# Patient Record
Sex: MALE | Race: WHITE | NOT HISPANIC OR LATINO | Employment: OTHER | URBAN - METROPOLITAN AREA
[De-identification: names, ages, dates, MRNs, and addresses within clinical notes are randomized per-mention and may not be internally consistent; named-entity substitution may affect disease eponyms.]

---

## 2021-05-25 ENCOUNTER — RECORDS - HEALTHEAST (OUTPATIENT)
Dept: ADMINISTRATIVE | Facility: CLINIC | Age: 71
End: 2021-05-25

## 2021-05-27 ENCOUNTER — RECORDS - HEALTHEAST (OUTPATIENT)
Dept: ADMINISTRATIVE | Facility: CLINIC | Age: 71
End: 2021-05-27

## 2021-05-30 ENCOUNTER — RECORDS - HEALTHEAST (OUTPATIENT)
Dept: ADMINISTRATIVE | Facility: CLINIC | Age: 71
End: 2021-05-30

## 2021-06-01 ENCOUNTER — RECORDS - HEALTHEAST (OUTPATIENT)
Dept: ADMINISTRATIVE | Facility: CLINIC | Age: 71
End: 2021-06-01

## 2021-06-02 ENCOUNTER — RECORDS - HEALTHEAST (OUTPATIENT)
Dept: ADMINISTRATIVE | Facility: CLINIC | Age: 71
End: 2021-06-02

## 2021-06-16 PROBLEM — K40.90 LEFT INGUINAL HERNIA: Status: ACTIVE | Noted: 2019-02-04

## 2021-06-16 PROBLEM — G47.33 OSA (OBSTRUCTIVE SLEEP APNEA): Status: ACTIVE | Noted: 2019-03-20

## 2021-06-16 PROBLEM — H25.13 NUCLEAR SCLEROTIC CATARACT OF BOTH EYES: Status: ACTIVE | Noted: 2018-06-13

## 2022-07-16 ENCOUNTER — APPOINTMENT (OUTPATIENT)
Dept: CT IMAGING | Facility: HOSPITAL | Age: 72
End: 2022-07-16
Attending: STUDENT IN AN ORGANIZED HEALTH CARE EDUCATION/TRAINING PROGRAM
Payer: COMMERCIAL

## 2022-07-16 ENCOUNTER — HOSPITAL ENCOUNTER (EMERGENCY)
Facility: HOSPITAL | Age: 72
Discharge: HOME | End: 2022-07-16
Attending: EMERGENCY MEDICINE | Admitting: EMERGENCY MEDICINE
Payer: COMMERCIAL

## 2022-07-16 ENCOUNTER — APPOINTMENT (OUTPATIENT)
Dept: MRI IMAGING | Facility: HOSPITAL | Age: 72
End: 2022-07-16
Attending: EMERGENCY MEDICINE
Payer: COMMERCIAL

## 2022-07-16 VITALS
OXYGEN SATURATION: 96 % | RESPIRATION RATE: 17 BRPM | HEART RATE: 61 BPM | HEIGHT: 67 IN | TEMPERATURE: 98.5 F | DIASTOLIC BLOOD PRESSURE: 87 MMHG | WEIGHT: 200 LBS | SYSTOLIC BLOOD PRESSURE: 164 MMHG | BODY MASS INDEX: 31.39 KG/M2

## 2022-07-16 DIAGNOSIS — R42 DIZZINESS: ICD-10-CM

## 2022-07-16 DIAGNOSIS — R42 DISEQUILIBRIUM: ICD-10-CM

## 2022-07-16 PROBLEM — H35.3132 NONEXUDATIVE AGE-RELATED MACULAR DEGENERATION, BILATERAL, INTERMEDIATE DRY STAGE: Status: ACTIVE | Noted: 2022-07-16

## 2022-07-16 PROBLEM — G89.29 CHRONIC NECK PAIN: Status: ACTIVE | Noted: 2021-06-30

## 2022-07-16 PROBLEM — G47.30 SLEEP APNEA: Status: ACTIVE | Noted: 2022-07-16

## 2022-07-16 PROBLEM — M54.16 LUMBAR RADICULAR PAIN: Status: ACTIVE | Noted: 2021-06-30

## 2022-07-16 PROBLEM — I89.0 LYMPHEDEMA: Status: ACTIVE | Noted: 2021-03-30

## 2022-07-16 PROBLEM — R69 OTHER ILL-DEFINED AND UNKNOWN CAUSES OF MORBIDITY AND MORTALITY: Status: ACTIVE | Noted: 2022-07-16

## 2022-07-16 PROBLEM — Z87.438 HISTORY OF ERECTILE DYSFUNCTION: Status: ACTIVE | Noted: 2022-07-16

## 2022-07-16 PROBLEM — H35.3190 NONEXUDATIVE AGE-RELATED MACULAR DEGENERATION: Status: ACTIVE | Noted: 2022-07-16

## 2022-07-16 PROBLEM — I34.0 MILD MITRAL VALVE REGURGITATION: Status: ACTIVE | Noted: 2021-03-18

## 2022-07-16 PROBLEM — F41.1 ANXIETY STATE: Status: ACTIVE | Noted: 2022-07-16

## 2022-07-16 PROBLEM — I07.1 MILD TRICUSPID VALVE REGURGITATION: Status: ACTIVE | Noted: 2021-03-18

## 2022-07-16 PROBLEM — H35.3113 NONEXUDATIVE AGE-RELATED MACULAR DEGENERATION, RIGHT EYE, ADVANCED ATROPHIC WITHOUT SUBFOVEAL INVOLVEMENT: Status: ACTIVE | Noted: 2022-07-16

## 2022-07-16 PROBLEM — I10 ESSENTIAL (PRIMARY) HYPERTENSION: Status: ACTIVE | Noted: 2022-07-16

## 2022-07-16 PROBLEM — M54.2 CHRONIC NECK PAIN: Status: ACTIVE | Noted: 2021-06-30

## 2022-07-16 PROBLEM — R93.3 ABNORMAL COLONOSCOPY: Status: ACTIVE | Noted: 2022-07-16

## 2022-07-16 PROBLEM — I77.810 MILD ASCENDING AORTA DILATION (H): Status: ACTIVE | Noted: 2021-03-18

## 2022-07-16 PROBLEM — I35.1 MILD AORTIC VALVE REGURGITATION: Status: ACTIVE | Noted: 2021-03-18

## 2022-07-16 PROBLEM — G47.00 INSOMNIA: Status: ACTIVE | Noted: 2022-07-16

## 2022-07-16 PROBLEM — H35.30 MACULAR DEGENERATION: Status: ACTIVE | Noted: 2022-07-16

## 2022-07-16 PROBLEM — F32.9 CURRENT EPISODE OF MAJOR DEPRESSIVE DISORDER WITHOUT PRIOR EPISODE, UNSPECIFIED DEPRESSION EPISODE SEVERITY: Status: ACTIVE | Noted: 2022-07-16

## 2022-07-16 LAB
ANION GAP SERPL CALCULATED.3IONS-SCNC: 7 MMOL/L (ref 5–18)
APTT PPP: 28 SECONDS (ref 22–38)
ATRIAL RATE - MUSE: 59 BPM
BUN SERPL-MCNC: 19 MG/DL (ref 8–28)
CALCIUM SERPL-MCNC: 8.8 MG/DL (ref 8.5–10.5)
CHLORIDE BLD-SCNC: 105 MMOL/L (ref 98–107)
CO2 SERPL-SCNC: 25 MMOL/L (ref 22–31)
CREAT SERPL-MCNC: 0.88 MG/DL (ref 0.7–1.3)
DIASTOLIC BLOOD PRESSURE - MUSE: 77 MMHG
ERYTHROCYTE [DISTWIDTH] IN BLOOD BY AUTOMATED COUNT: 13.1 % (ref 10–15)
GFR SERPL CREATININE-BSD FRML MDRD: >90 ML/MIN/1.73M2
GLUCOSE BLD-MCNC: 104 MG/DL (ref 70–125)
GLUCOSE BLDC GLUCOMTR-MCNC: 106 MG/DL (ref 70–99)
HCT VFR BLD AUTO: 40.5 % (ref 40–53)
HGB BLD-MCNC: 14.2 G/DL (ref 13.3–17.7)
HOLD SPECIMEN: NORMAL
INR PPP: 1.14 (ref 0.85–1.15)
INTERPRETATION ECG - MUSE: NORMAL
MAGNESIUM SERPL-MCNC: 2.1 MG/DL (ref 1.8–2.6)
MCH RBC QN AUTO: 32.1 PG (ref 26.5–33)
MCHC RBC AUTO-ENTMCNC: 35.1 G/DL (ref 31.5–36.5)
MCV RBC AUTO: 92 FL (ref 78–100)
P AXIS - MUSE: 53 DEGREES
PLATELET # BLD AUTO: 173 10E3/UL (ref 150–450)
POTASSIUM BLD-SCNC: 3.7 MMOL/L (ref 3.5–5)
PR INTERVAL - MUSE: 238 MS
QRS DURATION - MUSE: 90 MS
QT - MUSE: 408 MS
QTC - MUSE: 403 MS
R AXIS - MUSE: 14 DEGREES
RBC # BLD AUTO: 4.42 10E6/UL (ref 4.4–5.9)
SODIUM SERPL-SCNC: 137 MMOL/L (ref 136–145)
SYSTOLIC BLOOD PRESSURE - MUSE: 158 MMHG
T AXIS - MUSE: 18 DEGREES
TROPONIN I SERPL-MCNC: <0.01 NG/ML (ref 0–0.29)
VENTRICULAR RATE- MUSE: 59 BPM
WBC # BLD AUTO: 6.7 10E3/UL (ref 4–11)

## 2022-07-16 PROCEDURE — 70496 CT ANGIOGRAPHY HEAD: CPT

## 2022-07-16 PROCEDURE — 99285 EMERGENCY DEPT VISIT HI MDM: CPT | Mod: 25

## 2022-07-16 PROCEDURE — 70553 MRI BRAIN STEM W/O & W/DYE: CPT | Mod: MG

## 2022-07-16 PROCEDURE — 85027 COMPLETE CBC AUTOMATED: CPT | Performed by: STUDENT IN AN ORGANIZED HEALTH CARE EDUCATION/TRAINING PROGRAM

## 2022-07-16 PROCEDURE — 85610 PROTHROMBIN TIME: CPT | Performed by: STUDENT IN AN ORGANIZED HEALTH CARE EDUCATION/TRAINING PROGRAM

## 2022-07-16 PROCEDURE — 255N000002 HC RX 255 OP 636: Performed by: EMERGENCY MEDICINE

## 2022-07-16 PROCEDURE — 85730 THROMBOPLASTIN TIME PARTIAL: CPT | Mod: GZ | Performed by: STUDENT IN AN ORGANIZED HEALTH CARE EDUCATION/TRAINING PROGRAM

## 2022-07-16 PROCEDURE — 83735 ASSAY OF MAGNESIUM: CPT | Performed by: EMERGENCY MEDICINE

## 2022-07-16 PROCEDURE — A9585 GADOBUTROL INJECTION: HCPCS | Performed by: EMERGENCY MEDICINE

## 2022-07-16 PROCEDURE — 36415 COLL VENOUS BLD VENIPUNCTURE: CPT | Performed by: STUDENT IN AN ORGANIZED HEALTH CARE EDUCATION/TRAINING PROGRAM

## 2022-07-16 PROCEDURE — 84484 ASSAY OF TROPONIN QUANT: CPT | Performed by: STUDENT IN AN ORGANIZED HEALTH CARE EDUCATION/TRAINING PROGRAM

## 2022-07-16 PROCEDURE — 93005 ELECTROCARDIOGRAM TRACING: CPT | Performed by: STUDENT IN AN ORGANIZED HEALTH CARE EDUCATION/TRAINING PROGRAM

## 2022-07-16 PROCEDURE — 250N000011 HC RX IP 250 OP 636: Performed by: EMERGENCY MEDICINE

## 2022-07-16 PROCEDURE — 82310 ASSAY OF CALCIUM: CPT | Performed by: STUDENT IN AN ORGANIZED HEALTH CARE EDUCATION/TRAINING PROGRAM

## 2022-07-16 RX ORDER — MECLIZINE HYDROCHLORIDE 25 MG/1
25 TABLET ORAL 3 TIMES DAILY PRN
Qty: 15 TABLET | Refills: 0 | Status: ON HOLD | OUTPATIENT
Start: 2022-07-16 | End: 2023-01-07

## 2022-07-16 RX ORDER — MECLIZINE HCL 12.5 MG 12.5 MG/1
50 TABLET ORAL 4 TIMES DAILY PRN
Qty: 20 TABLET | Refills: 0 | Status: SHIPPED | OUTPATIENT
Start: 2022-07-16 | End: 2022-07-16 | Stop reason: DRUGHIGH

## 2022-07-16 RX ORDER — IOPAMIDOL 755 MG/ML
75 INJECTION, SOLUTION INTRAVASCULAR ONCE
Status: COMPLETED | OUTPATIENT
Start: 2022-07-16 | End: 2022-07-16

## 2022-07-16 RX ORDER — GADOBUTROL 604.72 MG/ML
9 INJECTION INTRAVENOUS ONCE
Status: COMPLETED | OUTPATIENT
Start: 2022-07-16 | End: 2022-07-16

## 2022-07-16 RX ADMIN — GADOBUTROL 9 ML: 604.72 INJECTION INTRAVENOUS at 08:59

## 2022-07-16 RX ADMIN — IOPAMIDOL 75 ML: 755 INJECTION, SOLUTION INTRAVENOUS at 07:29

## 2022-07-16 ASSESSMENT — ENCOUNTER SYMPTOMS
RHINORRHEA: 1
LIGHT-HEADEDNESS: 0
SORE THROAT: 0
BLOOD IN STOOL: 0
DIZZINESS: 1
ABDOMINAL PAIN: 1
WEAKNESS: 0
DIARRHEA: 1
HEADACHES: 0
PALPITATIONS: 0
VOMITING: 0
EYES NEGATIVE: 1
SHORTNESS OF BREATH: 0

## 2022-07-16 NOTE — ED TRIAGE NOTES
Pt woke up this morning with persistent dizziness and gait problems at 0530. When walking, pt feels like he is going to fall to the left. Dizziness does not improve with sitting or laying down. Last known well was when he went to bed at 2200 last night. Provider neuro assessment was called to triage, and Dr. Champion called Tier 2 Stroke Code. Pt states he has been having this problem intermittently for the last few months.      Triage Assessment     Row Name 07/16/22 0705       Triage Assessment (Adult)    Airway WDL WDL       Respiratory WDL    Respiratory WDL WDL       Skin Circulation/Temperature WDL    Skin Circulation/Temperature WDL WDL       Cardiac WDL    Cardiac WDL WDL       Peripheral/Neurovascular WDL    Peripheral Neurovascular WDL WDL       Cognitive/Neuro/Behavioral WDL    Level of Consciousness alert    Arousal Level opens eyes spontaneously    Orientation oriented x 4    Speech logical       Simon Coma Scale    Best Eye Response 4-->(E4) spontaneous    Best Motor Response 6-->(M6) obeys commands    Best Verbal Response 5-->(V5) oriented    Simon Coma Scale Score 15

## 2022-07-16 NOTE — ED PROVIDER NOTES
EMERGENCY DEPARTMENT ENCOUNTER      NAME: Jordan Fisher  AGE: 71 year old male  YOB: 1950  MRN: [id number]  EVALUATION DATE & TIME: No admission date for patient encounter.    PCP: No primary care provider on file.    ED PROVIDER: Mary Champion M.D.      CHIEF COMPLAINT     Chief Complaint   Patient presents with     Tier 2 Stroke Code     Gait Disturbance         FINAL IMPRESSION:     1. Disequilibrium    2. Dizziness          MEDICAL DECISION MAKING:       Pertinent Labs & Imaging studies reviewed. (See chart for details)    71 year old male presents to the Emergency Department for evaluation of feels out of balance    ED Course as of 07/16/22 1031   Sat Jul 16, 2022   0842 Mr. Fisher is a 72 yo male who drove his self to the emergency department.   Patient went to sleep at 10 pm and woke up at 5:30 am feeling like he was going to fall.  It worse when he turns his head from side to side.  Similar symptoms intermittent for the last 2 months. Has not seen doctor for that.  He denies feeling syncope or near syncope.     0845 Patient denies headache no visual problems no chest pain or shortness of breath.  He has a history of hiatal hernia noticed about a few months he denies any more hiccups no vomiting did have 2 loose stools with no blood no dysuria no leg swelling no weakness.   0845 On examination he is well-appearing in no respiratory distress no focal deficits gait is steady.   0845 Differential diagnoses include but not limited to peripheral vertigo Ménière's disease TIA CVA electrolyte abnormalities arrhythmia among others.   0906 Normal renal function magnesium hemoglobin troponin   1029 Poke with neurology current recommendation is PT.  Discussed with patient online encouraged him to call his primary care doctor Monday to discuss his symptoms.  Discussed with him driving this is dangerous given the he has symptoms when he turns his head.  He feels comfortable with the above plan discharge  ambulatory in stable condition.   1029 Medical impression and decision making 71-year-old male history of hypertension presents here feeling like he is going to fall.  Usually happens when he turns his head.  Intermittently for the last 2 months but worse today.  No associated chest pain shortness of breath or feeling of syncope or near syncope.  Well-appearing on examination no focal deficits gait is steady.  Stroke tier 2 called.  CTA head and neck no acute.  MRI no evidence of CVA.  Discussed with neurology recommend PT evaluation encourage patient to follow-up primary care doctor and return for worsening symptoms discharged ambulatory in stable condition.       Vital Signs: Hypertension  EKG: Sinus rhythm with first-degree AV block  Imaging: CTA head and neck no acute MRI no acute chest x-ray negative  Home Meds: Reviewed  ED meds/abx:none  Fluids: tko    Labs  K 3.7   Cr 0.88  Wbc 6.7  Hgb 14.2  Platelets 173  Magnesium normal troponin normal      Review of Previous Records  1/6/2022 - Cardiology Visit, Dr. Powell  Summary  1. Echo 1/6/2022 2:06:00 PM.  2. Normal sinus rhythm during study.  3. The left ventricular chamber dimension is normal and systolic function is normal with an estimated ejection fraction of 60-65%.  4. Biplane Method of Discs derived LV EF 63%.  5. Left ventricular segmental wall motion is normal.  6. Borderline concentric LVH.  7. The right ventricular chamber dimension is normal and systolic function is normal.  8. Left atrial chamber dimension is mildly enlarged.  9. There is mild aortic valve regurgitation.  10. There is trace mitral valve regurgitation.  11. There is trace tricuspid valve regurgitation.  12. Trace to mild pulmonic regurgitation.  13. The aortic root at the sinus of Valsalva is normal measuring 3.69 cm with an index of 1.78 cm/m2.  14. The proximal ascending aorta is mildly dilated measuring 3.84 cm with an index of 1.85 cm/m2.  15. Transverse thoracic aorta is upper  normal range measuring 3.5 cm.  16. Compared to report of study dated 3/18/2021. no significant interval changes noted.    Consults  Stroke Neurology - Dr. Fiore  Neuro Radiology - Dr. Knapp  Neurology - Dr. Harmon      ED COURSE   7:03 AM I met with the patient, obtained history, performed an initial exam, and discussed options and plan for diagnostic and treatment here in the ED.    7:14 AM I spoke with Dr. Fiore, stroke neurologist.    7:29 AM I spoke with neuro radiology.    7:38 AM I spoke with Dr. Fiore, stroke neurology. Deescalated stroke code.    7:52 AM I spoke with Dr. Knapp, neuro radiology.    7:54 AM Checked in on and updated patient.     10:03 AM I spoke to radiology. They don't have the MRI images for patient.    10:04 AM I updated the patient.     10:26 AM I spoke with Dr. Harmon, neurology.    10:27 AM We discussed the plan for discharge and the patient is agreeable. Reviewed supportive cares, symptomatic treatment, outpatient follow up, and reasons to return to the Emergency Department. All questions and concerns were addressed. Patient to be discharged by ED RN.     At the conclusion of the encounter I discussed the results of all of the tests and the disposition. The questions were answered. The patient acknowledged understanding and was agreeable with the care plan.     MEDICATIONS GIVEN IN THE EMERGENCY:     Medications   iopamidol (ISOVUE-370) solution 75 mL (75 mLs Intravenous Given 7/16/22 0729)   gadobutrol (GADAVIST) injection 9 mL (9 mLs Intravenous Given 7/16/22 0859)       NEW PRESCRIPTIONS STARTED AT TODAY'S ER VISIT     New Prescriptions    MECLIZINE (ANTIVERT) 25 MG TABLET    Take 1 tablet (25 mg) by mouth 3 times daily as needed for dizziness          =================================================================    HPI     Patient information was obtained from: patient     Use of : N/A       Jordan Fisher is a 71 year old male who presented to the ED at  "7:00 AM by private care for evaluation of dizziness and gait disturbance.    The patient has a pertinent history of depression, anxiety, HTN, hyperlipidemia, GERD, hiatal hernia, mild aortic valve regurgitation, mild ascending aorta dilation, mild mitral valve regurgitation, and mild tricuspid valve regurgitation.    Patient woke up at 5:30 AM today with dizziness, feeling like he is losing his balance or \"about to fall over\". Last known well was when he went to bed last night at 10:00 PM. He experiences this even when seated if he turns his head to either side. Movement, particularly turning around, provokes his sensation of imbalance. There are no palliating factors. The patient reports chronic rhinorrhea and 2 episodes of diarrhea since yesterday. The patient says that he has experienced similar episodes of dizziness and imbalance daily over the last 2 months, but never as severe or persistent as it is today. These only last a few minutes. Yesterday, patient had two of these episodes. Has not seen a primary care provider for these episodes.     He has had chronic abdominal pain for ~10 years which he attributes to his hernia and has had hiccups for the last ~1 month. Of note, patient has not taken his daily medications yet today. The patient denies any headache, sore throat, visual problems, rash, vomiting, bloody stools, chest pain, chest pressure, shortness of breath, palpitations, weakness in his extremities, lightheadedness, or syncope. Patient denies additional medical concerns or complaints at this time.      REVIEW OF SYSTEMS   Review of Systems   HENT: Positive for rhinorrhea (chronic). Negative for sore throat.    Eyes: Negative.  Negative for visual disturbance.   Respiratory: Negative for shortness of breath.    Cardiovascular: Negative for chest pain and palpitations.        Negative for chest pressure.    Gastrointestinal: Positive for abdominal pain (due to hernia) and diarrhea (2 episodes since " "yesterday). Negative for blood in stool and vomiting.   Skin: Negative for rash.   Neurological: Positive for dizziness. Negative for syncope, weakness, light-headedness and headaches.        Positive for impaired balance.   All other systems reviewed and are negative.    PAST MEDICAL HISTORY:   History reviewed. No pertinent past medical history.    PAST SURGICAL HISTORY:     Past Surgical History:   Procedure Laterality Date     CHOLECYSTECTOMY           CURRENT MEDICATIONS:   meclizine (ANTIVERT) 25 MG tablet  amLODIPine (NORVASC) 5 MG tablet  aspirin 81 mg chewable tablet  hydroCHLOROthiazide (HYDRODIURIL) 12.5 MG tablet  multivitamin with minerals tablet  sildenafil (VIAGRA) 50 MG tablet         ALLERGIES:     Allergies   Allergen Reactions     Amoxicillin Unknown     Other reaction(s): Other (see comments)  Unsure, long time ago       Niacin Unknown     Shellfish Containing Products [Shellfish-Derived Products] Unknown     Lisinopril Cough       FAMILY HISTORY:   History reviewed. No pertinent family history.    SOCIAL HISTORY:     Social History     Socioeconomic History     Marital status:    Tobacco Use     Smoking status: Never Smoker     Smokeless tobacco: Never Used   Substance and Sexual Activity     Alcohol use: Yes     Drug use: No       VITALS:   BP (!) 155/87   Pulse 59   Temp 98.5  F (36.9  C) (Temporal)   Resp 18   Ht 1.702 m (5' 7\")   Wt 90.7 kg (200 lb)   SpO2 96%   BMI 31.32 kg/m      PHYSICAL EXAM     Physical Exam  Vitals and nursing note reviewed. Exam conducted with a chaperone present.   Constitutional:       Appearance: Normal appearance.   Musculoskeletal:      Cervical back: Normal range of motion and neck supple.   Neurological:      Mental Status: He is alert.         Physical Exam   Constitutional: Cooperative and pleasant on exam.     Head: Atraumatic.     Nose: Nose normal.     Mouth/Throat: Oropharynx is clear and moist.     Eyes: EOM are normal. Pupils are equal, " round, and reactive to light.     Ears: Bilateral pearly white TMs.     Neck: Normal range of motion. Neck supple.     Cardiovascular: Normal rate, regular rhythm and normal heart sounds.      Pulmonary/Chest: Normal effort  and breath sounds normal.     Abdominal: Soft, nontender    Musculoskeletal: Normal range of motion.     Neurological: No focal deficits.     Lymphatics: No edema.     : N/A    Skin: Skin is warm and dry.     Psychiatric: Normal mood and affect. Behavior is normal.     National Institutes of Health Stroke Scale  Exam Interval: Baseline   Score    Level of consciousness: (0)   Alert, keenly responsive    LOC questions: (0)   Answers both questions correctly    LOC commands: (0)   Performs both tasks correctly    Best gaze: (0)   Normal    Visual: (0)   No visual loss    Facial palsy: (0)   Normal symmetrical movements    Motor arm (left): (0)   No drift    Motor arm (right): (0)   No drift    Motor leg (left): (0)   No drift    Motor leg (right): (0)   No drift    Limb ataxia: (0)   Absent    Sensory: (0)   Normal- no sensory loss    Best language: (0)   Normal- no aphasia    Dysarthria: (0)   Normal    Extinction and inattention: (0)   No abnormality        Total Score:  0       LAB:     All pertinent labs reviewed and interpreted.  Labs Ordered and Resulted from Time of ED Arrival to Time of ED Departure   GLUCOSE BY METER - Abnormal       Result Value    GLUCOSE BY METER POCT 106 (*)    CBC WITH PLATELETS - Normal    WBC Count 6.7      RBC Count 4.42      Hemoglobin 14.2      Hematocrit 40.5      MCV 92      MCH 32.1      MCHC 35.1      RDW 13.1      Platelet Count 173     PARTIAL THROMBOPLASTIN TIME - Normal    aPTT 28     INR - Normal    INR 1.14     BASIC METABOLIC PANEL - Normal    Sodium 137      Potassium 3.7      Chloride 105      Carbon Dioxide (CO2) 25      Anion Gap 7      Urea Nitrogen 19      Creatinine 0.88      Calcium 8.8      Glucose 104      GFR Estimate >90     TROPONIN I -  Normal    Troponin I <0.01     MAGNESIUM - Normal    Magnesium 2.1     GLUCOSE MONITOR NURSING POCT        RADIOLOGY:     Reviewed all pertinent imaging. Please see official radiology report.  MR Brain w/o & w Contrast   Final Result   IMPRESSION:   1.  No evidence of acute infarction or other acute intracranial abnormality.   2.  Mild-to-moderate presumed chronic small vessel ischemic change.      CTA Head Neck with Contrast   Final Result   IMPRESSION:    HEAD CT:   1.  No acute intracranial hemorrhage, mass effect, or CT evidence for acute infarction.   2.  Presumed sequelae of mild chronic small vessel ischemic disease.      HEAD CTA:    1.  No high-grade stenosis, vascular occlusion, aneurysm, or high flow vascular malformation identified.   2.  Mild to moderate focal stenosis of the mid V4 segment of the right vertebral artery, though with reconstitution of normal distal caliber to the vertebrobasilar junction.   3.  Variant [place] anatomy as above.      NECK CTA:   1.  Fusiform dilatation of the distal right cervical ICA with a broad-based ventrolaterally projecting aneurysm measuring 4 x 11 mm.   2.  Approximately 50% atherosclerotic stenosis in the proximal right ICA.   3.  Less than 50% atherosclerotic stenosis in the proximal left ICA.         Noncontrast head CT discussed with Mary Champion on 7/16/2022 7:28 AM.   CTA results discussed with Mary Champion on 7/16/2022 7:50 AM.           EKG:     EKG #1  Sinus bradycardia first-degree AV block normal anterior progression normal axis inverted T wave in lead III    Time:[id number]    Ventricular rate 59 bmp  Axis normal  KY interval 238 ms  QRS duration 90 ms  QT//403 ms    Compared to previous EKG on June 2019 no significant changes.  I have independently reviewed and interpreted the EKG(s) documented above.      PROCEDURES:     Procedures      I, Deidre Smith am serving as a scribe to document services personally performed by Dr. Champion  based on my observation and the provider's statements to me. I, Mary Champion MD attest that Deidre Sarah is acting in a scribe capacity, has observed my performance of the services and has documented them in accordance with my direction.    Mary Champion M.D.  Emergency Medicine  [facility] EMERGENCY DEPARTMENT  [address] [place] [id number]  [phone]  Dept: [phone]      Mary Champion MD  07/16/22 7523

## 2022-07-16 NOTE — DISCHARGE INSTRUCTIONS
Read and follow the discharge instructions.    May try meclizine as medication for seasickness type of dizziness.    Continue your current medications.    Call your primary care doctor on Monday to let them know the new symptoms are you having.  Times that we will refer you to physical therapy and this can be helpful.    Will also benefit from seeing a neurologist but these are harder to get into so please call your primary care doctor first    Need to be careful while driving given that you have these episodes of feeling out of balance when you turn your head.    Return immediately or call 911 if you having chest pain shortness of breath feeling fainting or any other concerns.

## 2022-07-16 NOTE — CONSULTS
"    [facility]    Stroke Telephone Note    I was called by Mary Champion on 07/16/22 regarding patient Jordan Fisher. The patient is a 71 year old male who went to bed feeling fine 10:30 pm last night and woke up this morning with dizziness that gets worse when he moves his head. He had similar episodes before in his life but this is more severe. Neurological examination is negative.    BP (!) 200/94   Pulse 64   Temp 98.5  F (36.9  C) (Temporal)   Resp 18   Ht 1.702 m (5' 7\")   Wt 90.7 kg (200 lb)   SpO2 97%   BMI 31.32 kg/m         Stroke Code Data (for stroke code without tele)  Stroke code activated 07/16/22   0707   Stroke provider first response  07/16/22   0711            Last known normal 07/15/22   2230        Time of discovery   (or onset of symptoms) 07/16/22       Head CT read by Stroke Neuro Dr/Provider 07/16/22   0720   Was stroke code de-escalated? Yes 07/16/22 0739          Imaging Findings   CT: no acute pathology   CTA: no LVO    Intravenous Thrombolysis  Not given due to:   - unclear or unfavorable risk-benefit profile for extended window thrombolysis beyond the conventional 4.5 hour time window    Endovascular Treatment  Not initiated due to absence of proximal vessel occlusion    Impression  Vertigo. Likely peripheral becaus of lack of any other symptom or sign of brainstem dysfunction and because of similar episodes in the past.       Recommendations   MRI brain with and without contrast    My recommendations are based on the information provided over the phone by Jordan Fisher's in-person providers. They are not intended to replace the clinical judgment of his in-person providers. I was not requested to personally see or examine the patient at this time.        Katiuska Fiore MD, Msc, FAKEY, FAAN   of Neurology  [facility]     07/16/2022 7:39 AM  To page me or covering stroke neurology team member, click here: " "[facility]  Choose \"On Call\" tab at top, then search dropdown box for \"Neurology Adult\" & press Enter, look for Neuro ICU/Stroke      "

## 2023-01-06 ENCOUNTER — HOSPITAL ENCOUNTER (EMERGENCY)
Facility: CLINIC | Age: 73
Discharge: ANOTHER HOSPITAL | End: 2023-01-06
Attending: EMERGENCY MEDICINE | Admitting: EMERGENCY MEDICINE
Payer: COMMERCIAL

## 2023-01-06 ENCOUNTER — HOSPITAL ENCOUNTER (INPATIENT)
Facility: CLINIC | Age: 73
LOS: 6 days | Discharge: HOME | DRG: 246 | End: 2023-01-12
Admitting: INTERNAL MEDICINE
Payer: COMMERCIAL

## 2023-01-06 VITALS
TEMPERATURE: 97.8 F | HEART RATE: 56 BPM | WEIGHT: 192 LBS | OXYGEN SATURATION: 93 % | DIASTOLIC BLOOD PRESSURE: 82 MMHG | SYSTOLIC BLOOD PRESSURE: 132 MMHG | RESPIRATION RATE: 20 BRPM | BODY MASS INDEX: 30.13 KG/M2 | HEIGHT: 67 IN

## 2023-01-06 DIAGNOSIS — I21.9 ACUTE MYOCARDIAL INFARCTION, INITIAL EPISODE OF CARE (H): ICD-10-CM

## 2023-01-06 DIAGNOSIS — I21.3 ST ELEVATION MYOCARDIAL INFARCTION (STEMI), UNSPECIFIED ARTERY (H): ICD-10-CM

## 2023-01-06 DIAGNOSIS — G47.30 SLEEP APNEA, UNSPECIFIED TYPE: ICD-10-CM

## 2023-01-06 DIAGNOSIS — Z98.890 S/P CORONARY ANGIOGRAM: ICD-10-CM

## 2023-01-06 DIAGNOSIS — G47.33 OSA (OBSTRUCTIVE SLEEP APNEA): ICD-10-CM

## 2023-01-06 DIAGNOSIS — I21.3 ST ELEVATION MI (STEMI) (H): ICD-10-CM

## 2023-01-06 DIAGNOSIS — I21.02 ST ELEVATION MYOCARDIAL INFARCTION INVOLVING LEFT ANTERIOR DESCENDING (LAD) CORONARY ARTERY (H): Primary | ICD-10-CM

## 2023-01-06 DIAGNOSIS — I21.02 ACUTE ST ELEVATION MYOCARDIAL INFARCTION (STEMI) INVOLVING LEFT ANTERIOR DESCENDING (LAD) CORONARY ARTERY (H): ICD-10-CM

## 2023-01-06 LAB
ACT BLD: 292 SECONDS (ref 74–150)
ACT BLD: 314 SECONDS (ref 74–150)
ALBUMIN SERPL BCG-MCNC: 4 G/DL (ref 3.5–5.2)
ALP SERPL-CCNC: 89 U/L (ref 40–129)
ALT SERPL W P-5'-P-CCNC: 19 U/L (ref 10–50)
ANION GAP SERPL CALCULATED.3IONS-SCNC: 12 MMOL/L (ref 7–15)
APTT PPP: 28 SECONDS (ref 22–38)
AST SERPL W P-5'-P-CCNC: 20 U/L (ref 10–50)
BASOPHILS # BLD AUTO: 0 10E3/UL (ref 0–0.2)
BASOPHILS NFR BLD AUTO: 0 %
BILIRUB SERPL-MCNC: 0.9 MG/DL
BUN SERPL-MCNC: 18.8 MG/DL (ref 8–23)
CALCIUM SERPL-MCNC: 8.8 MG/DL (ref 8.8–10.2)
CHLORIDE SERPL-SCNC: 103 MMOL/L (ref 98–107)
CREAT SERPL-MCNC: 0.86 MG/DL (ref 0.67–1.17)
DEPRECATED HCO3 PLAS-SCNC: 22 MMOL/L (ref 22–29)
EOSINOPHIL # BLD AUTO: 0.2 10E3/UL (ref 0–0.7)
EOSINOPHIL NFR BLD AUTO: 3 %
ERYTHROCYTE [DISTWIDTH] IN BLOOD BY AUTOMATED COUNT: 12.6 % (ref 10–15)
GFR SERPL CREATININE-BSD FRML MDRD: >90 ML/MIN/1.73M2
GLUCOSE BLDC GLUCOMTR-MCNC: 190 MG/DL (ref 70–99)
GLUCOSE SERPL-MCNC: 125 MG/DL (ref 70–99)
HCT VFR BLD AUTO: 39.7 % (ref 40–53)
HGB BLD-MCNC: 13.4 G/DL (ref 13.3–17.7)
HOLD SPECIMEN: NORMAL
IMM GRANULOCYTES # BLD: 0 10E3/UL
IMM GRANULOCYTES NFR BLD: 0 %
INR PPP: 1.15 (ref 0.85–1.15)
LYMPHOCYTES # BLD AUTO: 3.3 10E3/UL (ref 0.8–5.3)
LYMPHOCYTES NFR BLD AUTO: 39 %
MCH RBC QN AUTO: 30.5 PG (ref 26.5–33)
MCHC RBC AUTO-ENTMCNC: 33.8 G/DL (ref 31.5–36.5)
MCV RBC AUTO: 90 FL (ref 78–100)
MONOCYTES # BLD AUTO: 0.8 10E3/UL (ref 0–1.3)
MONOCYTES NFR BLD AUTO: 9 %
NEUTROPHILS # BLD AUTO: 4.2 10E3/UL (ref 1.6–8.3)
NEUTROPHILS NFR BLD AUTO: 49 %
NRBC # BLD AUTO: 0 10E3/UL
NRBC BLD AUTO-RTO: 0 /100
PLATELET # BLD AUTO: 155 10E3/UL (ref 150–450)
POTASSIUM SERPL-SCNC: 3.5 MMOL/L (ref 3.4–5.3)
PROT SERPL-MCNC: 7.5 G/DL (ref 6.4–8.3)
RBC # BLD AUTO: 4.39 10E6/UL (ref 4.4–5.9)
SODIUM SERPL-SCNC: 137 MMOL/L (ref 136–145)
TROPONIN T SERPL HS-MCNC: 13 NG/L
WBC # BLD AUTO: 8.6 10E3/UL (ref 4–11)

## 2023-01-06 PROCEDURE — 99291 CRITICAL CARE FIRST HOUR: CPT | Mod: 25

## 2023-01-06 PROCEDURE — 80053 COMPREHEN METABOLIC PANEL: CPT | Performed by: EMERGENCY MEDICINE

## 2023-01-06 PROCEDURE — C1874 STENT, COATED/COV W/DEL SYS: HCPCS | Performed by: INTERNAL MEDICINE

## 2023-01-06 PROCEDURE — 93454 CORONARY ARTERY ANGIO S&I: CPT | Performed by: INTERNAL MEDICINE

## 2023-01-06 PROCEDURE — 027034Z DILATION OF CORONARY ARTERY, ONE ARTERY WITH DRUG-ELUTING INTRALUMINAL DEVICE, PERCUTANEOUS APPROACH: ICD-10-PCS | Performed by: INTERNAL MEDICINE

## 2023-01-06 PROCEDURE — 200N000002 HC R&B ICU UMMC

## 2023-01-06 PROCEDURE — 96374 THER/PROPH/DIAG INJ IV PUSH: CPT

## 2023-01-06 PROCEDURE — 85730 THROMBOPLASTIN TIME PARTIAL: CPT | Performed by: EMERGENCY MEDICINE

## 2023-01-06 PROCEDURE — C1753 CATH, INTRAVAS ULTRASOUND: HCPCS | Performed by: INTERNAL MEDICINE

## 2023-01-06 PROCEDURE — 250N000011 HC RX IP 250 OP 636: Performed by: STUDENT IN AN ORGANIZED HEALTH CARE EDUCATION/TRAINING PROGRAM

## 2023-01-06 PROCEDURE — 272N000001 HC OR GENERAL SUPPLY STERILE: Performed by: INTERNAL MEDICINE

## 2023-01-06 PROCEDURE — 250N000009 HC RX 250: Performed by: INTERNAL MEDICINE

## 2023-01-06 PROCEDURE — 85610 PROTHROMBIN TIME: CPT | Performed by: EMERGENCY MEDICINE

## 2023-01-06 PROCEDURE — 84484 ASSAY OF TROPONIN QUANT: CPT | Performed by: EMERGENCY MEDICINE

## 2023-01-06 PROCEDURE — 250N000011 HC RX IP 250 OP 636: Performed by: INTERNAL MEDICINE

## 2023-01-06 PROCEDURE — 85347 COAGULATION TIME ACTIVATED: CPT

## 2023-01-06 PROCEDURE — C1887 CATHETER, GUIDING: HCPCS | Performed by: INTERNAL MEDICINE

## 2023-01-06 PROCEDURE — 250N000011 HC RX IP 250 OP 636: Performed by: EMERGENCY MEDICINE

## 2023-01-06 PROCEDURE — C1769 GUIDE WIRE: HCPCS | Performed by: INTERNAL MEDICINE

## 2023-01-06 PROCEDURE — 92978 ENDOLUMINL IVUS OCT C 1ST: CPT | Mod: 26 | Performed by: INTERNAL MEDICINE

## 2023-01-06 PROCEDURE — 92978 ENDOLUMINL IVUS OCT C 1ST: CPT | Performed by: INTERNAL MEDICINE

## 2023-01-06 PROCEDURE — 96375 TX/PRO/DX INJ NEW DRUG ADDON: CPT

## 2023-01-06 PROCEDURE — C1760 CLOSURE DEV, VASC: HCPCS | Performed by: INTERNAL MEDICINE

## 2023-01-06 PROCEDURE — C9606 PERC D-E COR REVASC W AMI S: HCPCS | Performed by: INTERNAL MEDICINE

## 2023-01-06 PROCEDURE — 93454 CORONARY ARTERY ANGIO S&I: CPT | Mod: 26 | Performed by: INTERNAL MEDICINE

## 2023-01-06 PROCEDURE — 999N000104 HC STATISTIC NO CHARGE

## 2023-01-06 PROCEDURE — 99153 MOD SED SAME PHYS/QHP EA: CPT | Performed by: INTERNAL MEDICINE

## 2023-01-06 PROCEDURE — 85025 COMPLETE CBC W/AUTO DIFF WBC: CPT | Performed by: EMERGENCY MEDICINE

## 2023-01-06 PROCEDURE — 99152 MOD SED SAME PHYS/QHP 5/>YRS: CPT | Performed by: INTERNAL MEDICINE

## 2023-01-06 PROCEDURE — 36415 COLL VENOUS BLD VENIPUNCTURE: CPT | Performed by: EMERGENCY MEDICINE

## 2023-01-06 PROCEDURE — 250N000013 HC RX MED GY IP 250 OP 250 PS 637: Performed by: EMERGENCY MEDICINE

## 2023-01-06 PROCEDURE — B2111ZZ FLUOROSCOPY OF MULTIPLE CORONARY ARTERIES USING LOW OSMOLAR CONTRAST: ICD-10-PCS | Performed by: INTERNAL MEDICINE

## 2023-01-06 PROCEDURE — 99152 MOD SED SAME PHYS/QHP 5/>YRS: CPT | Mod: GC | Performed by: INTERNAL MEDICINE

## 2023-01-06 PROCEDURE — 92941 PRQ TRLML REVSC TOT OCCL AMI: CPT | Mod: LD | Performed by: INTERNAL MEDICINE

## 2023-01-06 PROCEDURE — 99291 CRITICAL CARE FIRST HOUR: CPT | Performed by: EMERGENCY MEDICINE

## 2023-01-06 PROCEDURE — C1894 INTRO/SHEATH, NON-LASER: HCPCS | Performed by: INTERNAL MEDICINE

## 2023-01-06 PROCEDURE — C1725 CATH, TRANSLUMIN NON-LASER: HCPCS | Performed by: INTERNAL MEDICINE

## 2023-01-06 DEVICE — CLOSURE ANGIOSEAL 6FR 610130: Type: IMPLANTABLE DEVICE | Status: FUNCTIONAL

## 2023-01-06 DEVICE — STENT CORONARY DES SYNERGY XD MR US 3.00X38MM H7493941838300: Type: IMPLANTABLE DEVICE | Status: FUNCTIONAL

## 2023-01-06 RX ORDER — PROCHLORPERAZINE 25 MG
12.5 SUPPOSITORY, RECTAL RECTAL EVERY 12 HOURS PRN
Status: DISCONTINUED | OUTPATIENT
Start: 2023-01-06 | End: 2023-01-12 | Stop reason: HOSPADM

## 2023-01-06 RX ORDER — ASPIRIN 81 MG/1
324 TABLET, CHEWABLE ORAL ONCE
Status: COMPLETED | OUTPATIENT
Start: 2023-01-06 | End: 2023-01-06

## 2023-01-06 RX ORDER — ENOXAPARIN SODIUM 100 MG/ML
40 INJECTION SUBCUTANEOUS EVERY 24 HOURS
Status: DISCONTINUED | OUTPATIENT
Start: 2023-01-07 | End: 2023-01-12 | Stop reason: HOSPADM

## 2023-01-06 RX ORDER — POLYETHYLENE GLYCOL 3350 17 G/17G
17 POWDER, FOR SOLUTION ORAL DAILY PRN
Status: DISCONTINUED | OUTPATIENT
Start: 2023-01-06 | End: 2023-01-12 | Stop reason: HOSPADM

## 2023-01-06 RX ORDER — ADENOSINE 3 MG/ML
INJECTION, SOLUTION INTRAVENOUS
Status: DISCONTINUED | OUTPATIENT
Start: 2023-01-06 | End: 2023-01-10 | Stop reason: HOSPADM

## 2023-01-06 RX ORDER — HEPARIN SODIUM 1000 [USP'U]/ML
INJECTION, SOLUTION INTRAVENOUS; SUBCUTANEOUS
Status: DISCONTINUED | OUTPATIENT
Start: 2023-01-06 | End: 2023-01-08 | Stop reason: CLARIF

## 2023-01-06 RX ORDER — PROCHLORPERAZINE MALEATE 5 MG
5 TABLET ORAL EVERY 6 HOURS PRN
Status: DISCONTINUED | OUTPATIENT
Start: 2023-01-06 | End: 2023-01-12 | Stop reason: HOSPADM

## 2023-01-06 RX ORDER — NITROGLYCERIN 20 MG/100ML
10-200 INJECTION INTRAVENOUS CONTINUOUS
Status: DISCONTINUED | OUTPATIENT
Start: 2023-01-06 | End: 2023-01-06 | Stop reason: HOSPADM

## 2023-01-06 RX ORDER — NICOTINE POLACRILEX 4 MG
15-30 LOZENGE BUCCAL
Status: DISCONTINUED | OUTPATIENT
Start: 2023-01-06 | End: 2023-01-12 | Stop reason: HOSPADM

## 2023-01-06 RX ORDER — LIDOCAINE 40 MG/G
CREAM TOPICAL
Status: DISCONTINUED | OUTPATIENT
Start: 2023-01-06 | End: 2023-01-12 | Stop reason: HOSPADM

## 2023-01-06 RX ORDER — MORPHINE SULFATE 4 MG/ML
4 INJECTION, SOLUTION INTRAMUSCULAR; INTRAVENOUS
Status: COMPLETED | OUTPATIENT
Start: 2023-01-06 | End: 2023-01-06

## 2023-01-06 RX ORDER — ONDANSETRON 2 MG/ML
4 INJECTION INTRAMUSCULAR; INTRAVENOUS ONCE
Status: COMPLETED | OUTPATIENT
Start: 2023-01-06 | End: 2023-01-06

## 2023-01-06 RX ORDER — AMOXICILLIN 250 MG
2 CAPSULE ORAL 2 TIMES DAILY PRN
Status: DISCONTINUED | OUTPATIENT
Start: 2023-01-06 | End: 2023-01-12 | Stop reason: HOSPADM

## 2023-01-06 RX ORDER — DEXTROSE MONOHYDRATE 25 G/50ML
25-50 INJECTION, SOLUTION INTRAVENOUS
Status: DISCONTINUED | OUTPATIENT
Start: 2023-01-06 | End: 2023-01-12 | Stop reason: HOSPADM

## 2023-01-06 RX ORDER — ACETAMINOPHEN 650 MG/1
650 SUPPOSITORY RECTAL EVERY 4 HOURS PRN
Status: DISCONTINUED | OUTPATIENT
Start: 2023-01-06 | End: 2023-01-08

## 2023-01-06 RX ORDER — FENTANYL CITRATE 50 UG/ML
INJECTION, SOLUTION INTRAMUSCULAR; INTRAVENOUS
Status: DISCONTINUED | OUTPATIENT
Start: 2023-01-06 | End: 2023-01-08 | Stop reason: CLARIF

## 2023-01-06 RX ORDER — NOREPINEPHRINE BITARTRATE 0.06 MG/ML
INJECTION, SOLUTION INTRAVENOUS CONTINUOUS PRN
Status: DISCONTINUED | OUTPATIENT
Start: 2023-01-06 | End: 2023-01-08 | Stop reason: CLARIF

## 2023-01-06 RX ORDER — AMOXICILLIN 250 MG
1 CAPSULE ORAL 2 TIMES DAILY PRN
Status: DISCONTINUED | OUTPATIENT
Start: 2023-01-06 | End: 2023-01-12 | Stop reason: HOSPADM

## 2023-01-06 RX ORDER — POLYETHYLENE GLYCOL 3350 17 G/17G
17 POWDER, FOR SOLUTION ORAL DAILY
Status: DISCONTINUED | OUTPATIENT
Start: 2023-01-07 | End: 2023-01-12 | Stop reason: HOSPADM

## 2023-01-06 RX ORDER — ONDANSETRON 2 MG/ML
INJECTION INTRAMUSCULAR; INTRAVENOUS
Status: DISCONTINUED | OUTPATIENT
Start: 2023-01-06 | End: 2023-01-08 | Stop reason: CLARIF

## 2023-01-06 RX ORDER — MAGNESIUM HYDROXIDE/ALUMINUM HYDROXICE/SIMETHICONE 120; 1200; 1200 MG/30ML; MG/30ML; MG/30ML
30 SUSPENSION ORAL EVERY 4 HOURS PRN
Status: DISCONTINUED | OUTPATIENT
Start: 2023-01-06 | End: 2023-01-08

## 2023-01-06 RX ORDER — NITROGLYCERIN 5 MG/ML
VIAL (ML) INTRAVENOUS
Status: DISCONTINUED | OUTPATIENT
Start: 2023-01-06 | End: 2023-01-08 | Stop reason: CLARIF

## 2023-01-06 RX ORDER — NITROGLYCERIN 0.4 MG/1
0.4 TABLET SUBLINGUAL EVERY 5 MIN PRN
Status: DISCONTINUED | OUTPATIENT
Start: 2023-01-06 | End: 2023-01-06 | Stop reason: HOSPADM

## 2023-01-06 RX ORDER — ACETAMINOPHEN 325 MG/1
650 TABLET ORAL EVERY 4 HOURS PRN
Status: DISCONTINUED | OUTPATIENT
Start: 2023-01-06 | End: 2023-01-08

## 2023-01-06 RX ORDER — NITROGLYCERIN 0.4 MG/1
0.4 TABLET SUBLINGUAL EVERY 5 MIN PRN
Status: DISCONTINUED | OUTPATIENT
Start: 2023-01-06 | End: 2023-01-07

## 2023-01-06 RX ADMIN — MORPHINE SULFATE 4 MG: 4 INJECTION, SOLUTION INTRAMUSCULAR; INTRAVENOUS at 20:02

## 2023-01-06 RX ADMIN — TICAGRELOR 180 MG: 90 TABLET ORAL at 19:51

## 2023-01-06 RX ADMIN — NITROGLYCERIN 0.4 MG: 0.4 TABLET SUBLINGUAL at 19:53

## 2023-01-06 RX ADMIN — ONDANSETRON 4 MG: 2 INJECTION INTRAMUSCULAR; INTRAVENOUS at 19:51

## 2023-01-06 RX ADMIN — HEPARIN SODIUM 6000 UNITS: 1000 INJECTION INTRAVENOUS; SUBCUTANEOUS at 19:58

## 2023-01-06 RX ADMIN — ASPIRIN 81 MG 324 MG: 81 TABLET ORAL at 19:51

## 2023-01-06 RX ADMIN — NITROGLYCERIN 10 MCG/MIN: 20 INJECTION INTRAVENOUS at 19:55

## 2023-01-06 RX ADMIN — PROCHLORPERAZINE EDISYLATE 5 MG: 5 INJECTION INTRAMUSCULAR; INTRAVENOUS at 23:29

## 2023-01-06 ASSESSMENT — ACTIVITIES OF DAILY LIVING (ADL)
VISION_MANAGEMENT: GLASSES
DOING_ERRANDS_INDEPENDENTLY_DIFFICULTY: NO
WALKING_OR_CLIMBING_STAIRS_DIFFICULTY: NO
DRESSING/BATHING_DIFFICULTY: NO
WEAR_GLASSES_OR_BLIND: YES
DIFFICULTY_EATING/SWALLOWING: NO
CHANGE_IN_FUNCTIONAL_STATUS_SINCE_ONSET_OF_CURRENT_ILLNESS/INJURY: NO
FALL_HISTORY_WITHIN_LAST_SIX_MONTHS: NO
CONCENTRATING,_REMEMBERING_OR_MAKING_DECISIONS_DIFFICULTY: NO
TOILETING_ISSUES: NO
ADLS_ACUITY_SCORE: 35

## 2023-01-06 NOTE — Clinical Note
The first balloon was inserted into the left anterior descending and proximal left anterior descending.Max pressure = 12 margoth. Total duration = 10 seconds.     Max pressure = 12 margoth. Total duration = 10 seconds.  Balloon reinflated a fourth time: Max pressure = 12 margoth. Total duration = 10 seconds.

## 2023-01-06 NOTE — Clinical Note
The first balloon was inserted into the left anterior descending and proximal left anterior descending.Max pressure = 12 margoth. Total duration = 10 seconds.     Max pressure = 12 margoth. Total duration = 10 seconds.

## 2023-01-06 NOTE — Clinical Note
dry, intact, no bleeding and no hematoma. 6Fr arterial sheath removed from RFA. 6Fr angioseal in place. Hemostasis obtained.   6Fr venous sheath remains in RFV. Capped and to be removed when ACT <180. Covered.

## 2023-01-06 NOTE — Clinical Note
Potential access sites were evaluated for patency using ultrasound.   The right radial artery was selected. Access was obtained under with Fluoroscopic guidance using a micropuncture 21 gauge needle with direct visualization of needle entry.

## 2023-01-06 NOTE — Clinical Note
Potential access sites were evaluated for patency using ultrasound.   The right femoral vein was selected. Access was obtained under with Sonosite and Fluoroscopic guidance using a micropuncture 21 guage needle with direct visualization of needle entry.

## 2023-01-06 NOTE — Clinical Note
dry, intact, no bleeding and no hematoma. 6Fr sheath removed from RRA. TR band on with 15cc in balloon. Hemostasis obtained.

## 2023-01-06 NOTE — Clinical Note
Stent deployed in the proximal left anterior descending. Max pressure = 6 margoth. Total duration = 8 seconds.

## 2023-01-07 ENCOUNTER — APPOINTMENT (OUTPATIENT)
Dept: OCCUPATIONAL THERAPY | Facility: CLINIC | Age: 73
DRG: 246 | End: 2023-01-07
Attending: INTERNAL MEDICINE
Payer: COMMERCIAL

## 2023-01-07 ENCOUNTER — APPOINTMENT (OUTPATIENT)
Dept: CARDIOLOGY | Facility: CLINIC | Age: 73
DRG: 246 | End: 2023-01-07
Attending: STUDENT IN AN ORGANIZED HEALTH CARE EDUCATION/TRAINING PROGRAM
Payer: COMMERCIAL

## 2023-01-07 ENCOUNTER — APPOINTMENT (OUTPATIENT)
Dept: GENERAL RADIOLOGY | Facility: CLINIC | Age: 73
DRG: 246 | End: 2023-01-07
Attending: STUDENT IN AN ORGANIZED HEALTH CARE EDUCATION/TRAINING PROGRAM
Payer: COMMERCIAL

## 2023-01-07 LAB
ACT BLD: 182 SECONDS (ref 74–150)
ANION GAP SERPL CALCULATED.3IONS-SCNC: 11 MMOL/L (ref 7–15)
BUN SERPL-MCNC: 16.7 MG/DL (ref 8–23)
CALCIUM SERPL-MCNC: 7.9 MG/DL (ref 8.8–10.2)
CHLORIDE SERPL-SCNC: 104 MMOL/L (ref 98–107)
CHOLEST SERPL-MCNC: 100 MG/DL
CHOLEST SERPL-MCNC: 100 MG/DL
CREAT SERPL-MCNC: 0.9 MG/DL (ref 0.67–1.17)
DEPRECATED HCO3 PLAS-SCNC: 19 MMOL/L (ref 22–29)
GFR SERPL CREATININE-BSD FRML MDRD: 90 ML/MIN/1.73M2
GLUCOSE BLDC GLUCOMTR-MCNC: 106 MG/DL (ref 70–99)
GLUCOSE SERPL-MCNC: 162 MG/DL (ref 70–99)
HBA1C MFR BLD: 5.7 %
HDLC SERPL-MCNC: 44 MG/DL
HDLC SERPL-MCNC: 44 MG/DL
LDLC SERPL CALC-MCNC: 47 MG/DL
LDLC SERPL CALC-MCNC: 47 MG/DL
LVEF ECHO: NORMAL
NONHDLC SERPL-MCNC: 56 MG/DL
NONHDLC SERPL-MCNC: 56 MG/DL
NT-PROBNP SERPL-MCNC: 512 PG/ML (ref 0–900)
POTASSIUM SERPL-SCNC: 3.6 MMOL/L (ref 3.4–5.3)
SARS-COV-2 RNA RESP QL NAA+PROBE: NEGATIVE
SODIUM SERPL-SCNC: 134 MMOL/L (ref 136–145)
TRIGL SERPL-MCNC: 43 MG/DL
TRIGL SERPL-MCNC: 43 MG/DL
TROPONIN T SERPL HS-MCNC: 1635 NG/L
TSH SERPL DL<=0.005 MIU/L-ACNC: 3.23 UIU/ML (ref 0.3–4.2)

## 2023-01-07 PROCEDURE — 80048 BASIC METABOLIC PNL TOTAL CA: CPT | Performed by: STUDENT IN AN ORGANIZED HEALTH CARE EDUCATION/TRAINING PROGRAM

## 2023-01-07 PROCEDURE — 97535 SELF CARE MNGMENT TRAINING: CPT | Mod: GO | Performed by: OCCUPATIONAL THERAPIST

## 2023-01-07 PROCEDURE — 999N000208 ECHOCARDIOGRAM COMPLETE

## 2023-01-07 PROCEDURE — 200N000002 HC R&B ICU UMMC

## 2023-01-07 PROCEDURE — 255N000002 HC RX 255 OP 636: Performed by: STUDENT IN AN ORGANIZED HEALTH CARE EDUCATION/TRAINING PROGRAM

## 2023-01-07 PROCEDURE — 80061 LIPID PANEL: CPT | Performed by: STUDENT IN AN ORGANIZED HEALTH CARE EDUCATION/TRAINING PROGRAM

## 2023-01-07 PROCEDURE — U0003 INFECTIOUS AGENT DETECTION BY NUCLEIC ACID (DNA OR RNA); SEVERE ACUTE RESPIRATORY SYNDROME CORONAVIRUS 2 (SARS-COV-2) (CORONAVIRUS DISEASE [COVID-19]), AMPLIFIED PROBE TECHNIQUE, MAKING USE OF HIGH THROUGHPUT TECHNOLOGIES AS DESCRIBED BY CMS-2020-01-R: HCPCS | Performed by: STUDENT IN AN ORGANIZED HEALTH CARE EDUCATION/TRAINING PROGRAM

## 2023-01-07 PROCEDURE — 85347 COAGULATION TIME ACTIVATED: CPT

## 2023-01-07 PROCEDURE — 93005 ELECTROCARDIOGRAM TRACING: CPT

## 2023-01-07 PROCEDURE — 71045 X-RAY EXAM CHEST 1 VIEW: CPT | Mod: 26 | Performed by: RADIOLOGY

## 2023-01-07 PROCEDURE — 97165 OT EVAL LOW COMPLEX 30 MIN: CPT | Mod: GO | Performed by: OCCUPATIONAL THERAPIST

## 2023-01-07 PROCEDURE — 250N000011 HC RX IP 250 OP 636: Performed by: STUDENT IN AN ORGANIZED HEALTH CARE EDUCATION/TRAINING PROGRAM

## 2023-01-07 PROCEDURE — 97110 THERAPEUTIC EXERCISES: CPT | Mod: GO | Performed by: OCCUPATIONAL THERAPIST

## 2023-01-07 PROCEDURE — 93010 ELECTROCARDIOGRAM REPORT: CPT | Performed by: INTERNAL MEDICINE

## 2023-01-07 PROCEDURE — 71045 X-RAY EXAM CHEST 1 VIEW: CPT

## 2023-01-07 PROCEDURE — 99233 SBSQ HOSP IP/OBS HIGH 50: CPT | Mod: 25 | Performed by: INTERNAL MEDICINE

## 2023-01-07 PROCEDURE — 93306 TTE W/DOPPLER COMPLETE: CPT | Mod: 26 | Performed by: STUDENT IN AN ORGANIZED HEALTH CARE EDUCATION/TRAINING PROGRAM

## 2023-01-07 PROCEDURE — 84484 ASSAY OF TROPONIN QUANT: CPT | Performed by: STUDENT IN AN ORGANIZED HEALTH CARE EDUCATION/TRAINING PROGRAM

## 2023-01-07 PROCEDURE — 250N000013 HC RX MED GY IP 250 OP 250 PS 637: Performed by: STUDENT IN AN ORGANIZED HEALTH CARE EDUCATION/TRAINING PROGRAM

## 2023-01-07 PROCEDURE — 83036 HEMOGLOBIN GLYCOSYLATED A1C: CPT | Performed by: STUDENT IN AN ORGANIZED HEALTH CARE EDUCATION/TRAINING PROGRAM

## 2023-01-07 PROCEDURE — 250N000013 HC RX MED GY IP 250 OP 250 PS 637: Performed by: INTERNAL MEDICINE

## 2023-01-07 PROCEDURE — 84443 ASSAY THYROID STIM HORMONE: CPT | Performed by: STUDENT IN AN ORGANIZED HEALTH CARE EDUCATION/TRAINING PROGRAM

## 2023-01-07 PROCEDURE — 83880 ASSAY OF NATRIURETIC PEPTIDE: CPT | Performed by: STUDENT IN AN ORGANIZED HEALTH CARE EDUCATION/TRAINING PROGRAM

## 2023-01-07 RX ORDER — METOPROLOL TARTRATE 1 MG/ML
5 INJECTION, SOLUTION INTRAVENOUS
Status: DISCONTINUED | OUTPATIENT
Start: 2023-01-07 | End: 2023-01-12 | Stop reason: HOSPADM

## 2023-01-07 RX ORDER — ATORVASTATIN CALCIUM 80 MG/1
80 TABLET, FILM COATED ORAL DAILY
Status: DISCONTINUED | OUTPATIENT
Start: 2023-01-07 | End: 2023-01-12 | Stop reason: HOSPADM

## 2023-01-07 RX ORDER — LIDOCAINE 4 G/G
1 PATCH TOPICAL
Status: DISCONTINUED | OUTPATIENT
Start: 2023-01-07 | End: 2023-01-12 | Stop reason: HOSPADM

## 2023-01-07 RX ORDER — SUCRALFATE 1 G/1
1 TABLET ORAL 4 TIMES DAILY
COMMUNITY
End: 2023-05-01

## 2023-01-07 RX ORDER — ASPIRIN 81 MG/1
81 TABLET ORAL DAILY
Status: DISCONTINUED | OUTPATIENT
Start: 2023-01-07 | End: 2023-01-07

## 2023-01-07 RX ORDER — HYDRALAZINE HYDROCHLORIDE 20 MG/ML
10 INJECTION INTRAMUSCULAR; INTRAVENOUS EVERY 4 HOURS PRN
Status: DISCONTINUED | OUTPATIENT
Start: 2023-01-07 | End: 2023-01-12 | Stop reason: HOSPADM

## 2023-01-07 RX ORDER — FAMOTIDINE 20 MG/1
20 TABLET, FILM COATED ORAL 2 TIMES DAILY
COMMUNITY

## 2023-01-07 RX ORDER — TAMSULOSIN HYDROCHLORIDE 0.4 MG/1
0.4 CAPSULE ORAL DAILY
COMMUNITY

## 2023-01-07 RX ORDER — LOSARTAN POTASSIUM 25 MG/1
25 TABLET ORAL DAILY
Status: DISCONTINUED | OUTPATIENT
Start: 2023-01-07 | End: 2023-01-09

## 2023-01-07 RX ORDER — LOSARTAN POTASSIUM 50 MG/1
100 TABLET ORAL DAILY
COMMUNITY
End: 2023-05-01

## 2023-01-07 RX ORDER — ASPIRIN 81 MG/1
81 TABLET, CHEWABLE ORAL ONCE
Status: DISCONTINUED | OUTPATIENT
Start: 2023-01-07 | End: 2023-01-07 | Stop reason: CLARIF

## 2023-01-07 RX ORDER — ROSUVASTATIN CALCIUM 20 MG/1
40 TABLET, COATED ORAL DAILY
Status: DISCONTINUED | OUTPATIENT
Start: 2023-01-07 | End: 2023-01-07

## 2023-01-07 RX ORDER — NITROGLYCERIN 0.4 MG/1
0.4 TABLET SUBLINGUAL EVERY 5 MIN PRN
Status: DISCONTINUED | OUTPATIENT
Start: 2023-01-07 | End: 2023-01-12 | Stop reason: HOSPADM

## 2023-01-07 RX ORDER — ATORVASTATIN CALCIUM 40 MG/1
40 TABLET, FILM COATED ORAL DAILY
Status: ON HOLD | COMMUNITY
End: 2023-01-11

## 2023-01-07 RX ORDER — ASPIRIN 81 MG/1
81 TABLET, CHEWABLE ORAL DAILY
Status: DISCONTINUED | OUTPATIENT
Start: 2023-01-07 | End: 2023-01-12 | Stop reason: HOSPADM

## 2023-01-07 RX ORDER — KETOROLAC TROMETHAMINE 15 MG/ML
15 INJECTION, SOLUTION INTRAMUSCULAR; INTRAVENOUS
Status: COMPLETED | OUTPATIENT
Start: 2023-01-07 | End: 2023-01-07

## 2023-01-07 RX ADMIN — TICAGRELOR 90 MG: 90 TABLET ORAL at 19:50

## 2023-01-07 RX ADMIN — ENOXAPARIN SODIUM 40 MG: 40 INJECTION SUBCUTANEOUS at 08:24

## 2023-01-07 RX ADMIN — ACETAMINOPHEN 650 MG: 325 TABLET, FILM COATED ORAL at 00:30

## 2023-01-07 RX ADMIN — LOSARTAN POTASSIUM 25 MG: 25 TABLET, FILM COATED ORAL at 08:24

## 2023-01-07 RX ADMIN — HUMAN ALBUMIN MICROSPHERES AND PERFLUTREN 6 ML: 10; .22 INJECTION, SOLUTION INTRAVENOUS at 10:10

## 2023-01-07 RX ADMIN — ATORVASTATIN CALCIUM 80 MG: 80 TABLET, FILM COATED ORAL at 08:19

## 2023-01-07 RX ADMIN — LIDOCAINE PATCH 4% 1 PATCH: 40 PATCH TOPICAL at 13:32

## 2023-01-07 RX ADMIN — KETOROLAC TROMETHAMINE 15 MG: 15 INJECTION, SOLUTION INTRAMUSCULAR; INTRAVENOUS at 17:16

## 2023-01-07 RX ADMIN — ACETAMINOPHEN 650 MG: 325 TABLET, FILM COATED ORAL at 08:24

## 2023-01-07 RX ADMIN — ASPIRIN 81 MG: 81 TABLET ORAL at 08:24

## 2023-01-07 RX ADMIN — MELATONIN TAB 5 MG 5 MG: 5 TAB at 22:26

## 2023-01-07 RX ADMIN — ACETAMINOPHEN 650 MG: 325 TABLET, FILM COATED ORAL at 19:50

## 2023-01-07 RX ADMIN — Medication 12.5 MG: at 16:22

## 2023-01-07 RX ADMIN — SENNOSIDES AND DOCUSATE SODIUM 1 TABLET: 50; 8.6 TABLET ORAL at 21:07

## 2023-01-07 RX ADMIN — ASPIRIN 81 MG: 81 TABLET, CHEWABLE ORAL at 08:24

## 2023-01-07 RX ADMIN — ACETAMINOPHEN 650 MG: 325 TABLET, FILM COATED ORAL at 13:00

## 2023-01-07 RX ADMIN — TICAGRELOR 90 MG: 90 TABLET ORAL at 08:24

## 2023-01-07 ASSESSMENT — ACTIVITIES OF DAILY LIVING (ADL)
ADLS_ACUITY_SCORE: 28
ADLS_ACUITY_SCORE: 43
ADLS_ACUITY_SCORE: 28
PREVIOUS_RESPONSIBILITIES: MEAL PREP;HOUSEKEEPING;LAUNDRY;SHOPPING;YARDWORK;MEDICATION MANAGEMENT;FINANCES;DRIVING
ADLS_ACUITY_SCORE: 28

## 2023-01-07 NOTE — PLAN OF CARE
ICU End of Shift Summary. See flowsheets for vital signs and detailed assessment.    Changes this shift: Afebrile. A&Ox4, Follows commands, MARQUEZ. SB-SR 50s-70s. BP stable. On RA, occasionally needs 2L when sleeping. Up to bathroom, voiding appropriately. Ambulating in room w/ standby assist, in hallway w/ OT. C/O sternal pain, PRN tylenol x2, Lidocaine patch ordered, PRN toradol.     Plan: Transfer to floor when bed available    Goal Outcome Evaluation:      Plan of Care Reviewed With: patient    Overall Patient Progress: improvingOverall Patient Progress: improving    Outcome Evaluation: VSS. Ambulated unit w/ OT. C/O sternal pain from CPR.

## 2023-01-07 NOTE — ED PROVIDER NOTES
History     Chief Complaint   Patient presents with     Chest Pain     Chest pain, dizziness, bilateral upper extremity tingling, sweats     HPI  Jordan Fisher is a 72 year old male who presents with right parasternal chest pain described as sharp nonradiating associated nausea and shortness of air began an hour and a half ago while sedentary.  Denies similar symptoms in the past.  Treated hypertension hyperlipidemia.  Non-smoker no alcohol no illicit substance use.    Allergies:  Allergies   Allergen Reactions     Amoxicillin Unknown     Other reaction(s): Other (see comments)  Unsure, long time ago       Niacin Unknown     Shellfish Containing Products [Shellfish-Derived Products] Unknown     Lisinopril Cough       Problem List:    Patient Active Problem List    Diagnosis Date Noted     Abnormal colonoscopy 07/16/2022     Priority: Medium     Feb 19, 2015 Entered By: CHAY RODRIGUEZ Comment: 1 polyp -[place] - colonscopy       Anxiety state 07/16/2022     Priority: Medium     History of erectile dysfunction 07/16/2022     Priority: Medium     Hyperlipidemia 07/16/2022     Priority: Medium     Insomnia 07/16/2022     Priority: Medium     Other ill-defined and unknown causes of morbidity and mortality 07/16/2022     Priority: Medium     Feb 19, 2015 Entered By: CHAY RODRIGUEZ Comment: [facility] - 2014       Current episode of major depressive disorder without prior episode, unspecified depression episode severity 07/16/2022     Priority: Medium     Macular degeneration 07/16/2022     Priority: Medium     Nonexudative age-related macular degeneration, right eye, advanced atrophic without subfoveal involvement 07/16/2022     Priority: Medium     Nonexudative age-related macular degeneration, bilateral, intermediate dry stage 07/16/2022     Priority: Medium     Nonexudative age-related macular degeneration 07/16/2022     Priority: Medium     Essential (primary) hypertension 07/16/2022     Priority: Medium      Sleep apnea 07/16/2022     Priority: Medium     Chronic neck pain 06/30/2021     Priority: Medium     Formatting of this note might be different from the original.  Careplan:  Background:  6/30/2021: left lateral neck pain at times, is positional. No weakness/numbness/tingling. No known injuries. normal sensation, normal strength, negative spurling.     Goals/Recommendations:  6/30/2021:   Chronic neck pain  Please make an appointment with:-     [facility]        Acetaminophen 1000 mg by mouth 3 times daily       Lumbar radicular pain 06/30/2021     Priority: Medium     Formatting of this note might be different from the original.  Careplan:  Background:  6/30/2021:   Location: right lateral thigh pain radiates to right buttock and lumbar region  Palliation: rest  Quality: tight, crampy  Radiation: as above  Severity: 5-9/10  Timing:  Off and on for years, worst in the last 1 month    Goals/Recommendations:  6/30/2021:   Lumbar radicular pain  Please make an appointment with: -     [facility]  Please schedule:-     MR Lumbar Spine WO IV Cont; Future  Please start with food-     methylPREDNISolone (MEDROL 21 TABLET DOSEPACK) 4 MG tablet; Follow package directions       Acetaminophen 1000 mg by mouth 3 times daily       Lymphedema 03/30/2021     Priority: Medium     Formatting of this note might be different from the original.  Careplan:  Background:    11/12/2021:  He is on of edema treatment.  He is currently using compression stockings in doing massage.  He states that his symptoms are stable.  4/28/2021: L>R LE edema. Jordan is taking his medications. He recently decreased from 10 mg of amlodipine to 5 mg of amlodipine.  He has not noted a change in his leg swelling.  Blood pressure remains normal.  Discussed trial of stopping amlodipine and starting metoprolol instead for blood pressure control and cardiovascular protection.  He is in agreement with this plan.  Side effects of  medications discussed.  He has had some improvement with compression stockings and lymphedema treatment.  He has had negative arterial and venous scans.  See echocardiogram results for details as well as above.  BNP is normal.    Goals/Recommendations:    11/12/2021: continue current   4/28/2021:  Essential hypertension (HRC)  Secondary lymphedema  Stop amlodipine  Start: -     metoprolol succinate (TOPROL XL) 50 MG 24 hour release tablet; Take 1 Tablet by mouth daily.  Continue compression stockings, lymphedema massage, elevate legs when sitting and low salt diet.       Mild aortic valve regurgitation 03/18/2021     Priority: Medium     Mild ascending aorta dilation (H) 03/18/2021     Priority: Medium     Mild mitral valve regurgitation 03/18/2021     Priority: Medium     Mild tricuspid valve regurgitation 03/18/2021     Priority: Medium     CHUCK (obstructive sleep apnea) 03/20/2019     Priority: Medium     Left inguinal hernia 02/04/2019     Priority: Medium     Added automatically from request for surgery [id number]      Formatting of this note might be different from the original.  Added automatically from request for surgery [id number]       Nuclear sclerotic cataract of both eyes 06/13/2018     Priority: Medium     Diverticulosis of small intestine without hemorrhage 02/08/2016     Priority: Medium     Ambrose's esophagus without dysplasia 02/08/2016     Priority: Medium     Formatting of this note might be different from the original.  Careplan:  Background:    11/12/2021:  Has had recent visit at the [facility].  EGD done and biopsy negative for dysplasia.  They had recommended increasing PPI.  He feels that omeprazole 40 mg daily works well for him.  4/28/2021: occasional epigastric pain.  He has not been taking a proton pump inhibitor.  No reflux symptoms.  Dx EGD 2/4/2016, completed 2nd EGD 9/2016    Goals/Recommendations:    11/12/2021:     Gastroesophageal reflux disease, unspecified whether esophagitis  present  Ambrose's esophagus without dysplasia  Continue current -     omeprazole (PRILOSEC) 20 MG capsule; Take 2 Capsules by mouth daily. Take 1 hour before a meal.  4/28/2021:   Gastroesophageal reflux disease, unspecified whether esophagitis present  Ambrose's esophagus without dysplasia  Continue: -     omeprazole (PRILOSEC) 20 MG capsule; Take 1 Capsule by mouth daily. Take 1 hour before a meal.  Please stop by lab today for: -     Magnesium; Future        Please schedule: -     Egd (Endoscopy)  2/8/16:   To for 1. Protonix 40 mg  2. Zantac 150 bid as needed  2. Repeat EGD with MAC 9/2019       Hiatal hernia 02/05/2016     Priority: Medium     Ambrose's esophagus with esophagitis 01/01/2016     Priority: Medium     Aug 01, 2016 Entered By: JULIO RODRIGUEZ Comment: egd done at [facility] in addition haital hernia was noted.Sep 07, 2017 Entered By: JULIO RODRIGUEZ Comment: 2016.       Essential hypertension 10/26/2015     Priority: Medium     AMD (age related macular degeneration) 11/12/2014     Priority: Medium     Epiretinal membrane (ERM) of right eye 11/12/2014     Priority: Medium     Presbyopia 11/12/2014     Priority: Medium     Esophageal Reflux      Priority: Medium     Created by Conversion         Depression With Anxiety      Priority: Medium     Created by Conversion      Formatting of this note might be different from the original.  Created by Conversion       TMJ (dislocation of temporomandibular joint) 06/19/2013     Priority: Medium     Overweight 09/11/2012     Priority: Medium     Gastroesophageal reflux disease 09/11/2012     Priority: Medium     Formatting of this note might be different from the original.  Created by Conversion  Formatting of this note might be different from the original.  Careplan:  Background:    4/28/2021: occasional epigastric pain.  He has not been taking a proton pump inhibitor.  No reflux symptoms.  Dx EGD 2/4/2016, completed 2nd EGD  9/2016    Goals/Recommendations:    4/28/2021:   Gastroesophageal reflux disease, unspecified whether esophagitis present  Ambrose's esophagus without dysplasia  Continue: -     omeprazole (PRILOSEC) 20 MG capsule; Take 1 Capsule by mouth daily. Take 1 hour before a meal.  Please stop by lab today for: -     Magnesium; Future        Please schedule: -     Egd (Endoscopy)  2/8/16:   To for 1. Protonix 40 mg  2. Zantac 150 bid as needed  2. Repeat EGD with MAC 9/2019  Sep 10, 2018 Entered By: JULIO RODRIGUEZ Comment: S/P NISSEN FUNDOPLICATION--2017       Benign prostatic hyperplasia 01/01/2011     Priority: Medium     Formatting of this note is different from the original.  Careplan:  Background:  11/12/2021: Jordan denies complaints. Jordan is taking his medications as directed and denies side effects. He states that symptoms are stable.   Prostatic Specific Antigen (ng/mL)   Date Value   02/25/2021 2.1     Goals/Recommendations:  11/12/2021:   Benign prostatic hyperplasia with lower urinary tract symptoms, symptom details unspecified  Continue: -     tamsulosin (FLOMAX) 0.4 MG CAPS capsule; Take 1 Capsule by mouth daily.          Past Medical History:    No past medical history on file.    Past Surgical History:    Past Surgical History:   Procedure Laterality Date     CHOLECYSTECTOMY         Family History:    No family history on file.    Social History:  Marital Status:   [2]  Social History     Tobacco Use     Smoking status: Never     Smokeless tobacco: Never   Substance Use Topics     Alcohol use: Yes     Drug use: No        Medications:    amLODIPine (NORVASC) 5 MG tablet  aspirin 81 mg chewable tablet  hydroCHLOROthiazide (HYDRODIURIL) 12.5 MG tablet  meclizine (ANTIVERT) 25 MG tablet  multivitamin with minerals tablet  sildenafil (VIAGRA) 50 MG tablet          Review of Systems  All other systems reviewed and are negative.    Physical Exam   BP: (!) 158/93  Pulse: 60  Temp: 97.8  F (36.6  C)  Resp:  "18  Height: 170.2 cm (5' 7\")  Weight: 87.1 kg (192 lb)  SpO2: 98 %      Physical Exam  Nontoxic appearing no respiratory distress alert and oriented ×3 skin pale warm and dry appears uncomfortable  Head atraumatic normocephalic   Neck supple full active painless range of motion  Lungs clear to auscultation  Heart regular no murmur  Abdomen soft nontender bowel sounds positive   Strength and sensation grossly intact throughout the extremities  Speech is fluent, good eye contact, thought processes are rational  Lower extremities without swelling, redness or tenderness  Pedal pulses symmetrical and strong    ED Course          EKG time 1935, symptoms chest pain, heart rate 60 normal sinus rhythm first-degree AV block , inferior ST elevation with T wave inversion in 3, ST elevation in lead V3-V6.  New compared to previous, read by Dr. Abisai Winter       Procedures              Critical Care time:  was 30 minutes for this patient excluding procedures.     The patient has a STEMI        ASA given in the ER                       Results for orders placed or performed during the hospital encounter of 01/06/23 (from the past 24 hour(s))   CBC with platelets differential    Narrative    The following orders were created for panel order CBC with platelets differential.  Procedure                               Abnormality         Status                     ---------                               -----------         ------                     CBC with platelets and d...[[id number]]                                                   Please view results for these tests on the individual orders.   [place] Draw    Narrative    The following orders were created for panel order [place] Draw.  Procedure                               Abnormality         Status                     ---------                               -----------         ------                     Extra Blue Top Tube[[id number]]                              In " process                 Extra Red Top Tube[[id number]]                                                          Extra Green Top (Lithium...[[id number]]                                                 Extra Purple Top Tube[[id number]]                                                         Please view results for these tests on the individual orders.   CBC with platelets, differential    Narrative    The following orders were created for panel order CBC with platelets, differential.  Procedure                               Abnormality         Status                     ---------                               -----------         ------                     CBC with platelets and d...[[id number]]  Abnormal            Final result                 Please view results for these tests on the individual orders.   Comprehensive metabolic panel   Result Value Ref Range    Sodium 137 136 - 145 mmol/L    Potassium 3.5 3.4 - 5.3 mmol/L    Chloride 103 98 - 107 mmol/L    Carbon Dioxide (CO2) 22 22 - 29 mmol/L    Anion Gap 12 7 - 15 mmol/L    Urea Nitrogen 18.8 8.0 - 23.0 mg/dL    Creatinine 0.86 0.67 - 1.17 mg/dL    Calcium 8.8 8.8 - 10.2 mg/dL    Glucose 125 (H) 70 - 99 mg/dL    Alkaline Phosphatase 89 40 - 129 U/L    AST 20 10 - 50 U/L    ALT 19 10 - 50 U/L    Protein Total 7.5 6.4 - 8.3 g/dL    Albumin 4.0 3.5 - 5.2 g/dL    Bilirubin Total 0.9 <=1.2 mg/dL    GFR Estimate >90 >60 mL/min/1.73m2   Troponin T, High Sensitivity   Result Value Ref Range    Troponin T, High Sensitivity 13 <=22 ng/L   CBC with platelets and differential   Result Value Ref Range    WBC Count 8.6 4.0 - 11.0 10e3/uL    RBC Count 4.39 (L) 4.40 - 5.90 10e6/uL    Hemoglobin 13.4 13.3 - 17.7 g/dL    Hematocrit 39.7 (L) 40.0 - 53.0 %    MCV 90 78 - 100 fL    MCH 30.5 26.5 - 33.0 pg    MCHC 33.8 31.5 - 36.5 g/dL    RDW 12.6 10.0 - 15.0 %    Platelet Count 155 150 - 450 10e3/uL    % Neutrophils 49 %    % Lymphocytes 39 %    % Monocytes 9 %    % Eosinophils 3 %    %  Basophils 0 %    % Immature Granulocytes 0 %    NRBCs per 100 WBC 0 <1 /100    Absolute Neutrophils 4.2 1.6 - 8.3 10e3/uL    Absolute Lymphocytes 3.3 0.8 - 5.3 10e3/uL    Absolute Monocytes 0.8 0.0 - 1.3 10e3/uL    Absolute Eosinophils 0.2 0.0 - 0.7 10e3/uL    Absolute Basophils 0.0 0.0 - 0.2 10e3/uL    Absolute Immature Granulocytes 0.0 <=0.4 10e3/uL    Absolute NRBCs 0.0 10e3/uL   INR   Result Value Ref Range    INR 1.15 0.85 - 1.15   Partial thromboplastin time   Result Value Ref Range    aPTT 28 22 - 38 Seconds       Medications   ondansetron (ZOFRAN) injection 4 mg (4 mg Intravenous Given 1/6/23 1951)   aspirin (ASA) chewable tablet 324 mg (324 mg Oral Given 1/6/23 1951)   ticagrelor (BRILINTA) tablet 180 mg (180 mg Oral Given 1/6/23 1951)   heparin (porcine) inj 1000 units/mL SYR (rounds in 500 unit increments) (6,000 Units Intravenous Given 1/6/23 1958)   morphine (PF) injection 4 mg (4 mg Intravenous Given 1/6/23 2002)       Assessments & Plan (with Medical Decision Making)  Right precordial chest pain an hour and a half, ST elevation inferior and lateral, treated as above, remained stable chest discomfort waning upon discharge, reviewed with cardiology and ED staff at [facility] who accept patient in transfer to Cath Lab.     I have reviewed the nursing notes.    I have reviewed the findings, diagnosis, plan and need for follow up with the patient.     Critical Care Addendum    My initial assessment, based on my review of nursing observations, review of vital signs, focused history, physical exam, review of cardiac rhythm monitor and 12 lead ECG analysis, established that Jordan Fisher has and STEMI, which requires immediate intervention, and therefore he is critically ill.     After the initial assessment, the care team initiated multiple lab tests and initiated medication therapy with Nitroglycerin, ticagrelor, aspirin, heparin, morphine to provide stabilization care. Due to the critical nature of this  patient, I reassessed nursing observations, vital signs, physical exam, review of cardiac rhythm monitor and 12 lead ECG analysis multiple times prior to his disposition.     Time also spent performing reviewing case with family, discussion with cardiology as well as ED staff at [facility] who accepts patient in transfer.     Critical care time (excluding teaching time and procedures): 30 minutes.             Discharge Medication List as of 1/6/2023  8:15 PM          Final diagnoses:   ST elevation myocardial infarction (STEMI), unspecified artery (H)       1/6/2023   [facility] EMERGENCY DEPT     Abisai Winter MD  01/06/23 2047

## 2023-01-07 NOTE — PROGRESS NOTES
01/07/23 1709   Appointment Info   Signing Clinician's Name / Credentials (OT) Joanna Berger OTR/L   Rehab Comments (OT) new MI   Living Environment   People in Home spouse   Current Living Arrangements house   Living Environment Comments Pt lives in a single family house with his wife.  He drives.   Self-Care   Usual Activity Tolerance excellent   Current Activity Tolerance moderate   Regular Exercise Yes   Activity/Exercise Type walking   Exercise Amount/Frequency daily   Equipment Currently Used at Home none   Fall history within last six months no   Activity/Exercise/Self-Care Comment Pt is very active at baseline, he frequently walks his dogs.   Instrumental Activities of Daily Living (IADL)   Previous Responsibilities meal prep;housekeeping;laundry;shopping;yardwork;medication management;finances;driving  (shares with his wife)   General Information   Onset of Illness/Injury or Date of Surgery 01/06/23   Referring Physician Cuco   Patient/Family Therapy Goal Statement (OT) dc home   Additional Occupational Profile Info/Pertinent History of Current Problem 72 year old male who has a history of hypertension, hyperlipidemia who presents as an outside transfer for chest pain found to have anterior ST elevation.  His course has been complicated by ventricular fibrillation and cardiac arrest status post defibrillation with ROSC.  Coronary angiogram revealed obstructive coronary artery disease in the proximal to mid LAD now status post successful revascularization who was admitted to the ICU for post coronary angiogram and intervention cares after his NSTEMI.   Performance Patterns (Routines, Roles, Habits) retired [employer]    Existing Precautions/Restrictions cardiac   General Observations and Info activity up w A, ambulate TID   Cognitive Status Examination   Orientation Status orientation to person, place and time   Affect/Mental Status (Cognitive) WFL   Visual Perception   Visual  Impairment/Limitations WFL   Pain Assessment   Patient Currently in Pain Yes, see Vital Sign flowsheet   Posture   Posture Comments intact   Range of Motion Comprehensive   Comment, General Range of Motion WFL limited by chest pain   Strength Comprehensive (MMT)   Comment, General Manual Muscle Testing (MMT) Assessment NT   Coordination   Upper Extremity Coordination No deficits were identified   Bed Mobility   Comment (Bed Mobility) IND   Transfers   Transfer Comments IND   Balance   Balance Comments intact   Activities of Daily Living   BADL Assessment/Intervention other (see comments)  (IND ADLs although moving slowly)   Clinical Impression   Criteria for Skilled Therapeutic Interventions Met (OT) Yes, treatment indicated   OT Diagnosis MI   Influenced by the following impairments post procedure fatigue   OT Problem List-Impairments impacting ADL activity tolerance impaired;problems related to;mobility   Assessment of Occupational Performance 1-3 Performance Deficits   Identified Performance Deficits home management, dog walking   Planned Therapy Interventions (OT) ADL retraining;IADL retraining   Clinical Decision Making Complexity (OT) low complexity   Risk & Benefits of therapy have been explained patient;participants included;participants voiced agreement with care plan;current/potential barriers reviewed;risks/benefits reviewed;care plan/treatment goals reviewed;evaluation/treatment results reviewed   OT Total Evaluation Time   OT Eval, Low Complexity Minutes (17397) 10   OT Discharge Planning   OT Plan ambulate (>3 laps around 4C) no AE, OMNI 4   OT Discharge Recommendation (DC Rec) home;home with outpatient cardiac rehab   OT Rationale for DC Rec Pt doing well, IND w ADLs although limited IADLs due to fatigue.  Able to ambulate functional distances although c/o pain at chest from CPR and shortness of breath with activity.   OT Brief overview of current status OMNI 4 ambualtion

## 2023-01-07 NOTE — ED TRIAGE NOTES
Chest pain 8/10- sharp. Has tingling in bilateral upper extremities from elbow up. Dizzy feeling.      Triage Assessment     Row Name 01/06/23 1936       Triage Assessment (Adult)    Airway WDL WDL       Cardiac WDL    Cardiac WDL chest pain       Chest Pain Assessment    Chest Pain Intervention 12-lead ECG obtained;cardiac biomarkers drawn;cardiac monitor placed       Cognitive/Neuro/Behavioral WDL    Cognitive/Neuro/Behavioral WDL WDL

## 2023-01-07 NOTE — PROGRESS NOTES
[facility]    Cardiology Progress Note- ICU     Assessment and Plan:     72 year old male who has a history of hypertension, hyperlipidemia who presents as an outside transfer for chest pain found to have anterior ST elevation.  His course has been complicated by ventricular fibrillation and cardiac arrest status post defibrillation with ROSC.  Coronary angiogram revealed obstructive coronary artery disease in the proximal to mid LAD now status post successful revascularization who was admitted to the ICU for post coronary angiogram and intervention cares after his STEMI.    Interval Events:  No acute events overnight. Reports ongoing chest wall pain this morning, improved from yesterday. No shortness of breath.     Tele: Had one run of asymptomatic NSVT(6 beats) overnight    Plan today:  - Cardiac rehab inpatient consult   - Optimizing post-MI GDMT  - Transfer to floor     ==================================  # STEMI s/p PCI with SARITHA to mLAD   Found to have 99% thrombotic lesion in prox-to-mid LAD s/p successful PCI with SARITHA x 1. A1C and lipid panel unremarkable.   - Continue daily aspirin 81 mg and ticagrelor 90 mg twice daily. Plan for 1 year of DAPT, followed by ASA monotherapy.   - Continue atorvastatin 80 mg daily  - Started on metoprolol succinate 12.5 mg daily and losartan 25 mg daily for guideline directed medical therapy post STEMI  - F/u TTE  - Continue telemetry and O2 monitoring     # Vfib arrest with successful ROSC  This was likely in the setting of his acute coronary event. Started beta-blockade with metoprolol 12.5 mg as above.   - Continue cardiac telemetry for now.   - No indication for ICD post-cardiac arrest since patient's arrest was likely due to ischemia and he has been revascularized.     # Hyperlipidemia  # Coronary artery disease  - Continue high intensity statin therapy with Atorvastatin 80 mg daily      # Hypertension  - Metoprolol and losartan as  above       FEN: ADAT  PPx: Lovenox  Code: Full code     Dispo: Transfer to [facility]    Discussed with attending, Dr. Dillon. Please, see staff addendum for changes/additions to the plan.    Stormy Valencia MD  Cardiology Fellow     Objective:   /72   Pulse 57   Temp 98.7  F (37.1  C) (Oral)   Resp 20   Wt 89.6 kg (197 lb 8.5 oz)   SpO2 96%   BMI 30.94 kg/m    Temp (24hrs), Av.5  F (36.9  C), Min:97.8  F (36.6  C), Max:98.9  F (37.2  C)    Physical Exam   GEN: No acute distress   HEENT: EOMI, no icterus, moist mucous membranes   CV: RRR, normal s1/s2, no murmurs. JVP not elevated   CHEST: Normal work of breathing. Lungs CTAB, no wheezes or crackles. Chest wall tender to palpation   ABD: Soft, NT/ND   EXT: Warm and well-perfused, No LE edema  NEURO: Awake, alert, answering questions appropriately, motor grossly nonfocal  SKIN: No worrisome lesions  PSYCH: cooperative, affect appropriate    DATA:   Labs, EKGs and imaging reviewed. Pertinent results discussed in assessment and plan above.

## 2023-01-07 NOTE — H&P
[facility]    Cardiology History and Physical - Interventional    Assessment:72 year old male who has a history of hypertension, hyperlipidemia who presents as an outside transfer for chest pain found to have anterior ST elevation.  His course has been complicated by ventricular fibrillation and cardiac arrest status post defibrillation with ROSC.  Coronary angiogram revealed obstructive coronary artery disease in the proximal to mid LAD now status post successful revascularization who was admitted to the ICU for post coronary angiogram and intervention cares after his NSTEMI.    #NSTEMI- in the setting of obstructive coronary artery disease to the proximal and mid LAD now status post successful stenting of the mid and proximal LAD  - Maintain cardiac telemetry in the setting of postarrest and stenting  - SPO2 monitoring continuously  - Follow-up lipid panel  --Follow-up TSH  - Follow-up hemoglobin A1c  - Follow-up post NSTEMI echo in a.m.  -Continue rosuvastatin 40 mg daily  - Continue as needed nitroglycerin  - Continue daily aspirin 81 mg as well as ticagrelor 90 mg twice daily in the setting of SARITHA  -nitiate metoprolol XL 12.5 mg daily for guideline directed medical therapy post NSTEMI  -Initiate losartan 25 mg daily for guideline directed medical therapy post NSTEMI, if post NSTEMI echo demonstrates heart failure with reduced ejection fraction this can be switched to Entresto either prior to discharge or as an outpatient    #hyperlipidemia  #Coronary artery disease  Continue high intensity statin therapy with rosuvastatin 40 mg daily    #Hypertension  - We will initiate losartan daily 25 mg for hypertension as well as routine post NSTEMI guideline directed therapy    #VT arrest with successful ROSC  This was likely in the setting of his acute coronary event, we will initiate beta-blockade with metoprolol 12.5 mg as above and continue cardiac telemetry for  "now  ICD currently not indicated as this was likely in the setting of ischemia      Assessment & Plan: HVSL    Jordan Fisher is a 72 year old male admitted on (Not on file).    Diet: Advance Diet as Tolerated: Clear Liquid Diet    DVT Prophylaxis: Enoxaparin (Lovenox) SQ  Mcelroy Catheter: Not present  Cardiac Monitoring: ACTIVE order. Indication: ICU  Code Status: Full Code          Clinically Significant Risk Factors Present on Admission                  # Obesity: Estimated body mass index is 30.94 kg/m  as calculated from the following:    Height as of an earlier encounter on 1/6/23: 1.702 m (5' 7\").    Weight as of this encounter: 89.6 kg (197 lb 8.5 oz).    Cardiovascular: Cardiac arrest       Disposition Plan   Expected discharge: 2 - 3 days, recommended to prior living arrangement once safe disposition plan/ TCU bed available.    Entered: Corbin Norwood MD 01/07/2023, 12:14 AM       Corbin Norwood MD  [facility]    ______________________________________________________________________    Chief Complaint   Chest pain found to have STEMI    History is obtained from the patient    History of Present Illness   Jordan Fisher is a 72 year old male who has a history of hypertension, hyperlipidemia.    Presented earlier this evening to an outside emergency department with right parasternal chest pain which she described as sharp, nonradiating.  He notes that the symptoms occurred 1-1/2 hours prior to him presenting to the outside emergency department.  The symptoms occurred when he was at rest, and were associated with nausea and dyspnea.     On arrival to the outside emergency department, his initial labs revealed that he had normal CMP including renal function, normal LFTs, normal CBC.  His initial EKG demonstrated ST elevation in the anterior leads.  As such outside hospital activated STEMI and patient was emergently transferred to [facility] cardiac catheterization " laboratory for diagnostic coronary angiogram with possible PCI.  Prior to transfer he was given morphine, nitroglycerin, aspirin 325, and Brilinta 180 mg.    In route, the patient suffered from a cardiac arrest (ventricular fibrillation).  CPR was initiated immediately and the patient was shocked.  After 10 minutes of CPR and shock, ROSC was achieved and the patient had a mental status.  He did not require intubation or mechanical circulatory support.    In the cardiac catheterization laboratory, he was noted to have significant proximal to mid LAD stenosis which was successfully stented.  He is now admitted to the CVICU for routine post NSTEMI care and monitoring as well as guideline directed therapy and optimization prior to discharge.    Past Medical History    No past medical history on file.    Past Surgical History   Past Surgical History:   Procedure Laterality Date     CHOLECYSTECTOMY         Prior to admission medications  Amlodipine 5 mg  Aspirin 81 mg daily  Hydrochlorothiazide 12.5 mg daily  Meclizine 25 mg daily  Sildenafil 50 mg as needed    Physical Exam   Vital Signs:   Temp src: Oral BP: 121/66 Pulse: 59   Resp: 20 SpO2: 96 % O2 Device: Oxymask Oxygen Delivery: 7 LPM  Weight: 197 lbs 8.51 oz    General Appearance: No acute distress  Eyes: No sclera icterus  HEENT: JVP 6  Respiratory: Clear to auscultation bilaterally, no acute distress on room air  Cardiovascular: Regular rate and rhythm no murmur rubs or gallops  GI: Soft nontender nondistended  Skin: No rashes or bruises  Musculoskeletal: Normal  Neurologic: Cranial nerves II through XII grossly intact  Psychiatric: Appropriate mood and affect    Medical Decision Making       Data     I have personally reviewed the following data over the past 24 hrs:    8.6  \   13.4   / 155     137 103 18.8 /  190 (H)   3.5 22 0.86 \       ALT: 19 AST: 20 AP: 89 TBILI: 0.9   ALB: 4.0 TOT PROTEIN: 7.5 LIPASE: N/A       Trop: 13 BNP: N/A       INR:  1.15 PTT:  28    D-dimer:  N/A Fibrinogen:  N/A

## 2023-01-07 NOTE — PLAN OF CARE
ICU End of Shift Summary. See flowsheets for vital signs and detailed assessment.    Changes this shift: Pt A&O4, following commands, PERRLA. No complaints of chest pain, sore from previous CPR. HR sinus moinca with 1st degree AV block. Afebrile. Normotensive. On 2L oxymask. PRN compazine given for nausea and dry heaving. Able to use urinal to void. No BM this shift. Critical Troponin of 1635.    Plan: Continue assessing sheath site. Continue plan of care.      Problem: Plan of Care - These are the overarching goals to be used throughout the patient stay.    Goal: Absence of Hospital-Acquired Illness or Injury  Outcome: Progressing  Intervention: Identify and Manage Fall Risk  Recent Flowsheet Documentation  Taken 1/7/2023 0400 by Luciana Bhatia RN  Safety Promotion/Fall Prevention:    clutter free environment maintained    fall prevention program maintained    increased rounding and observation    increase visualization of patient    patient and family education    room door open    room near nurse's station    room organization consistent    safety round/check completed  Taken 1/7/2023 0000 by Luciana Bhatia RN  Safety Promotion/Fall Prevention:    clutter free environment maintained    fall prevention program maintained    increased rounding and observation    increase visualization of patient    patient and family education    room door open    room near nurse's station    room organization consistent    safety round/check completed  Intervention: Prevent Skin Injury  Recent Flowsheet Documentation  Taken 1/7/2023 0400 by Luciana Bhatia RN  Body Position:    supine    weight shifting  Taken 1/7/2023 0000 by Luciana Bhatia RN  Body Position: supine  Taken 1/6/2023 2245 by Luciana Bhatia RN  Body Position: supine  Intervention: Prevent and Manage VTE (Venous Thromboembolism) Risk  Recent Flowsheet Documentation  Taken 1/7/2023 0400 by Luciana Bhatia RN  VTE Prevention/Management:  SCDs (sequential compression devices) off  Taken 1/7/2023 0000 by Luciana Bhatia RN  VTE Prevention/Management: SCDs (sequential compression devices) off  Intervention: Prevent Infection  Recent Flowsheet Documentation  Taken 1/7/2023 0400 by Luciana Bhatia RN  Infection Prevention:    environmental surveillance performed    equipment surfaces disinfected    hand hygiene promoted    personal protective equipment utilized    rest/sleep promoted    single patient room provided  Taken 1/7/2023 0000 by Luciana Bhatia RN  Infection Prevention:    environmental surveillance performed    equipment surfaces disinfected    hand hygiene promoted    personal protective equipment utilized    rest/sleep promoted    single patient room provided     Problem: Risk for Delirium  Goal: Improved Behavioral Control  Outcome: Progressing  Intervention: Minimize Safety Risk  Recent Flowsheet Documentation  Taken 1/7/2023 0400 by Luciana Bhatia RN  Trust Relationship/Rapport:    care explained    choices provided    emotional support provided    empathic listening provided    questions answered    questions encouraged    reassurance provided    thoughts/feelings acknowledged  Taken 1/7/2023 0000 by Luciana Bhatia RN  Trust Relationship/Rapport:    care explained    choices provided    emotional support provided    empathic listening provided    questions answered    questions encouraged    reassurance provided    thoughts/feelings acknowledged     Problem: Risk for Delirium  Goal: Improved Attention and Thought Clarity  Outcome: Progressing     Problem: Risk for Delirium  Goal: Improved Sleep  Outcome: Progressing

## 2023-01-07 NOTE — PROGRESS NOTES
CLINICAL NUTRITION SERVICES    Reason for Assessment:  Heart-healthy nutrition education, received consult.    Diet History:  Pt reports no history of receiving heart-healthy nutrition education in the past. Pt reports usual intake of fish (broiled and seasoned with salt), vegetables, fruit, occasional burger or steak. Pt recently started using coconut oil to cook with.     Nutrition Diagnosis:  Food- and nutrition-related knowledge deficit r/t no previous knowledge of heart-healthy diet AEB pt report of no previous formal heart-healthy nutrition education.    Nutrition Prescription/Recs:  Continue heart-healthy diet.      Interventions:  Nutrition Education  1. Provided verbal instruction on a heart-healthy diet, emphasizing seasoning food without salt or using Mrs. Webb seasonings and heart healthy vegetable oils.  2. Provided handouts: Heart Health Guidelines (includes Heart Healthy Food Choices), How to Read Nutrition Labels, and Seasoning Your Foods Without Adding Salt from the Nutrition Care Manual.    Goals:    1. Pt will verbalize at least four foods high in saturated or trans-fats and five foods high in sodium.    2. Pt will list at least two interventions to make current meal plan more heart-healthy.     Follow-up:   Patient to ask any further nutrition-related questions before discharge. In addition, pt may request outpatient RD appointment.    Dennise Soler, MS, RD, LD, [facility] pager: [phone]  ASCOM: [id number]  Weekend Pager: [phone]

## 2023-01-07 NOTE — PHARMACY-ADMISSION MEDICATION HISTORY
Admission Medication History Completed by Pharmacy    See [facility] Admission Navigator for allergy information, preferred outpatient pharmacy, prior to admission medications and immunization status.     Medication History Sources:     EPIC only, office visit 12/15/22 ([facility]); RX fill history     Changes made to PTA medication list (reason):    Added: atorvastatin, famotidine, losartan, omeprazole, sucalfate, tamsulosin    Deleted: amlodipine, HCTZ    Changed: None    Additional Information:    Unable to confirm if taking ASA, multivitamin, and sildenafil - no RX fills in our system    Prior to Admission medications    Medication Sig Last Dose Taking? Auth Provider Long Term End Date   atorvastatin (LIPITOR) 40 MG tablet Take 40 mg by mouth daily  Yes Unknown, Entered By History     famotidine (PEPCID) 20 MG tablet Take 20 mg by mouth 2 times daily  Yes Unknown, Entered By History     losartan (COZAAR) 100 MG tablet Take 100 mg by mouth daily  Yes Unknown, Entered By History     omeprazole (PRILOSEC) 40 MG DR capsule Take 40 mg by mouth daily  Yes Unknown, Entered By History     sucralfate (CARAFATE) 1 GM tablet Take 1 g by mouth 4 times daily  Yes Unknown, Entered By History     tamsulosin (FLOMAX) 0.4 MG capsule Take 0.4 mg by mouth daily  Yes Unknown, Entered By History     aspirin 81 mg chewable tablet [ASPIRIN 81 MG CHEWABLE TABLET] Chew 81 mg.   Provider, Historical     multivitamin with minerals tablet [MULTIVITAMIN WITH MINERALS TABLET] daily.   Provider, Historical     sildenafil (VIAGRA) 50 MG tablet [SILDENAFIL (VIAGRA) 50 MG TABLET] Take 1 tab po 1hr prior to sexual activity   Provider, Historical         Date completed: 01/07/23    Medication history completed by: Rita Becerril [facility]

## 2023-01-07 NOTE — PROGRESS NOTES
Admitted/transferred from: Cath lab  Reason for admission/transfer: post cath lab, stents placed  Patient status upon admission/transfer: A&Ox4, following commands VSS, R venous sheath in place.   Interventions: skin check completed, labs sent  Plan: continue bedrest, sheath to be removed in a couple of hours  2 RN skin assessment: completed by Breanna HAMPTON and Luciana HAMPTON  Result of skin assessment and interventions/actions: no adjustments needed  Height, weight, drug calc weight: Done  Patient belongings (see Flowsheet - Adult Profile for details): cell phone, glasses and clothes with pt  MDRO education (if applicable):

## 2023-01-08 ENCOUNTER — APPOINTMENT (OUTPATIENT)
Dept: CARDIOLOGY | Facility: CLINIC | Age: 73
DRG: 246 | End: 2023-01-08
Attending: STUDENT IN AN ORGANIZED HEALTH CARE EDUCATION/TRAINING PROGRAM
Payer: COMMERCIAL

## 2023-01-08 ENCOUNTER — APPOINTMENT (OUTPATIENT)
Dept: GENERAL RADIOLOGY | Facility: CLINIC | Age: 73
DRG: 246 | End: 2023-01-08
Attending: STUDENT IN AN ORGANIZED HEALTH CARE EDUCATION/TRAINING PROGRAM
Payer: COMMERCIAL

## 2023-01-08 LAB
ANION GAP SERPL CALCULATED.3IONS-SCNC: 12 MMOL/L (ref 7–15)
ATRIAL RATE - MUSE: 63 BPM
ATRIAL RATE - MUSE: 65 BPM
ATRIAL RATE - MUSE: 66 BPM
BASOPHILS # BLD AUTO: 0 10E3/UL (ref 0–0.2)
BASOPHILS NFR BLD AUTO: 0 %
BUN SERPL-MCNC: 20.3 MG/DL (ref 8–23)
CA-I BLD-MCNC: 4.4 MG/DL (ref 4.4–5.2)
CALCIUM SERPL-MCNC: 8.1 MG/DL (ref 8.8–10.2)
CHLORIDE SERPL-SCNC: 107 MMOL/L (ref 98–107)
CREAT SERPL-MCNC: 0.85 MG/DL (ref 0.67–1.17)
DEPRECATED HCO3 PLAS-SCNC: 18 MMOL/L (ref 22–29)
DIASTOLIC BLOOD PRESSURE - MUSE: NORMAL MMHG
EOSINOPHIL # BLD AUTO: 0.1 10E3/UL (ref 0–0.7)
EOSINOPHIL NFR BLD AUTO: 1 %
ERYTHROCYTE [DISTWIDTH] IN BLOOD BY AUTOMATED COUNT: 13.4 % (ref 10–15)
GFR SERPL CREATININE-BSD FRML MDRD: >90 ML/MIN/1.73M2
GLUCOSE SERPL-MCNC: 114 MG/DL (ref 70–99)
HCT VFR BLD AUTO: 38.1 % (ref 40–53)
HGB BLD-MCNC: 12.6 G/DL (ref 13.3–17.7)
IMM GRANULOCYTES # BLD: 0 10E3/UL
IMM GRANULOCYTES NFR BLD: 1 %
INTERPRETATION ECG - MUSE: NORMAL
LVEF ECHO: NORMAL
LYMPHOCYTES # BLD AUTO: 0.8 10E3/UL (ref 0.8–5.3)
LYMPHOCYTES NFR BLD AUTO: 10 %
MAGNESIUM SERPL-MCNC: 2 MG/DL (ref 1.7–2.3)
MCH RBC QN AUTO: 30.9 PG (ref 26.5–33)
MCHC RBC AUTO-ENTMCNC: 33.1 G/DL (ref 31.5–36.5)
MCV RBC AUTO: 93 FL (ref 78–100)
MONOCYTES # BLD AUTO: 0.7 10E3/UL (ref 0–1.3)
MONOCYTES NFR BLD AUTO: 8 %
NEUTROPHILS # BLD AUTO: 6.6 10E3/UL (ref 1.6–8.3)
NEUTROPHILS NFR BLD AUTO: 80 %
NRBC # BLD AUTO: 0 10E3/UL
NRBC BLD AUTO-RTO: 0 /100
P AXIS - MUSE: 31 DEGREES
P AXIS - MUSE: 31 DEGREES
P AXIS - MUSE: 40 DEGREES
PHOSPHATE SERPL-MCNC: 2.2 MG/DL (ref 2.5–4.5)
PLATELET # BLD AUTO: 173 10E3/UL (ref 150–450)
POTASSIUM SERPL-SCNC: 3.9 MMOL/L (ref 3.4–5.3)
PR INTERVAL - MUSE: 230 MS
PR INTERVAL - MUSE: 238 MS
PR INTERVAL - MUSE: 238 MS
QRS DURATION - MUSE: 92 MS
QRS DURATION - MUSE: 94 MS
QRS DURATION - MUSE: 94 MS
QT - MUSE: 480 MS
QT - MUSE: 484 MS
QT - MUSE: 486 MS
QTC - MUSE: 495 MS
QTC - MUSE: 503 MS
QTC - MUSE: 505 MS
R AXIS - MUSE: -16 DEGREES
R AXIS - MUSE: -18 DEGREES
R AXIS - MUSE: -20 DEGREES
RBC # BLD AUTO: 4.08 10E6/UL (ref 4.4–5.9)
SODIUM SERPL-SCNC: 137 MMOL/L (ref 136–145)
SYSTOLIC BLOOD PRESSURE - MUSE: NORMAL MMHG
T AXIS - MUSE: 39 DEGREES
T AXIS - MUSE: 52 DEGREES
T AXIS - MUSE: 58 DEGREES
TROPONIN T SERPL HS-MCNC: 1792 NG/L
TROPONIN T SERPL HS-MCNC: 2545 NG/L
TROPONIN T SERPL HS-MCNC: 2745 NG/L
VENTRICULAR RATE- MUSE: 63 BPM
VENTRICULAR RATE- MUSE: 65 BPM
VENTRICULAR RATE- MUSE: 66 BPM
WBC # BLD AUTO: 8.3 10E3/UL (ref 4–11)

## 2023-01-08 PROCEDURE — 99233 SBSQ HOSP IP/OBS HIGH 50: CPT | Mod: 25 | Performed by: INTERNAL MEDICINE

## 2023-01-08 PROCEDURE — 93010 ELECTROCARDIOGRAM REPORT: CPT | Mod: 76 | Performed by: INTERNAL MEDICINE

## 2023-01-08 PROCEDURE — 84484 ASSAY OF TROPONIN QUANT: CPT | Performed by: STUDENT IN AN ORGANIZED HEALTH CARE EDUCATION/TRAINING PROGRAM

## 2023-01-08 PROCEDURE — 250N000013 HC RX MED GY IP 250 OP 250 PS 637: Performed by: INTERNAL MEDICINE

## 2023-01-08 PROCEDURE — 84100 ASSAY OF PHOSPHORUS: CPT | Performed by: STUDENT IN AN ORGANIZED HEALTH CARE EDUCATION/TRAINING PROGRAM

## 2023-01-08 PROCEDURE — 250N000011 HC RX IP 250 OP 636: Performed by: STUDENT IN AN ORGANIZED HEALTH CARE EDUCATION/TRAINING PROGRAM

## 2023-01-08 PROCEDURE — 36415 COLL VENOUS BLD VENIPUNCTURE: CPT | Performed by: STUDENT IN AN ORGANIZED HEALTH CARE EDUCATION/TRAINING PROGRAM

## 2023-01-08 PROCEDURE — 85025 COMPLETE CBC W/AUTO DIFF WBC: CPT | Performed by: STUDENT IN AN ORGANIZED HEALTH CARE EDUCATION/TRAINING PROGRAM

## 2023-01-08 PROCEDURE — 250N000013 HC RX MED GY IP 250 OP 250 PS 637: Performed by: STUDENT IN AN ORGANIZED HEALTH CARE EDUCATION/TRAINING PROGRAM

## 2023-01-08 PROCEDURE — 93010 ELECTROCARDIOGRAM REPORT: CPT | Performed by: INTERNAL MEDICINE

## 2023-01-08 PROCEDURE — 83735 ASSAY OF MAGNESIUM: CPT | Performed by: STUDENT IN AN ORGANIZED HEALTH CARE EDUCATION/TRAINING PROGRAM

## 2023-01-08 PROCEDURE — C8924 2D TTE W OR W/O FOL W/CON,FU: HCPCS

## 2023-01-08 PROCEDURE — 93325 DOPPLER ECHO COLOR FLOW MAPG: CPT | Mod: 26 | Performed by: STUDENT IN AN ORGANIZED HEALTH CARE EDUCATION/TRAINING PROGRAM

## 2023-01-08 PROCEDURE — 999N000208 ECHOCARDIOGRAM LIMITED

## 2023-01-08 PROCEDURE — 93321 DOPPLER ECHO F-UP/LMTD STD: CPT | Mod: 26 | Performed by: STUDENT IN AN ORGANIZED HEALTH CARE EDUCATION/TRAINING PROGRAM

## 2023-01-08 PROCEDURE — 255N000002 HC RX 255 OP 636: Performed by: STUDENT IN AN ORGANIZED HEALTH CARE EDUCATION/TRAINING PROGRAM

## 2023-01-08 PROCEDURE — 200N000002 HC R&B ICU UMMC

## 2023-01-08 PROCEDURE — 82330 ASSAY OF CALCIUM: CPT | Performed by: STUDENT IN AN ORGANIZED HEALTH CARE EDUCATION/TRAINING PROGRAM

## 2023-01-08 PROCEDURE — 80048 BASIC METABOLIC PNL TOTAL CA: CPT | Performed by: STUDENT IN AN ORGANIZED HEALTH CARE EDUCATION/TRAINING PROGRAM

## 2023-01-08 PROCEDURE — 93308 TTE F-UP OR LMTD: CPT | Mod: 26 | Performed by: STUDENT IN AN ORGANIZED HEALTH CARE EDUCATION/TRAINING PROGRAM

## 2023-01-08 PROCEDURE — 93005 ELECTROCARDIOGRAM TRACING: CPT

## 2023-01-08 PROCEDURE — 71045 X-RAY EXAM CHEST 1 VIEW: CPT

## 2023-01-08 PROCEDURE — 71045 X-RAY EXAM CHEST 1 VIEW: CPT | Mod: 26 | Performed by: RADIOLOGY

## 2023-01-08 RX ORDER — PRASUGREL 10 MG/1
10 TABLET, FILM COATED ORAL DAILY
Status: DISCONTINUED | OUTPATIENT
Start: 2023-01-09 | End: 2023-01-12 | Stop reason: HOSPADM

## 2023-01-08 RX ORDER — PRASUGREL 10 MG/1
60 TABLET, FILM COATED ORAL ONCE
Status: COMPLETED | OUTPATIENT
Start: 2023-01-08 | End: 2023-01-08

## 2023-01-08 RX ORDER — FUROSEMIDE 10 MG/ML
20 INJECTION INTRAMUSCULAR; INTRAVENOUS ONCE
Status: COMPLETED | OUTPATIENT
Start: 2023-01-08 | End: 2023-01-08

## 2023-01-08 RX ORDER — ACETAMINOPHEN 325 MG/1
650 TABLET ORAL EVERY 4 HOURS PRN
Status: DISCONTINUED | OUTPATIENT
Start: 2023-01-08 | End: 2023-01-12 | Stop reason: HOSPADM

## 2023-01-08 RX ORDER — KETOROLAC TROMETHAMINE 15 MG/ML
15 INJECTION, SOLUTION INTRAMUSCULAR; INTRAVENOUS
Status: DISCONTINUED | OUTPATIENT
Start: 2023-01-08 | End: 2023-01-11

## 2023-01-08 RX ADMIN — SENNOSIDES AND DOCUSATE SODIUM 1 TABLET: 50; 8.6 TABLET ORAL at 20:14

## 2023-01-08 RX ADMIN — ACETAMINOPHEN 650 MG: 325 TABLET, FILM COATED ORAL at 16:51

## 2023-01-08 RX ADMIN — ASPIRIN 81 MG: 81 TABLET, CHEWABLE ORAL at 09:14

## 2023-01-08 RX ADMIN — PRASUGREL 60 MG: 10 TABLET, FILM COATED ORAL at 18:08

## 2023-01-08 RX ADMIN — ACETAMINOPHEN 650 MG: 325 TABLET, FILM COATED ORAL at 09:14

## 2023-01-08 RX ADMIN — ACETAMINOPHEN 650 MG: 325 TABLET, FILM COATED ORAL at 22:08

## 2023-01-08 RX ADMIN — ATORVASTATIN CALCIUM 80 MG: 80 TABLET, FILM COATED ORAL at 09:14

## 2023-01-08 RX ADMIN — Medication 1 PACKET: at 09:14

## 2023-01-08 RX ADMIN — LOSARTAN POTASSIUM 25 MG: 25 TABLET, FILM COATED ORAL at 09:14

## 2023-01-08 RX ADMIN — ENOXAPARIN SODIUM 40 MG: 40 INJECTION SUBCUTANEOUS at 09:14

## 2023-01-08 RX ADMIN — HUMAN ALBUMIN MICROSPHERES AND PERFLUTREN 6 ML: 10; .22 INJECTION, SOLUTION INTRAVENOUS at 08:19

## 2023-01-08 RX ADMIN — ACETAMINOPHEN 650 MG: 325 TABLET, FILM COATED ORAL at 00:40

## 2023-01-08 RX ADMIN — TICAGRELOR 90 MG: 90 TABLET ORAL at 09:14

## 2023-01-08 RX ADMIN — Medication 1 PACKET: at 15:32

## 2023-01-08 RX ADMIN — FUROSEMIDE 20 MG: 10 INJECTION, SOLUTION INTRAVENOUS at 15:18

## 2023-01-08 RX ADMIN — POLYETHYLENE GLYCOL 3350 17 G: 17 POWDER, FOR SOLUTION ORAL at 09:15

## 2023-01-08 RX ADMIN — LIDOCAINE PATCH 4% 1 PATCH: 40 PATCH TOPICAL at 09:15

## 2023-01-08 RX ADMIN — SENNOSIDES AND DOCUSATE SODIUM 2 TABLET: 50; 8.6 TABLET ORAL at 14:13

## 2023-01-08 RX ADMIN — Medication 1 PACKET: at 12:37

## 2023-01-08 RX ADMIN — Medication 12.5 MG: at 09:20

## 2023-01-08 ASSESSMENT — ACTIVITIES OF DAILY LIVING (ADL)
ADLS_ACUITY_SCORE: 28

## 2023-01-08 NOTE — PLAN OF CARE
"  Problem: Plan of Care - These are the overarching goals to be used throughout the patient stay.    Goal: Plan of Care Review  Description: The Plan of Care Review/Shift note should be completed every shift.  The Outcome Evaluation is a brief statement about your assessment that the patient is improving, declining, or no change.  This information will be displayed automatically on your shift note.  Outcome: Progressing  Goal: Patient-Specific Goal (Individualized)  Description: You can add care plan individualizations to a care plan. Examples of Individualization might be:  \"Parent requests to be called daily at 9am for status\", \"I have a hard time hearing out of my right ear\", or \"Do not touch me to wake me up as it startles me\".  Outcome: Progressing  Goal: Absence of Hospital-Acquired Illness or Injury  Outcome: Progressing  Intervention: Identify and Manage Fall Risk  Recent Flowsheet Documentation  Taken 1/8/2023 1600 by Lynette Pierson RN  Safety Promotion/Fall Prevention:    activity supervised    clutter free environment maintained    fall prevention program maintained    increased rounding and observation    increase visualization of patient    lighting adjusted    nonskid shoes/slippers when out of bed    patient and family education    room door open    room near nurse's station    room organization consistent    safety round/check completed    supervised activity    treat reversible contributory factors    treat underlying cause  Taken 1/8/2023 1200 by Lynette Pierson RN  Safety Promotion/Fall Prevention:    activity supervised    clutter free environment maintained    fall prevention program maintained    increased rounding and observation    increase visualization of patient    lighting adjusted    nonskid shoes/slippers when out of bed    patient and family education    room door open    room near nurse's station    room organization consistent    safety round/check completed    " supervised activity    treat reversible contributory factors    treat underlying cause  Taken 1/8/2023 0800 by Lynette Pierson RN  Safety Promotion/Fall Prevention:    activity supervised    clutter free environment maintained    fall prevention program maintained    increased rounding and observation    increase visualization of patient    lighting adjusted    nonskid shoes/slippers when out of bed    patient and family education    room door open    room near nurse's station    room organization consistent    safety round/check completed    supervised activity    treat reversible contributory factors    treat underlying cause  Intervention: Prevent Skin Injury  Recent Flowsheet Documentation  Taken 1/8/2023 1800 by Lynette Pierson RN  Body Position: position changed independently  Taken 1/8/2023 1700 by Lynette Pierson RN  Body Position: position changed independently  Taken 1/8/2023 1600 by Lynette Pierson RN  Body Position: position changed independently  Taken 1/8/2023 1500 by Lynette Pierson RN  Body Position: position changed independently  Taken 1/8/2023 1400 by Lynette Pierson RN  Body Position: position changed independently  Taken 1/8/2023 1300 by Lynette Pierson RN  Body Position: position changed independently  Taken 1/8/2023 1200 by Lynette Pierson RN  Body Position: position changed independently  Taken 1/8/2023 1000 by Lynette Pierson RN  Body Position: position changed independently  Taken 1/8/2023 0800 by Lynette Pierson RN  Body Position: position changed independently  Intervention: Prevent and Manage VTE (Venous Thromboembolism) Risk  Recent Flowsheet Documentation  Taken 1/8/2023 1600 by Lynette Pierson RN  VTE Prevention/Management: SCDs (sequential compression devices) off  Taken 1/8/2023 1200 by Lynette Pierson RN  VTE Prevention/Management: SCDs (sequential compression devices) off  Taken  1/8/2023 0800 by Lynette Pierson RN  VTE Prevention/Management: SCDs (sequential compression devices) off  Intervention: Prevent Infection  Recent Flowsheet Documentation  Taken 1/8/2023 1600 by Lynette Pierson RN  Infection Prevention:    environmental surveillance performed    equipment surfaces disinfected    hand hygiene promoted    personal protective equipment utilized    rest/sleep promoted    single patient room provided    visitors restricted/screened  Taken 1/8/2023 1200 by Lynette Pierson RN  Infection Prevention:    environmental surveillance performed    equipment surfaces disinfected    hand hygiene promoted    personal protective equipment utilized    rest/sleep promoted    single patient room provided    visitors restricted/screened  Taken 1/8/2023 0800 by Lynette Pierson RN  Infection Prevention:    environmental surveillance performed    equipment surfaces disinfected    hand hygiene promoted    personal protective equipment utilized    rest/sleep promoted    single patient room provided    visitors restricted/screened  Goal: Optimal Comfort and Wellbeing  Outcome: Progressing  Intervention: Provide Person-Centered Care  Recent Flowsheet Documentation  Taken 1/8/2023 1600 by Lynette Pierson RN  Trust Relationship/Rapport:    care explained    choices provided    emotional support provided    empathic listening provided    questions answered    questions encouraged    reassurance provided    thoughts/feelings acknowledged  Taken 1/8/2023 1200 by Lynette Pierson RN  Trust Relationship/Rapport:    care explained    choices provided    emotional support provided    empathic listening provided    questions answered    questions encouraged    reassurance provided    thoughts/feelings acknowledged  Taken 1/8/2023 0800 by Lynette Pierson RN  Trust Relationship/Rapport:    care explained    choices provided    emotional support provided    empathic  "listening provided    questions answered    questions encouraged    reassurance provided    thoughts/feelings acknowledged  Goal: Readiness for Transition of Care  Outcome: Progressing   Goal Outcome Evaluation:       ICU End of Shift Summary. See flowsheets for vital signs and detailed assessment.    Changes this shift:   Patient hemodynamically stable today. Oxygen 4L sats in the low 90s; desating to 88% on R.A. MD notified.  Patient experiencing SOB and FELIPE. 12 lead x3, Echo, and Chest x-ray done without dramatic changes from the previous day. Furosemide 20mg given with \"moderate\" difference in SOB per patient. CSI team changed blood thinner in hopes of improving symptoms.     Plan:    Transfer orders to the floor.                "

## 2023-01-08 NOTE — PROGRESS NOTES
ICU End of Shift Summary. See flowsheets for vital signs and detailed assessment.    Changes this shift: Pt alert/oriented x4. Continues to have aching 5/10 residual pain from surgery (managed with prn tylenol), generalized weakness/myalgias and ongoing SOB with exertion. Overnight, pt reported having worsening dyspnea while satting > 92% on 2L NC. Described as not being able to catch his breathe, CSI resident Maury notified. Subsequent EKGs and troponins (>2500 x2) ordered. Dyspnea improved but continuing to monitor for worsening symptoms. Phos 2.2, not replaced per MD orders.    Plan: Echo today, manage pain/SOB

## 2023-01-08 NOTE — PROGRESS NOTES
[facility]    Cardiology Progress Note- ICU     Assessment and Plan:     72 year old male who has a history of hypertension, hyperlipidemia who presents as an outside transfer for chest pain found to have anterior ST elevation.  His course has been complicated by ventricular fibrillation and cardiac arrest status post defibrillation with ROSC.  Coronary angiogram revealed obstructive coronary artery disease in the proximal to mid LAD now status post successful revascularization who was admitted to the ICU for post coronary angiogram and intervention cares after his STEMI.    Interval Events:  Dyspnea at rest and with exertion overnight. Troponin down-trending, and EKG evolving as expected post-PCI. No indications for cath lab.    Plan today:  - CXR: consider low dose diuretic if residual edema s/p STEMI  - Consider switching Ticagrelor to Clopidogrel given dyspnea if trops/EKGs reassuring  - Floor transfer pending bed availability     ==================================  # STEMI s/p PCI with SARITHA to mLAD   Found to have 99% thrombotic lesion in prox-to-mid LAD s/p successful PCI with SARITHA x 1. A1C and lipid panel unremarkable.   - Continue daily aspirin 81 mg and ticagrelor 90 mg twice daily. Plan for 1 year of DAPT, followed by ASA monotherapy.    > Persistent dyspnea with reassuring EKG/Troponin, concerning for Ticagrelor  ADR, plan to switch to Clopidogrel if PM repeats unremarkable  - Continue atorvastatin 80 mg daily  - Started on metoprolol succinate 12.5 mg daily and losartan 25 mg daily for guideline directed medical therapy post STEMI  - Continue telemetry and O2 monitoring     #Heart Failure with mildly reduced ejection fraction  In s/o STEMI, LVEF 45-50% with anterior wall motion abnormalities consistent with LAD STEMI. Revascularized 01/07.   - Volume: Euvolemic on examination, defer scheduled diuretic   - GDMT   > Beta Blocker: Metoprolol Succinate 12.5mg    >  ACE/ARB/ARNI: Losartan 25mg daily   > MRA: not indicated   > SGLT2i: Consider prior to discharge or at follow up  - Therapies   > ICD not indicated   > CRT not indicated   - Management    > Follow up TTE for LVEF recovery after discharge     # Vfib arrest with successful ROSC  This was likely in the setting of his acute coronary event. Started beta-blockade with metoprolol 12.5 mg as above.   - Continue cardiac telemetry for now.   - No indication for ICD post-cardiac arrest since patient's arrest was likely due to ischemia and he has been revascularized.     # Hyperlipidemia  # Coronary artery disease  - Continue high intensity statin therapy with Atorvastatin 80 mg daily      # Hypertension  - Metoprolol and losartan as above     # Acute Hypoxic Respiratory Failure  2L oxygen requirement overnight, on RA this morning. Suspect mild pulmonary edema s/p STEMI vs atelectasis.   - Wean as able  - Mobilize as able  - Consider furosemide as above    # Anemia  Mild to 12.6 this AM, suspect from procedural losses and heparin. No hemodynamic changes and no evidence of rapid bleeding.   - Trend CBC    FEN: Regular, Low sodium  PPx: Lovenox  Code: Full code     Dispo: Floor status, pending bed    This patient's care was discussed with Dr Oh Dillon, attending cardiologist, who agrees with the assessment and plan unless otherwise indicated.    Mio Lindsey MD MPP, PGY-4  Fellow, Cardiovascular Disease     Objective:   BP (!) 155/91   Pulse 71   Temp (!) 95.8  F (35.4  C) (Axillary)   Resp 24   Wt 88 kg (194 lb)   SpO2 96%   BMI 30.38 kg/m    Temp (24hrs), Av.5  F (36.9  C), Min:97.8  F (36.6  C), Max:98.9  F (37.2  C)    Physical Exam   GEN: No acute distress   HEENT: EOMI, no icterus, moist mucous membranes   CV: RRR, normal s1/s2, no murmurs. JVP not elevated   CHEST: Normal work of breathing. Lungs CTAB, no wheezes or crackles. Chest wall tender to palpation   ABD: Soft, NT/ND   EXT: Warm and well-perfused,  No LE edema  NEURO: Awake, alert, answering questions appropriately, motor grossly nonfocal  SKIN: No worrisome lesions  PSYCH: cooperative, affect appropriate    DATA:   Labs, EKGs and imaging reviewed. Pertinent results discussed in assessment and plan above.

## 2023-01-09 ENCOUNTER — APPOINTMENT (OUTPATIENT)
Dept: OCCUPATIONAL THERAPY | Facility: CLINIC | Age: 73
DRG: 246 | End: 2023-01-09
Payer: COMMERCIAL

## 2023-01-09 LAB
ALBUMIN SERPL BCG-MCNC: 3.7 G/DL (ref 3.5–5.2)
ALBUMIN SERPL BCG-MCNC: 3.8 G/DL (ref 3.5–5.2)
ALP SERPL-CCNC: 103 U/L (ref 40–129)
ALP SERPL-CCNC: 85 U/L (ref 40–129)
ALT SERPL W P-5'-P-CCNC: 59 U/L (ref 10–50)
ALT SERPL W P-5'-P-CCNC: 67 U/L (ref 10–50)
ANION GAP SERPL CALCULATED.3IONS-SCNC: 16 MMOL/L (ref 7–15)
AST SERPL W P-5'-P-CCNC: 71 U/L (ref 10–50)
AST SERPL W P-5'-P-CCNC: ABNORMAL U/L
ATRIAL RATE - MUSE: 56 BPM
ATRIAL RATE - MUSE: 65 BPM
BILIRUB DIRECT SERPL-MCNC: 0.42 MG/DL (ref 0–0.3)
BILIRUB SERPL-MCNC: 1.4 MG/DL
BILIRUB SERPL-MCNC: 1.5 MG/DL
BUN SERPL-MCNC: 19.7 MG/DL (ref 8–23)
CALCIUM SERPL-MCNC: 8.6 MG/DL (ref 8.8–10.2)
CHLORIDE SERPL-SCNC: 102 MMOL/L (ref 98–107)
CREAT SERPL-MCNC: 0.76 MG/DL (ref 0.67–1.17)
CREAT SERPL-MCNC: 0.78 MG/DL (ref 0.67–1.17)
DEPRECATED HCO3 PLAS-SCNC: 18 MMOL/L (ref 22–29)
DIASTOLIC BLOOD PRESSURE - MUSE: NORMAL MMHG
DIASTOLIC BLOOD PRESSURE - MUSE: NORMAL MMHG
ERYTHROCYTE [DISTWIDTH] IN BLOOD BY AUTOMATED COUNT: 13.6 % (ref 10–15)
GFR SERPL CREATININE-BSD FRML MDRD: >90 ML/MIN/1.73M2
GFR SERPL CREATININE-BSD FRML MDRD: >90 ML/MIN/1.73M2
GLUCOSE SERPL-MCNC: 104 MG/DL (ref 70–99)
HCT VFR BLD AUTO: 39.6 % (ref 40–53)
HGB BLD-MCNC: 13.2 G/DL (ref 13.3–17.7)
INTERPRETATION ECG - MUSE: NORMAL
INTERPRETATION ECG - MUSE: NORMAL
MAGNESIUM SERPL-MCNC: 2 MG/DL (ref 1.7–2.3)
MCH RBC QN AUTO: 30.9 PG (ref 26.5–33)
MCHC RBC AUTO-ENTMCNC: 33.3 G/DL (ref 31.5–36.5)
MCV RBC AUTO: 93 FL (ref 78–100)
P AXIS - MUSE: 11 DEGREES
P AXIS - MUSE: 37 DEGREES
PHOSPHATE SERPL-MCNC: 2.5 MG/DL (ref 2.5–4.5)
PLATELET # BLD AUTO: 160 10E3/UL (ref 150–450)
PLATELET # BLD AUTO: 160 10E3/UL (ref 150–450)
POTASSIUM SERPL-SCNC: 3.8 MMOL/L (ref 3.4–5.3)
POTASSIUM SERPL-SCNC: 3.8 MMOL/L (ref 3.4–5.3)
PR INTERVAL - MUSE: 220 MS
PR INTERVAL - MUSE: 260 MS
PROT SERPL-MCNC: 7.1 G/DL (ref 6.4–8.3)
PROT SERPL-MCNC: 7.1 G/DL (ref 6.4–8.3)
QRS DURATION - MUSE: 94 MS
QRS DURATION - MUSE: 96 MS
QT - MUSE: 474 MS
QT - MUSE: 474 MS
QTC - MUSE: 457 MS
QTC - MUSE: 492 MS
R AXIS - MUSE: -21 DEGREES
R AXIS - MUSE: -3 DEGREES
RBC # BLD AUTO: 4.27 10E6/UL (ref 4.4–5.9)
SODIUM SERPL-SCNC: 136 MMOL/L (ref 136–145)
SYSTOLIC BLOOD PRESSURE - MUSE: NORMAL MMHG
SYSTOLIC BLOOD PRESSURE - MUSE: NORMAL MMHG
T AXIS - MUSE: 19 DEGREES
T AXIS - MUSE: 30 DEGREES
VENTRICULAR RATE- MUSE: 56 BPM
VENTRICULAR RATE- MUSE: 65 BPM
WBC # BLD AUTO: 8 10E3/UL (ref 4–11)

## 2023-01-09 PROCEDURE — 250N000013 HC RX MED GY IP 250 OP 250 PS 637: Performed by: STUDENT IN AN ORGANIZED HEALTH CARE EDUCATION/TRAINING PROGRAM

## 2023-01-09 PROCEDURE — 83735 ASSAY OF MAGNESIUM: CPT | Performed by: INTERNAL MEDICINE

## 2023-01-09 PROCEDURE — 97129 THER IVNTJ 1ST 15 MIN: CPT | Mod: GO | Performed by: OCCUPATIONAL THERAPIST

## 2023-01-09 PROCEDURE — 84100 ASSAY OF PHOSPHORUS: CPT | Performed by: INTERNAL MEDICINE

## 2023-01-09 PROCEDURE — 99233 SBSQ HOSP IP/OBS HIGH 50: CPT | Mod: GC | Performed by: STUDENT IN AN ORGANIZED HEALTH CARE EDUCATION/TRAINING PROGRAM

## 2023-01-09 PROCEDURE — 82248 BILIRUBIN DIRECT: CPT | Performed by: STUDENT IN AN ORGANIZED HEALTH CARE EDUCATION/TRAINING PROGRAM

## 2023-01-09 PROCEDURE — 120N000002 HC R&B MED SURG/OB UMMC

## 2023-01-09 PROCEDURE — 97110 THERAPEUTIC EXERCISES: CPT | Mod: GO | Performed by: OCCUPATIONAL THERAPIST

## 2023-01-09 PROCEDURE — 84155 ASSAY OF PROTEIN SERUM: CPT | Performed by: STUDENT IN AN ORGANIZED HEALTH CARE EDUCATION/TRAINING PROGRAM

## 2023-01-09 PROCEDURE — 250N000013 HC RX MED GY IP 250 OP 250 PS 637: Performed by: INTERNAL MEDICINE

## 2023-01-09 PROCEDURE — 250N000011 HC RX IP 250 OP 636: Performed by: STUDENT IN AN ORGANIZED HEALTH CARE EDUCATION/TRAINING PROGRAM

## 2023-01-09 PROCEDURE — 97535 SELF CARE MNGMENT TRAINING: CPT | Mod: GO | Performed by: OCCUPATIONAL THERAPIST

## 2023-01-09 PROCEDURE — 85027 COMPLETE CBC AUTOMATED: CPT | Performed by: STUDENT IN AN ORGANIZED HEALTH CARE EDUCATION/TRAINING PROGRAM

## 2023-01-09 PROCEDURE — 80053 COMPREHEN METABOLIC PANEL: CPT | Performed by: STUDENT IN AN ORGANIZED HEALTH CARE EDUCATION/TRAINING PROGRAM

## 2023-01-09 PROCEDURE — 84132 ASSAY OF SERUM POTASSIUM: CPT | Performed by: STUDENT IN AN ORGANIZED HEALTH CARE EDUCATION/TRAINING PROGRAM

## 2023-01-09 PROCEDURE — 36415 COLL VENOUS BLD VENIPUNCTURE: CPT | Performed by: STUDENT IN AN ORGANIZED HEALTH CARE EDUCATION/TRAINING PROGRAM

## 2023-01-09 PROCEDURE — 84132 ASSAY OF SERUM POTASSIUM: CPT | Performed by: INTERNAL MEDICINE

## 2023-01-09 PROCEDURE — 85049 AUTOMATED PLATELET COUNT: CPT | Performed by: STUDENT IN AN ORGANIZED HEALTH CARE EDUCATION/TRAINING PROGRAM

## 2023-01-09 RX ORDER — BENZONATATE 100 MG/1
100 CAPSULE ORAL 3 TIMES DAILY PRN
Status: DISCONTINUED | OUTPATIENT
Start: 2023-01-09 | End: 2023-01-12 | Stop reason: HOSPADM

## 2023-01-09 RX ORDER — SUCRALFATE 1 G/1
1 TABLET ORAL
Status: DISCONTINUED | OUTPATIENT
Start: 2023-01-09 | End: 2023-01-12 | Stop reason: HOSPADM

## 2023-01-09 RX ORDER — GUAIFENESIN/DEXTROMETHORPHAN 100-10MG/5
10 SYRUP ORAL EVERY 4 HOURS PRN
Status: DISCONTINUED | OUTPATIENT
Start: 2023-01-09 | End: 2023-01-12 | Stop reason: HOSPADM

## 2023-01-09 RX ORDER — LOSARTAN POTASSIUM 50 MG/1
50 TABLET ORAL DAILY
Status: DISCONTINUED | OUTPATIENT
Start: 2023-01-09 | End: 2023-01-12 | Stop reason: HOSPADM

## 2023-01-09 RX ORDER — FUROSEMIDE 40 MG
40 TABLET ORAL DAILY
Status: DISCONTINUED | OUTPATIENT
Start: 2023-01-09 | End: 2023-01-12 | Stop reason: HOSPADM

## 2023-01-09 RX ORDER — PANTOPRAZOLE SODIUM 40 MG/1
40 TABLET, DELAYED RELEASE ORAL
Status: DISCONTINUED | OUTPATIENT
Start: 2023-01-09 | End: 2023-01-12 | Stop reason: HOSPADM

## 2023-01-09 RX ADMIN — Medication 12.5 MG: at 08:40

## 2023-01-09 RX ADMIN — SUCRALFATE 1 G: 1 TABLET ORAL at 21:58

## 2023-01-09 RX ADMIN — SUCRALFATE 1 G: 1 TABLET ORAL at 18:30

## 2023-01-09 RX ADMIN — GUAIFENESIN AND DEXTROMETHORPHAN 10 ML: 100; 10 SYRUP ORAL at 18:31

## 2023-01-09 RX ADMIN — ASPIRIN 81 MG: 81 TABLET, CHEWABLE ORAL at 08:38

## 2023-01-09 RX ADMIN — LIDOCAINE PATCH 4% 1 PATCH: 40 PATCH TOPICAL at 08:45

## 2023-01-09 RX ADMIN — ACETAMINOPHEN 650 MG: 325 TABLET, FILM COATED ORAL at 18:31

## 2023-01-09 RX ADMIN — ATORVASTATIN CALCIUM 80 MG: 80 TABLET, FILM COATED ORAL at 08:38

## 2023-01-09 RX ADMIN — ACETAMINOPHEN 650 MG: 325 TABLET, FILM COATED ORAL at 14:46

## 2023-01-09 RX ADMIN — ACETAMINOPHEN 650 MG: 325 TABLET, FILM COATED ORAL at 03:33

## 2023-01-09 RX ADMIN — ENOXAPARIN SODIUM 40 MG: 40 INJECTION SUBCUTANEOUS at 08:39

## 2023-01-09 RX ADMIN — SUCRALFATE 1 G: 1 TABLET ORAL at 14:24

## 2023-01-09 RX ADMIN — FUROSEMIDE 40 MG: 40 TABLET ORAL at 10:46

## 2023-01-09 RX ADMIN — BENZONATATE 100 MG: 100 CAPSULE ORAL at 11:34

## 2023-01-09 RX ADMIN — PANTOPRAZOLE SODIUM 40 MG: 40 TABLET, DELAYED RELEASE ORAL at 11:34

## 2023-01-09 RX ADMIN — LOSARTAN POTASSIUM 50 MG: 50 TABLET, FILM COATED ORAL at 08:38

## 2023-01-09 RX ADMIN — ACETAMINOPHEN 650 MG: 325 TABLET, FILM COATED ORAL at 10:45

## 2023-01-09 RX ADMIN — PRASUGREL 10 MG: 10 TABLET, FILM COATED ORAL at 08:40

## 2023-01-09 RX ADMIN — GUAIFENESIN AND DEXTROMETHORPHAN 10 ML: 100; 10 SYRUP ORAL at 12:49

## 2023-01-09 ASSESSMENT — ACTIVITIES OF DAILY LIVING (ADL)
ADLS_ACUITY_SCORE: 28
ADLS_ACUITY_SCORE: 22
ADLS_ACUITY_SCORE: 20
ADLS_ACUITY_SCORE: 28
ADLS_ACUITY_SCORE: 20
ADLS_ACUITY_SCORE: 28
ADLS_ACUITY_SCORE: 22
ADLS_ACUITY_SCORE: 22

## 2023-01-09 NOTE — PROGRESS NOTES
[facility]    Cardiology Progress Note- ICU     Assessment and Plan:     72 year old male who has a history of hypertension, hyperlipidemia who presents as an outside transfer for chest pain found to have anterior ST elevation.  His course has been complicated by ventricular fibrillation and cardiac arrest status post defibrillation with ROSC.  Coronary angiogram revealed obstructive coronary artery disease in the proximal to mid LAD now status post successful revascularization who was admitted to the ICU for post coronary angiogram and intervention cares after his STEMI.    Interval Events:  Dyspnea resolved after switching off Ticagrelor. No complaints this AM.     Plan today:  - Continue maintenance diuretic with 40mg Furosemide PO, long-term needs TBD  - Continue Prasugrel  - Increase Losartan 25 -> 50mg  - Floor transfer pending bed availability     ==================================  # STEMI s/p PCI with SARITHA to mLAD   Found to have 99% thrombotic lesion in prox-to-mid LAD s/p successful PCI with SARITHA x 1. A1C and lipid panel unremarkable.   - Continue daily aspirin 81 mg and prasugrel 10mg daily. Plan for 1 year of DAPT, followed by ASA monotherapy.    > Persistent dyspnea with Ticagrelor, resolved with switch to Prasugrel  - Continue atorvastatin 80 mg daily  - Continue metoprolol succinate 12.5 mg daily and increase losartan to 50 mg daily for guideline directed medical therapy post STEMI  - Continue telemetry and O2 monitoring     #Heart Failure with mildly reduced ejection fraction  #Ischemic Cardiomyopathy  In s/o STEMI, LVEF 40-45%% with anterior wall motion abnormalities consistent with LAD STEMI. Revascularized 01/07.   - Volume: Euvolemic on examination, 40mg Furosemide PO, goal net even today  - GDMT   > Beta Blocker: Metoprolol Succinate 12.5mg    > ACE/ARB/ARNI: Losartan 50mg daily   > MRA: Consider adding, particularly if mild hypokalemia with diuretic   >  SGLT2i: Consider prior to discharge or at follow up  - Therapies   > ICD not indicated   > CRT not indicated   - Management    > Follow up TTE for LVEF recovery after discharge     # Vfib arrest with successful ROSC  This was likely in the setting of his acute coronary event. Started beta-blockade with metoprolol 12.5 mg as above.   - Continue cardiac telemetry for now.   - No indication for ICD post-cardiac arrest since patient's arrest was likely due to ischemia and he has been revascularized.     # Hyperlipidemia  # Coronary artery disease  - Continue high intensity statin therapy with Atorvastatin 80 mg daily      # Hypertension  - Metoprolol and losartan as above     # Acute Hypoxic Respiratory Failure  2L oxygen requirement overnight, on RA this morning. Suspect mild pulmonary edema s/p STEMI vs atelectasis.   - Wean as able  - Mobilize as able  - Consider furosemide as above    # Anemia- Improving  Mild to 12.6 on , suspect from procedural losses and heparin. No hemodynamic changes and no evidence of rapid bleeding. Improving.   - Trend CBC    FEN: Regular, Low sodium  PPx: Lovenox  Code: Full code     Dispo: Floor status, pending bed    This patient's care was discussed with Dr Bibi Dotson, attending cardiologist, who agrees with the assessment and plan unless otherwise indicated.    Mio Lindsey MD MPP, PGY-4  Fellow, Cardiovascular Disease     Objective:   BP (!) 153/96 (BP Location: Left arm)   Pulse 62   Temp 97.5  F (36.4  C) (Oral)   Resp 16   Wt 87.5 kg (193 lb)   SpO2 94%   BMI 30.23 kg/m    Temp (24hrs), Av.5  F (36.9  C), Min:97.8  F (36.6  C), Max:98.9  F (37.2  C)    Physical Exam   GEN: No acute distress   HEENT: EOMI, no icterus, moist mucous membranes   CV: RRR, normal s1/s2, no murmurs. JVP not elevated   CHEST: Normal work of breathing. Lungs CTAB, no wheezes or crackles. Chest wall tender to palpation   ABD: Soft, NT/ND   EXT: Warm and well-perfused, No LE  edema  NEURO: Awake, alert, answering questions appropriately, motor grossly nonfocal  SKIN: No worrisome lesions  PSYCH: cooperative, affect appropriate    DATA:   Labs, EKGs and imaging reviewed. Pertinent results discussed in assessment and plan above.

## 2023-01-09 NOTE — CONSULTS
Discharge Pharmacy Test Claim    Entresto, farxiga, and jardiance are covered through patient's [employer] Medicare Part D plan with copays of $24.    Test Claim Copay   entresto 24.00   farxiga 24.00   jardiance 24.00       Gina Ulloa  [facility] Liaison  Ph: [phone] Pager: [phone]   Securely message with the Vocera Web Console (learn more here)

## 2023-01-09 NOTE — PROGRESS NOTES
ICU End of Shift Summary. See flowsheets for vital signs and detailed assessment.    Changes this shift: Pt alert/oriented x4. Afebrile, HR SB-SR 50-60's with T-wave inversion (CSI notified), MAPs < 170. Continues to have aching muscle pain in chest from chest compressions during ROSC attempts (prn tylenol x2). Previously prevalent SOB without exertion from last night improved tonight. No complaints of increased SOB without indication, but continues to have FELIPE when ambulating to bathroom/in room. Remained on 2L NC to maintain sats > 92%. Pt with 2 soft/formed BMs overnight and voiding spontaneously. No other complaints. Notable labs include***    Plan: Continue to manage FELIPE

## 2023-01-10 ENCOUNTER — APPOINTMENT (OUTPATIENT)
Dept: GENERAL RADIOLOGY | Facility: CLINIC | Age: 73
DRG: 246 | End: 2023-01-10
Attending: STUDENT IN AN ORGANIZED HEALTH CARE EDUCATION/TRAINING PROGRAM
Payer: COMMERCIAL

## 2023-01-10 ENCOUNTER — APPOINTMENT (OUTPATIENT)
Dept: GENERAL RADIOLOGY | Facility: CLINIC | Age: 73
DRG: 246 | End: 2023-01-10
Attending: NURSE PRACTITIONER
Payer: COMMERCIAL

## 2023-01-10 LAB
ALBUMIN SERPL BCG-MCNC: 3.4 G/DL (ref 3.5–5.2)
ALP SERPL-CCNC: 100 U/L (ref 40–129)
ALT SERPL W P-5'-P-CCNC: 44 U/L (ref 10–50)
ANION GAP SERPL CALCULATED.3IONS-SCNC: 13 MMOL/L (ref 7–15)
AST SERPL W P-5'-P-CCNC: 52 U/L (ref 10–50)
BILIRUB SERPL-MCNC: 1 MG/DL
BUN SERPL-MCNC: 20.3 MG/DL (ref 8–23)
CALCIUM SERPL-MCNC: 8.7 MG/DL (ref 8.8–10.2)
CHLORIDE SERPL-SCNC: 104 MMOL/L (ref 98–107)
CREAT SERPL-MCNC: 0.74 MG/DL (ref 0.67–1.17)
CRP SERPL-MCNC: 66.3 MG/L
DEPRECATED HCO3 PLAS-SCNC: 21 MMOL/L (ref 22–29)
ERYTHROCYTE [DISTWIDTH] IN BLOOD BY AUTOMATED COUNT: 13.7 % (ref 10–15)
ERYTHROCYTE [SEDIMENTATION RATE] IN BLOOD BY WESTERGREN METHOD: 30 MM/HR (ref 0–20)
GFR SERPL CREATININE-BSD FRML MDRD: >90 ML/MIN/1.73M2
GLUCOSE SERPL-MCNC: 99 MG/DL (ref 70–99)
HCT VFR BLD AUTO: 35.3 % (ref 40–53)
HGB BLD-MCNC: 11.9 G/DL (ref 13.3–17.7)
MAGNESIUM SERPL-MCNC: 1.8 MG/DL (ref 1.7–2.3)
MCH RBC QN AUTO: 31.1 PG (ref 26.5–33)
MCHC RBC AUTO-ENTMCNC: 33.7 G/DL (ref 31.5–36.5)
MCV RBC AUTO: 92 FL (ref 78–100)
PHOSPHATE SERPL-MCNC: 3.1 MG/DL (ref 2.5–4.5)
PLATELET # BLD AUTO: 170 10E3/UL (ref 150–450)
POTASSIUM SERPL-SCNC: 3.5 MMOL/L (ref 3.4–5.3)
PROT SERPL-MCNC: 6.5 G/DL (ref 6.4–8.3)
RBC # BLD AUTO: 3.83 10E6/UL (ref 4.4–5.9)
SODIUM SERPL-SCNC: 138 MMOL/L (ref 136–145)
TROPONIN T SERPL HS-MCNC: 2135 NG/L
TROPONIN T SERPL HS-MCNC: 2141 NG/L
TROPONIN T SERPL HS-MCNC: 2288 NG/L
WBC # BLD AUTO: 7.3 10E3/UL (ref 4–11)

## 2023-01-10 PROCEDURE — 250N000013 HC RX MED GY IP 250 OP 250 PS 637: Performed by: INTERNAL MEDICINE

## 2023-01-10 PROCEDURE — 250N000011 HC RX IP 250 OP 636: Performed by: INTERNAL MEDICINE

## 2023-01-10 PROCEDURE — 36415 COLL VENOUS BLD VENIPUNCTURE: CPT | Performed by: NURSE PRACTITIONER

## 2023-01-10 PROCEDURE — 250N000013 HC RX MED GY IP 250 OP 250 PS 637: Performed by: STUDENT IN AN ORGANIZED HEALTH CARE EDUCATION/TRAINING PROGRAM

## 2023-01-10 PROCEDURE — 84100 ASSAY OF PHOSPHORUS: CPT | Performed by: INTERNAL MEDICINE

## 2023-01-10 PROCEDURE — 120N000002 HC R&B MED SURG/OB UMMC

## 2023-01-10 PROCEDURE — 84484 ASSAY OF TROPONIN QUANT: CPT | Performed by: STUDENT IN AN ORGANIZED HEALTH CARE EDUCATION/TRAINING PROGRAM

## 2023-01-10 PROCEDURE — 71111 X-RAY EXAM RIBS/CHEST4/> VWS: CPT

## 2023-01-10 PROCEDURE — 86140 C-REACTIVE PROTEIN: CPT | Performed by: NURSE PRACTITIONER

## 2023-01-10 PROCEDURE — C1887 CATHETER, GUIDING: HCPCS | Performed by: INTERNAL MEDICINE

## 2023-01-10 PROCEDURE — 272N000001 HC OR GENERAL SUPPLY STERILE: Performed by: INTERNAL MEDICINE

## 2023-01-10 PROCEDURE — 93010 ELECTROCARDIOGRAM REPORT: CPT | Performed by: INTERNAL MEDICINE

## 2023-01-10 PROCEDURE — 99233 SBSQ HOSP IP/OBS HIGH 50: CPT | Mod: 25 | Performed by: STUDENT IN AN ORGANIZED HEALTH CARE EDUCATION/TRAINING PROGRAM

## 2023-01-10 PROCEDURE — 99152 MOD SED SAME PHYS/QHP 5/>YRS: CPT | Performed by: INTERNAL MEDICINE

## 2023-01-10 PROCEDURE — 85027 COMPLETE CBC AUTOMATED: CPT | Performed by: STUDENT IN AN ORGANIZED HEALTH CARE EDUCATION/TRAINING PROGRAM

## 2023-01-10 PROCEDURE — 83735 ASSAY OF MAGNESIUM: CPT | Performed by: STUDENT IN AN ORGANIZED HEALTH CARE EDUCATION/TRAINING PROGRAM

## 2023-01-10 PROCEDURE — B2111ZZ FLUOROSCOPY OF MULTIPLE CORONARY ARTERIES USING LOW OSMOLAR CONTRAST: ICD-10-PCS | Performed by: INTERNAL MEDICINE

## 2023-01-10 PROCEDURE — 71111 X-RAY EXAM RIBS/CHEST4/> VWS: CPT | Mod: 26 | Performed by: RADIOLOGY

## 2023-01-10 PROCEDURE — 93454 CORONARY ARTERY ANGIO S&I: CPT | Mod: 26 | Performed by: INTERNAL MEDICINE

## 2023-01-10 PROCEDURE — 250N000011 HC RX IP 250 OP 636: Performed by: STUDENT IN AN ORGANIZED HEALTH CARE EDUCATION/TRAINING PROGRAM

## 2023-01-10 PROCEDURE — 250N000009 HC RX 250: Performed by: INTERNAL MEDICINE

## 2023-01-10 PROCEDURE — 71045 X-RAY EXAM CHEST 1 VIEW: CPT

## 2023-01-10 PROCEDURE — 84484 ASSAY OF TROPONIN QUANT: CPT | Performed by: NURSE PRACTITIONER

## 2023-01-10 PROCEDURE — 85652 RBC SED RATE AUTOMATED: CPT | Performed by: NURSE PRACTITIONER

## 2023-01-10 PROCEDURE — 71045 X-RAY EXAM CHEST 1 VIEW: CPT | Mod: 26 | Performed by: RADIOLOGY

## 2023-01-10 PROCEDURE — 93454 CORONARY ARTERY ANGIO S&I: CPT | Performed by: INTERNAL MEDICINE

## 2023-01-10 PROCEDURE — 80053 COMPREHEN METABOLIC PANEL: CPT | Performed by: STUDENT IN AN ORGANIZED HEALTH CARE EDUCATION/TRAINING PROGRAM

## 2023-01-10 PROCEDURE — C1894 INTRO/SHEATH, NON-LASER: HCPCS | Performed by: INTERNAL MEDICINE

## 2023-01-10 PROCEDURE — 36415 COLL VENOUS BLD VENIPUNCTURE: CPT | Performed by: STUDENT IN AN ORGANIZED HEALTH CARE EDUCATION/TRAINING PROGRAM

## 2023-01-10 PROCEDURE — 93005 ELECTROCARDIOGRAM TRACING: CPT

## 2023-01-10 PROCEDURE — C1769 GUIDE WIRE: HCPCS | Performed by: INTERNAL MEDICINE

## 2023-01-10 RX ORDER — FENTANYL CITRATE 50 UG/ML
INJECTION, SOLUTION INTRAMUSCULAR; INTRAVENOUS
Status: DISCONTINUED | OUTPATIENT
Start: 2023-01-10 | End: 2023-01-10 | Stop reason: HOSPADM

## 2023-01-10 RX ORDER — KETOROLAC TROMETHAMINE 15 MG/ML
15 INJECTION, SOLUTION INTRAMUSCULAR; INTRAVENOUS ONCE
Status: DISCONTINUED | OUTPATIENT
Start: 2023-01-10 | End: 2023-01-10

## 2023-01-10 RX ORDER — OXYCODONE HYDROCHLORIDE 5 MG/1
5 TABLET ORAL EVERY 4 HOURS
Status: DISPENSED | OUTPATIENT
Start: 2023-01-10 | End: 2023-01-10

## 2023-01-10 RX ORDER — NALOXONE HYDROCHLORIDE 0.4 MG/ML
0.2 INJECTION, SOLUTION INTRAMUSCULAR; INTRAVENOUS; SUBCUTANEOUS
Status: DISCONTINUED | OUTPATIENT
Start: 2023-01-10 | End: 2023-01-12 | Stop reason: HOSPADM

## 2023-01-10 RX ORDER — HEPARIN SODIUM 1000 [USP'U]/ML
INJECTION, SOLUTION INTRAVENOUS; SUBCUTANEOUS
Status: DISCONTINUED | OUTPATIENT
Start: 2023-01-10 | End: 2023-01-10 | Stop reason: HOSPADM

## 2023-01-10 RX ORDER — NALOXONE HYDROCHLORIDE 0.4 MG/ML
0.4 INJECTION, SOLUTION INTRAMUSCULAR; INTRAVENOUS; SUBCUTANEOUS
Status: DISCONTINUED | OUTPATIENT
Start: 2023-01-10 | End: 2023-01-12 | Stop reason: HOSPADM

## 2023-01-10 RX ORDER — ACETAMINOPHEN 325 MG/1
325 TABLET ORAL ONCE
Status: COMPLETED | OUTPATIENT
Start: 2023-01-10 | End: 2023-01-10

## 2023-01-10 RX ORDER — COLCHICINE 0.6 MG/1
0.6 TABLET ORAL 2 TIMES DAILY
Status: DISCONTINUED | OUTPATIENT
Start: 2023-01-10 | End: 2023-01-12 | Stop reason: HOSPADM

## 2023-01-10 RX ORDER — IOPAMIDOL 755 MG/ML
INJECTION, SOLUTION INTRAVASCULAR
Status: DISCONTINUED | OUTPATIENT
Start: 2023-01-10 | End: 2023-01-10 | Stop reason: HOSPADM

## 2023-01-10 RX ORDER — DIPHENHYDRAMINE HYDROCHLORIDE 50 MG/ML
INJECTION INTRAMUSCULAR; INTRAVENOUS
Status: DISCONTINUED | OUTPATIENT
Start: 2023-01-10 | End: 2023-01-10 | Stop reason: HOSPADM

## 2023-01-10 RX ADMIN — ACETAMINOPHEN 325 MG: 325 TABLET, FILM COATED ORAL at 21:19

## 2023-01-10 RX ADMIN — SUCRALFATE 1 G: 1 TABLET ORAL at 11:07

## 2023-01-10 RX ADMIN — ATORVASTATIN CALCIUM 80 MG: 80 TABLET, FILM COATED ORAL at 08:47

## 2023-01-10 RX ADMIN — ASPIRIN 81 MG: 81 TABLET, CHEWABLE ORAL at 08:47

## 2023-01-10 RX ADMIN — LOSARTAN POTASSIUM 50 MG: 50 TABLET, FILM COATED ORAL at 08:47

## 2023-01-10 RX ADMIN — ACETAMINOPHEN 650 MG: 325 TABLET, FILM COATED ORAL at 19:33

## 2023-01-10 RX ADMIN — Medication 12.5 MG: at 08:51

## 2023-01-10 RX ADMIN — SUCRALFATE 1 G: 1 TABLET ORAL at 06:42

## 2023-01-10 RX ADMIN — FUROSEMIDE 40 MG: 40 TABLET ORAL at 08:47

## 2023-01-10 RX ADMIN — SUCRALFATE 1 G: 1 TABLET ORAL at 18:05

## 2023-01-10 RX ADMIN — LIDOCAINE PATCH 4% 1 PATCH: 40 PATCH TOPICAL at 08:56

## 2023-01-10 RX ADMIN — COLCHICINE 0.6 MG: 0.6 TABLET, FILM COATED ORAL at 21:20

## 2023-01-10 RX ADMIN — OXYCODONE HYDROCHLORIDE 5 MG: 5 TABLET ORAL at 09:05

## 2023-01-10 RX ADMIN — PRASUGREL 10 MG: 10 TABLET, FILM COATED ORAL at 08:50

## 2023-01-10 RX ADMIN — SUCRALFATE 1 G: 1 TABLET ORAL at 21:20

## 2023-01-10 RX ADMIN — ENOXAPARIN SODIUM 40 MG: 40 INJECTION SUBCUTANEOUS at 08:48

## 2023-01-10 RX ADMIN — ACETAMINOPHEN 650 MG: 325 TABLET, FILM COATED ORAL at 11:06

## 2023-01-10 RX ADMIN — ACETAMINOPHEN 650 MG: 325 TABLET, FILM COATED ORAL at 06:38

## 2023-01-10 RX ADMIN — PANTOPRAZOLE SODIUM 40 MG: 40 TABLET, DELAYED RELEASE ORAL at 06:38

## 2023-01-10 ASSESSMENT — ACTIVITIES OF DAILY LIVING (ADL)
ADLS_ACUITY_SCORE: 22

## 2023-01-10 NOTE — PROGRESS NOTES
[facility]    Cardiology Progress Note- ICU     Assessment and Plan:     72 year old male who has a history of hypertension, hyperlipidemia who presents as an outside transfer for chest pain found to have anterior ST elevation.  His course has been complicated by ventricular fibrillation and cardiac arrest status post defibrillation with ROSC.  Coronary angiogram revealed obstructive coronary artery disease in the proximal to mid LAD now status post successful revascularization who was admitted to the ICU for post coronary angiogram and intervention cares after his STEMI.    Interval Events:  Ongoing sternal pain at site of compresisons, reproducible on exam with significant tenderness to palpation, worse with deep breathing.     Plan today:  - Continue maintenance diuretic with 40mg Furosemide PO, long-term needs TBD  - Continue Prasugrel  - Low dose opioids for pain control  - Floor transfer pending bed availability     ==================================  # STEMI s/p PCI with SARITHA to mLAD   Found to have 99% thrombotic lesion in prox-to-mid LAD s/p successful PCI with SARITHA x 1. A1C and lipid panel unremarkable.   - Continue daily aspirin 81 mg and prasugrel 10mg daily. Plan for 1 year of DAPT, followed by ASA monotherapy.    > Persistent dyspnea with Ticagrelor, resolved with switch to Prasugrel  - Continue atorvastatin 80 mg daily  - Continue metoprolol succinate 12.5 mg daily and increase losartan to 50 mg daily for guideline directed medical therapy post STEMI  - Continue telemetry and O2 monitoring     #Heart Failure with mildly reduced ejection fraction  #Ischemic Cardiomyopathy  In s/o STEMI, LVEF 40-45%% with anterior wall motion abnormalities consistent with LAD STEMI. Revascularized 01/07.   - Volume: Euvolemic on examination, 40mg Furosemide PO, goal net even today  - GDMT   > Beta Blocker: Metoprolol Succinate 12.5mg    > ACE/ARB/ARNI: Losartan 50mg daily   > MRA:  Consider adding, particularly if mild hypokalemia with diuretic   > SGLT2i: Consider prior to discharge or at follow up  - Therapies   > ICD not indicated   > CRT not indicated   - Management    > Follow up TTE for LVEF recovery after discharge     # Vfib arrest with successful ROSC  This was likely in the setting of his acute coronary event. Started beta-blockade with metoprolol 12.5 mg as above.   - Continue cardiac telemetry for now.   - No indication for ICD post-cardiac arrest since patient's arrest was likely due to ischemia and he has been revascularized.     # Hyperlipidemia  # Coronary artery disease  - Continue high intensity statin therapy with Atorvastatin 80 mg daily      # Hypertension  - Metoprolol and losartan as above     # Acute Hypoxic Respiratory Failure- Resolved   2L oxygen requirement during hospitalization, weaned to room air. Suspect mild pulmonary edema s/p STEMI vs atelectasis. Also a component of splinting in the setting of sternal pain which is ongoing.   - Wean as able  - Pain control  - Mobilize as able  - Consider furosemide as above    # Anemia  Mild to 12.6 on , suspect from procedural losses and heparin. No hemodynamic changes and no evidence of rapid bleeding.   - Trend CBC    FEN: Regular, Low sodium  PPx: Lovenox  Code: Full code     Dispo: Floor status, pending bed    This patient's care was discussed with Dr Bibi Dotson, attending cardiologist, who agrees with the assessment and plan unless otherwise indicated.    Mio Lindsey MD [facility], PGY-4  Fellow, Cardiovascular Disease     Objective:   /77   Pulse 80   Temp 97.8  F (36.6  C) (Oral)   Resp 24   Wt 87.5 kg (193 lb)   SpO2 92%   BMI 30.23 kg/m    Temp (24hrs), Av.5  F (36.9  C), Min:97.8  F (36.6  C), Max:98.9  F (37.2  C)    Physical Exam   GEN: No acute distress   HEENT: EOMI, no icterus, moist mucous membranes   CV: RRR, normal s1/s2, no murmurs. JVP not elevated   CHEST: Normal work  of breathing. Lungs CTAB, no wheezes or crackles. Chest wall tender to palpation   ABD: Soft, NT/ND   EXT: Warm and well-perfused, No LE edema  NEURO: Awake, alert, answering questions appropriately, motor grossly nonfocal  SKIN: No worrisome lesions  PSYCH: cooperative, affect appropriate    DATA:   Labs, EKGs and imaging reviewed. Pertinent results discussed in assessment and plan above.

## 2023-01-10 NOTE — PROGRESS NOTES
Care Management Follow Up    Length of Stay (days): 4    Expected Discharge Date: 01/10/2023     Concerns to be Addressed:     Discharge planning  Patient plan of care discussed at interdisciplinary rounds: Yes    Anticipated Discharge Disposition:  Home     Anticipated Discharge Services: OP Cardiac Rehab    Anticipated Discharge DME:  TBS[facility]AN      Additional Information:  OP Cardiac Rehab order by MD. Patient did not have any questions regarding order. Planning on using [facility]. No other discharge needs at this moment but CC will continue to follow as needed.     MOHIT Baker, JONNYN., RN  Nurse Care Coordinator  Pager: [phone] - RNCC [facility]  Social Work and Care Management Department   [address] [place] [address] [place] [id number]  [email or web address]  Employed by [facility]     SEARCHABLE in [employer] - search CARE COORDINATOR     [place] [facility] [place] ([phone]) Saturday & Sunday; ([phone]) [facility] Recognized Holidays     Units: 4A, 4C, 4E, 5A & 5B   Pager: [phone]    Units: 6A & 6B    Pager: [phone]    Units: 6C & 6D   Pager: [phone]    Units: 7A, 7B, 7C, 7D & 5C    Pager: [phone]    Units: [facility] ED, 5 Ortho, 5 Med/Surg, 6 Med/Surg, 8A, 10 ICU, & [facility]    Pager: [phone]

## 2023-01-10 NOTE — PLAN OF CARE
ICU End of Shift Summary. See flowsheets for vital signs and detailed assessment.    Changes this shift: Alert and oriented x4, VSS on 2L NC, desats to the low 80's on room. Afebrile, BP remains within normal range, HR with T-wave inversion. Up with SBA, to the toilet, uses the urinal when to urinate. Slept in the recliner all night, patient feels it's more comfortable. No acute overnight event.     Plan:  Continue with plan of care, notify provider with changes.      Goal Outcome Evaluation:      Plan of Care Reviewed With: patient    Overall Patient Progress: improvingOverall Patient Progress: improving

## 2023-01-10 NOTE — PLAN OF CARE
ICU End of Shift Summary. See flowsheets for vital signs and detailed assessment.    Changes this shift: Patient walked in hallway on RA, tolerated well. On RA for most of the day but c/o SOB this evening after getting up to use the toilet. Placed back on 2LNC for comfort. Continues to c/o of chest pain r/t CPR. Tylenol given along with cough suppressants, lidocaine patch. Lasix this am.     Plan:  Manage pain/cough as able. Wean oxygen/pulmonary hygiene. Transfer out of ICU when bed available vs. discharge to home.

## 2023-01-11 ENCOUNTER — APPOINTMENT (OUTPATIENT)
Dept: OCCUPATIONAL THERAPY | Facility: CLINIC | Age: 73
DRG: 246 | End: 2023-01-11
Payer: COMMERCIAL

## 2023-01-11 LAB
ALBUMIN SERPL BCG-MCNC: 3.5 G/DL (ref 3.5–5.2)
ALP SERPL-CCNC: 84 U/L (ref 40–129)
ALT SERPL W P-5'-P-CCNC: 51 U/L (ref 10–50)
ANION GAP SERPL CALCULATED.3IONS-SCNC: 14 MMOL/L (ref 7–15)
AST SERPL W P-5'-P-CCNC: ABNORMAL U/L
BILIRUB SERPL-MCNC: 1.2 MG/DL
BUN SERPL-MCNC: 18.5 MG/DL (ref 8–23)
CALCIUM SERPL-MCNC: 8.5 MG/DL (ref 8.8–10.2)
CHLORIDE SERPL-SCNC: 101 MMOL/L (ref 98–107)
CREAT SERPL-MCNC: 0.84 MG/DL (ref 0.67–1.17)
DEPRECATED HCO3 PLAS-SCNC: 22 MMOL/L (ref 22–29)
ERYTHROCYTE [DISTWIDTH] IN BLOOD BY AUTOMATED COUNT: 13.8 % (ref 10–15)
GFR SERPL CREATININE-BSD FRML MDRD: >90 ML/MIN/1.73M2
GLUCOSE SERPL-MCNC: 92 MG/DL (ref 70–99)
HCT VFR BLD AUTO: 36.4 % (ref 40–53)
HGB BLD-MCNC: 12 G/DL (ref 13.3–17.7)
MAGNESIUM SERPL-MCNC: 1.8 MG/DL (ref 1.7–2.3)
MCH RBC QN AUTO: 30.7 PG (ref 26.5–33)
MCHC RBC AUTO-ENTMCNC: 33 G/DL (ref 31.5–36.5)
MCV RBC AUTO: 93 FL (ref 78–100)
PHOSPHATE SERPL-MCNC: 3.6 MG/DL (ref 2.5–4.5)
PLATELET # BLD AUTO: 164 10E3/UL (ref 150–450)
POTASSIUM SERPL-SCNC: 3.5 MMOL/L (ref 3.4–5.3)
PROT SERPL-MCNC: 6.6 G/DL (ref 6.4–8.3)
RBC # BLD AUTO: 3.91 10E6/UL (ref 4.4–5.9)
SODIUM SERPL-SCNC: 137 MMOL/L (ref 136–145)
WBC # BLD AUTO: 7.4 10E3/UL (ref 4–11)

## 2023-01-11 PROCEDURE — 250N000013 HC RX MED GY IP 250 OP 250 PS 637: Performed by: STUDENT IN AN ORGANIZED HEALTH CARE EDUCATION/TRAINING PROGRAM

## 2023-01-11 PROCEDURE — 84155 ASSAY OF PROTEIN SERUM: CPT | Performed by: STUDENT IN AN ORGANIZED HEALTH CARE EDUCATION/TRAINING PROGRAM

## 2023-01-11 PROCEDURE — 250N000011 HC RX IP 250 OP 636: Performed by: STUDENT IN AN ORGANIZED HEALTH CARE EDUCATION/TRAINING PROGRAM

## 2023-01-11 PROCEDURE — 250N000013 HC RX MED GY IP 250 OP 250 PS 637: Performed by: INTERNAL MEDICINE

## 2023-01-11 PROCEDURE — 97110 THERAPEUTIC EXERCISES: CPT | Mod: GO

## 2023-01-11 PROCEDURE — 99233 SBSQ HOSP IP/OBS HIGH 50: CPT | Mod: GC | Performed by: STUDENT IN AN ORGANIZED HEALTH CARE EDUCATION/TRAINING PROGRAM

## 2023-01-11 PROCEDURE — 83735 ASSAY OF MAGNESIUM: CPT | Performed by: STUDENT IN AN ORGANIZED HEALTH CARE EDUCATION/TRAINING PROGRAM

## 2023-01-11 PROCEDURE — 36415 COLL VENOUS BLD VENIPUNCTURE: CPT | Performed by: STUDENT IN AN ORGANIZED HEALTH CARE EDUCATION/TRAINING PROGRAM

## 2023-01-11 PROCEDURE — 80069 RENAL FUNCTION PANEL: CPT | Performed by: INTERNAL MEDICINE

## 2023-01-11 PROCEDURE — 85027 COMPLETE CBC AUTOMATED: CPT | Performed by: STUDENT IN AN ORGANIZED HEALTH CARE EDUCATION/TRAINING PROGRAM

## 2023-01-11 PROCEDURE — 120N000002 HC R&B MED SURG/OB UMMC

## 2023-01-11 RX ORDER — PRASUGREL 10 MG/1
10 TABLET, FILM COATED ORAL DAILY
Qty: 30 TABLET | Refills: 0 | Status: CANCELLED | OUTPATIENT
Start: 2023-01-12 | End: 2023-02-11

## 2023-01-11 RX ORDER — FUROSEMIDE 40 MG
40 TABLET ORAL DAILY
Qty: 30 TABLET | Refills: 0 | Status: CANCELLED | OUTPATIENT
Start: 2023-01-12 | End: 2023-02-11

## 2023-01-11 RX ORDER — COLCHICINE 0.6 MG/1
0.6 TABLET ORAL 2 TIMES DAILY
Qty: 60 TABLET | Refills: 0 | Status: CANCELLED | OUTPATIENT
Start: 2023-01-11 | End: 2023-02-10

## 2023-01-11 RX ORDER — TRAMADOL HYDROCHLORIDE 50 MG/1
50 TABLET ORAL EVERY 6 HOURS
Status: COMPLETED | OUTPATIENT
Start: 2023-01-11 | End: 2023-01-11

## 2023-01-11 RX ORDER — TRAMADOL HYDROCHLORIDE 50 MG/1
50 TABLET ORAL EVERY 6 HOURS
Qty: 10 TABLET | Refills: 0 | Status: CANCELLED | OUTPATIENT
Start: 2023-01-11 | End: 2023-01-14

## 2023-01-11 RX ORDER — CYCLOBENZAPRINE HCL 5 MG
10 TABLET ORAL 2 TIMES DAILY PRN
Status: DISCONTINUED | OUTPATIENT
Start: 2023-01-11 | End: 2023-01-12 | Stop reason: HOSPADM

## 2023-01-11 RX ORDER — METOPROLOL SUCCINATE 25 MG/1
12.5 TABLET, EXTENDED RELEASE ORAL DAILY
Qty: 15 TABLET | Refills: 0 | Status: CANCELLED | OUTPATIENT
Start: 2023-01-12 | End: 2023-02-11

## 2023-01-11 RX ADMIN — LOSARTAN POTASSIUM 50 MG: 50 TABLET, FILM COATED ORAL at 07:33

## 2023-01-11 RX ADMIN — SUCRALFATE 1 G: 1 TABLET ORAL at 22:27

## 2023-01-11 RX ADMIN — Medication 12.5 MG: at 07:38

## 2023-01-11 RX ADMIN — SUCRALFATE 1 G: 1 TABLET ORAL at 10:48

## 2023-01-11 RX ADMIN — COLCHICINE 0.6 MG: 0.6 TABLET, FILM COATED ORAL at 07:38

## 2023-01-11 RX ADMIN — TRAMADOL HYDROCHLORIDE 50 MG: 50 TABLET, COATED ORAL at 19:39

## 2023-01-11 RX ADMIN — ENOXAPARIN SODIUM 40 MG: 40 INJECTION SUBCUTANEOUS at 07:33

## 2023-01-11 RX ADMIN — ACETAMINOPHEN 650 MG: 325 TABLET, FILM COATED ORAL at 01:18

## 2023-01-11 RX ADMIN — COLCHICINE 0.6 MG: 0.6 TABLET, FILM COATED ORAL at 19:39

## 2023-01-11 RX ADMIN — ACETAMINOPHEN 650 MG: 325 TABLET, FILM COATED ORAL at 22:26

## 2023-01-11 RX ADMIN — TRAMADOL HYDROCHLORIDE 50 MG: 50 TABLET, COATED ORAL at 07:33

## 2023-01-11 RX ADMIN — TRAMADOL HYDROCHLORIDE 50 MG: 50 TABLET, COATED ORAL at 13:10

## 2023-01-11 RX ADMIN — PRASUGREL 10 MG: 10 TABLET, FILM COATED ORAL at 07:39

## 2023-01-11 RX ADMIN — ATORVASTATIN CALCIUM 80 MG: 80 TABLET, FILM COATED ORAL at 07:33

## 2023-01-11 RX ADMIN — GUAIFENESIN AND DEXTROMETHORPHAN 10 ML: 100; 10 SYRUP ORAL at 19:39

## 2023-01-11 RX ADMIN — ACETAMINOPHEN 650 MG: 325 TABLET, FILM COATED ORAL at 15:28

## 2023-01-11 RX ADMIN — PANTOPRAZOLE SODIUM 40 MG: 40 TABLET, DELAYED RELEASE ORAL at 07:33

## 2023-01-11 RX ADMIN — SUCRALFATE 1 G: 1 TABLET ORAL at 07:33

## 2023-01-11 RX ADMIN — LIDOCAINE PATCH 4% 1 PATCH: 40 PATCH TOPICAL at 07:33

## 2023-01-11 RX ADMIN — ASPIRIN 81 MG: 81 TABLET, CHEWABLE ORAL at 07:32

## 2023-01-11 RX ADMIN — FUROSEMIDE 40 MG: 40 TABLET ORAL at 07:33

## 2023-01-11 RX ADMIN — SUCRALFATE 1 G: 1 TABLET ORAL at 16:40

## 2023-01-11 ASSESSMENT — ACTIVITIES OF DAILY LIVING (ADL)
ADLS_ACUITY_SCORE: 22

## 2023-01-11 NOTE — PROGRESS NOTES
Transition Planning Follow Up/Request for Possible Home Oxygen    D:  Approached by bedside RN early afternoon stating that Mr. Fisher may need oxygen for home use.  Upon chart review, there was a walk test that was completed by an OT Team member but, qualifying oxygen saturations were not complete and entered on the flow sheet that [facility] requests for there evaluation purposes and insurance documentation needs.  Bedside RN was asked to enter room air oxygen saturations with and without activity and oxygen saturations with oxygen in place while resting and with activity.  It was explained that if any of the above four values are 88% or below, then patient will qualify for oxygen and [facility] can assist with arranging home oxygen.  The [employer] phone number is .  They have been notified about this potential home oxygen need and they are watching patient chart til 4:30 p.m. to look for qualifying oxygen saturations.  If patient does have a qualifying value of 88% or below, there ask is for a MD order for how much oxygen is needed, a qualifying diagnosis and a MD Face to Face statement before they can deliver the oxygen for transport/and setting up oxygen in home setting.  A/P:  Inpatient cares continue per MD order.  If the time gets to be after 4:30 p.m.today and patient is ready for discharge today, the company will have to be called again at the above number to restart the process for obtaining oxygen for home use.  The Respiratory Therapist at [facility] has patients name and insurance information.    RILEY Green.S.YANDY., R.N., P.H.N.  Administrative Nurse Supervisor/[facility]  Care Coordinator Nurse Float/[facility]    After Hours Care Management Pager:  [phone]       Home

## 2023-01-11 NOTE — PLAN OF CARE
ICU End of Shift Summary. See flowsheets for vital signs and detailed assessment.    Changes this shift: a&oX4. Makes needs known. Reporting 10/10 chest pain throughout day. 12 lead done and multiple cardiac labs drawn. Pt to cath lab today. Back from cath lab at 1800. Afebrile. On RA at rest, 2 LPM NC with exertion. Good urine output. Voids spontaneously. No BM this shift.     Plan: Apply appropriate interventions to manage chest/sterum pain. Apply proper interventions for removal of pt R radial hemostasis device. Transfer to floor when bed available.       Goal Outcome Evaluation:      Plan of Care Reviewed With: patient    Overall Patient Progress: improvingOverall Patient Progress: improving    Outcome Evaluation: VSS. Ambulated in [place], intermittently needs oxygen during ambulation. Went to cath lab today.

## 2023-01-11 NOTE — PROGRESS NOTES
[facility]    Cardiology Progress Note- ICU     Assessment and Plan:     72 year old male who has a history of hypertension, hyperlipidemia who presents as an outside transfer for chest pain found to have anterior ST elevation.  His course has been complicated by ventricular fibrillation and cardiac arrest status post defibrillation with ROSC.  Coronary angiogram revealed obstructive coronary artery disease in the proximal to mid LAD now status post successful revascularization who was admitted to the ICU for post coronary angiogram and intervention cares after his STEMI.    Interval Events:  Recurrent chest pain overnight, resolved with tylenol.  Dyspnea improving, oxygenation improving.     Plan today:  - Continue maintenance diuretic with 40mg Furosemide PO, long-term needs TBD  - Continue Prasugrel  - Low dose opioids for pain control  - Wean O2 as able, home oxygen evaluation  - Floor transfer pending bed availability     ==================================  # STEMI s/p PCI with SARITHA to mLAD   Found to have 99% thrombotic lesion in prox-to-mid LAD s/p successful PCI with SARITHA x 1. A1C and lipid panel unremarkable.   - Continue daily aspirin 81 mg and prasugrel 10mg daily. Plan for 1 year of DAPT, followed by ASA monotherapy.    > Persistent dyspnea with Ticagrelor, resolved with switch to Prasugrel  - Continue atorvastatin 80 mg daily  - Continue metoprolol succinate 12.5 mg daily and increase losartan to 50 mg daily for guideline directed medical therapy post STEMI  - Continue telemetry and O2 monitoring     #Heart Failure with mildly reduced ejection fraction  #Ischemic Cardiomyopathy  In s/o STEMI, LVEF 40-45%% with anterior wall motion abnormalities consistent with LAD STEMI. Revascularized 01/07.   - Volume: Euvolemic on examination, 40mg Furosemide PO, goal net even today  - GDMT   > Beta Blocker: Metoprolol Succinate 12.5mg    > ACE/ARB/ARNI: Losartan 50mg daily   >  MRA: Consider adding, particularly if mild hypokalemia with diuretic   > SGLT2i: Consider prior to discharge or at follow up  - Therapies   > ICD not indicated   > CRT not indicated   - Management    > Follow up TTE for LVEF recovery after discharge     # Vfib arrest with successful ROSC  This was likely in the setting of his acute coronary event. Started beta-blockade with metoprolol 12.5 mg as above.   - Continue cardiac telemetry for now.   - No indication for ICD post-cardiac arrest since patient's arrest was likely due to ischemia and he has been revascularized.     # Hyperlipidemia  # Coronary artery disease  - Continue high intensity statin therapy with Atorvastatin 80 mg daily      # Hypertension  - Metoprolol and losartan as above     # Acute Hypoxic Respiratory Failure- Resolved   2L oxygen requirement during hospitalization, weaned to room air. Suspect mild pulmonary edema s/p STEMI vs atelectasis. Also a component of splinting in the setting of sternal pain which is ongoing.   Improved to room air this afternoon.   - Pain control  - Mobilize as able  - Consider furosemide as above    # Anemia  Mild to 12.6 on , suspect from procedural losses and heparin. No hemodynamic changes and no evidence of rapid bleeding.   - Trend CBC    FEN: Regular, Low sodium  PPx: Lovenox  Code: Full code     Dispo: Floor status, pending bed    This patient's care was discussed with Dr Bibi Dotson, attending cardiologist, who agrees with the assessment and plan unless otherwise indicated.    Mio Lindsey MD [facility], PGY-4  Fellow, Cardiovascular Disease     Objective:   /88 (BP Location: Left arm)   Pulse 70   Temp 98.2  F (36.8  C) (Oral)   Resp 18   Wt 85.2 kg (187 lb 12.8 oz)   SpO2 91%   BMI 29.41 kg/m    Temp (24hrs), Av.5  F (36.9  C), Min:97.8  F (36.6  C), Max:98.9  F (37.2  C)    Physical Exam   GEN: No acute distress   HEENT: EOMI, no icterus, moist mucous membranes   CV: RRR,  normal s1/s2, no murmurs. JVP not elevated   CHEST: Normal work of breathing. Lungs CTAB, no wheezes or crackles. Chest wall tender to palpation   ABD: Soft, NT/ND   EXT: Warm and well-perfused, No LE edema  NEURO: Awake, alert, answering questions appropriately, motor grossly nonfocal  SKIN: No worrisome lesions  PSYCH: cooperative, affect appropriate    DATA:   Labs, EKGs and imaging reviewed. Pertinent results discussed in assessment and plan above.

## 2023-01-11 NOTE — CARE PLAN
SIX MINUTE WALK TEST  Cardiac Rehab  2023    Jordan Fisher MRN# [id number]   YOB: 1950 Age: 72 year old     Height: Data Unavailable  Weight (lbs): 187 lbs 12.8 oz Weight (kg): 89.6 kg (dosing weight)    Supplemental oxygen during the test: Yes, flow 2 L/min    Oxygen Appliance: Nasal Cannula    Oximetry: Finger Probe    Gait Aid: None     Pre-test Post-test   Time   6:00   Blood Pressure (mm Hg) 115/75 124/82   Heart rate (bpm) 70 81   Rated Perceived Dyspnea (Bere Scale)   1 4   Rated Perceived Exertion (Bere Scale) 1 4   SpO2 (%) 94 94     Total distance walked in 6 minutes: 375 feet     Paused during test?No  Number of pauses: 0   Total Time stopped: 0     Stopped test before 6 minutes? No  Time completed: 6:00   Distance: 375   Reason: n/a    Did the patient experience any pain or discomfort during the test? No  Pain Ratin/10  Pain Description: Angina  Comments: Pt c/o slight pain in chest area from CPR     Oxygen Titration Required: No  Resting oxygen requirement: 2 Liters on Nasal Cannula  Exercise oxygen requirement: 2 Liters on Nasal Cannula    Performing Staff: Ivania Lui, OT

## 2023-01-11 NOTE — SIGNIFICANT EVENT
SPIRITUAL HEALTH SERVICES Significant Event  [employer] [facility] [employer]  [facility] ([place]) 4c    Pt anointed by Father Nima Cadena   Pager [phone]

## 2023-01-11 NOTE — PLAN OF CARE
ICU End of Shift Summary. See flowsheets for vital signs and detailed assessment.    Changes this shift: Alert and oriented x4, VSS on 2L NC. Afebrile, stable blood pressure. TR band removed, site with no sign of bleeding/hematoma. Urinal at bedside, good urine output, no BM.  Constant chest pain managed with tylenol with some improvement, cards paged regarding patient's pain, one time dose of tylenol ordered to be given with PRN.       Plan:  Continue with plan of care, monitor TR band site. Notify provider with changes.     Goal Outcome Evaluation:      Plan of Care Reviewed With: patient    Overall Patient Progress: improvingOverall Patient Progress: improving

## 2023-01-12 VITALS
WEIGHT: 187.5 LBS | BODY MASS INDEX: 29.37 KG/M2 | RESPIRATION RATE: 16 BRPM | TEMPERATURE: 97.9 F | OXYGEN SATURATION: 94 % | DIASTOLIC BLOOD PRESSURE: 68 MMHG | HEART RATE: 79 BPM | SYSTOLIC BLOOD PRESSURE: 122 MMHG

## 2023-01-12 LAB
ALBUMIN SERPL BCG-MCNC: 3.7 G/DL (ref 3.5–5.2)
ALP SERPL-CCNC: 85 U/L (ref 40–129)
ALT SERPL W P-5'-P-CCNC: 62 U/L (ref 10–50)
ANION GAP SERPL CALCULATED.3IONS-SCNC: 14 MMOL/L (ref 7–15)
AST SERPL W P-5'-P-CCNC: 57 U/L (ref 10–50)
BILIRUB SERPL-MCNC: 1 MG/DL
BUN SERPL-MCNC: 24.5 MG/DL (ref 8–23)
CALCIUM SERPL-MCNC: 8.5 MG/DL (ref 8.8–10.2)
CHLORIDE SERPL-SCNC: 101 MMOL/L (ref 98–107)
CREAT SERPL-MCNC: 0.88 MG/DL (ref 0.67–1.17)
DEPRECATED HCO3 PLAS-SCNC: 21 MMOL/L (ref 22–29)
ERYTHROCYTE [DISTWIDTH] IN BLOOD BY AUTOMATED COUNT: 13.6 % (ref 10–15)
GFR SERPL CREATININE-BSD FRML MDRD: 90 ML/MIN/1.73M2
GLUCOSE SERPL-MCNC: 101 MG/DL (ref 70–99)
HCT VFR BLD AUTO: 36.5 % (ref 40–53)
HGB BLD-MCNC: 12.2 G/DL (ref 13.3–17.7)
MAGNESIUM SERPL-MCNC: 2 MG/DL (ref 1.7–2.3)
MCH RBC QN AUTO: 31 PG (ref 26.5–33)
MCHC RBC AUTO-ENTMCNC: 33.4 G/DL (ref 31.5–36.5)
MCV RBC AUTO: 93 FL (ref 78–100)
PHOSPHATE SERPL-MCNC: 3.4 MG/DL (ref 2.5–4.5)
PLATELET # BLD AUTO: 174 10E3/UL (ref 150–450)
POTASSIUM SERPL-SCNC: 3.7 MMOL/L (ref 3.4–5.3)
PROT SERPL-MCNC: 6.9 G/DL (ref 6.4–8.3)
RBC # BLD AUTO: 3.93 10E6/UL (ref 4.4–5.9)
SODIUM SERPL-SCNC: 136 MMOL/L (ref 136–145)
WBC # BLD AUTO: 8.4 10E3/UL (ref 4–11)

## 2023-01-12 PROCEDURE — 80053 COMPREHEN METABOLIC PANEL: CPT | Performed by: STUDENT IN AN ORGANIZED HEALTH CARE EDUCATION/TRAINING PROGRAM

## 2023-01-12 PROCEDURE — 250N000013 HC RX MED GY IP 250 OP 250 PS 637: Performed by: STUDENT IN AN ORGANIZED HEALTH CARE EDUCATION/TRAINING PROGRAM

## 2023-01-12 PROCEDURE — 84100 ASSAY OF PHOSPHORUS: CPT | Performed by: INTERNAL MEDICINE

## 2023-01-12 PROCEDURE — 250N000013 HC RX MED GY IP 250 OP 250 PS 637: Performed by: INTERNAL MEDICINE

## 2023-01-12 PROCEDURE — 83735 ASSAY OF MAGNESIUM: CPT | Performed by: STUDENT IN AN ORGANIZED HEALTH CARE EDUCATION/TRAINING PROGRAM

## 2023-01-12 PROCEDURE — 250N000011 HC RX IP 250 OP 636: Performed by: STUDENT IN AN ORGANIZED HEALTH CARE EDUCATION/TRAINING PROGRAM

## 2023-01-12 PROCEDURE — 36415 COLL VENOUS BLD VENIPUNCTURE: CPT | Performed by: STUDENT IN AN ORGANIZED HEALTH CARE EDUCATION/TRAINING PROGRAM

## 2023-01-12 PROCEDURE — 85027 COMPLETE CBC AUTOMATED: CPT | Performed by: STUDENT IN AN ORGANIZED HEALTH CARE EDUCATION/TRAINING PROGRAM

## 2023-01-12 PROCEDURE — 999N000147 HC STATISTIC PT IP EVAL DEFER

## 2023-01-12 PROCEDURE — 99239 HOSP IP/OBS DSCHRG MGMT >30: CPT | Mod: 25 | Performed by: NURSE PRACTITIONER

## 2023-01-12 RX ORDER — FUROSEMIDE 40 MG
40 TABLET ORAL DAILY
Qty: 90 TABLET | Refills: 3 | Status: SHIPPED | OUTPATIENT
Start: 2023-01-12 | End: 2023-01-31

## 2023-01-12 RX ORDER — METOPROLOL SUCCINATE 25 MG/1
12.5 TABLET, EXTENDED RELEASE ORAL DAILY
Qty: 45 TABLET | Refills: 3 | Status: SHIPPED | OUTPATIENT
Start: 2023-01-12 | End: 2023-01-31

## 2023-01-12 RX ORDER — PRASUGREL 10 MG/1
10 TABLET, FILM COATED ORAL DAILY
Qty: 90 TABLET | Refills: 3 | Status: SHIPPED | OUTPATIENT
Start: 2023-01-12 | End: 2024-01-26

## 2023-01-12 RX ORDER — ATORVASTATIN CALCIUM 40 MG/1
80 TABLET, FILM COATED ORAL DAILY
Qty: 60 TABLET | Refills: 0 | Status: SHIPPED | OUTPATIENT
Start: 2023-01-12 | End: 2023-01-31 | Stop reason: DRUGHIGH

## 2023-01-12 RX ORDER — COLCHICINE 0.6 MG/1
0.6 TABLET ORAL 2 TIMES DAILY
Qty: 90 TABLET | Refills: 3 | Status: SHIPPED | OUTPATIENT
Start: 2023-01-12 | End: 2023-01-31

## 2023-01-12 RX ADMIN — PRASUGREL 10 MG: 10 TABLET, FILM COATED ORAL at 08:00

## 2023-01-12 RX ADMIN — ATORVASTATIN CALCIUM 80 MG: 80 TABLET, FILM COATED ORAL at 07:48

## 2023-01-12 RX ADMIN — ASPIRIN 81 MG: 81 TABLET, CHEWABLE ORAL at 07:46

## 2023-01-12 RX ADMIN — ACETAMINOPHEN 650 MG: 325 TABLET, FILM COATED ORAL at 03:55

## 2023-01-12 RX ADMIN — Medication 12.5 MG: at 07:48

## 2023-01-12 RX ADMIN — GUAIFENESIN AND DEXTROMETHORPHAN 10 ML: 100; 10 SYRUP ORAL at 01:36

## 2023-01-12 RX ADMIN — PANTOPRAZOLE SODIUM 40 MG: 40 TABLET, DELAYED RELEASE ORAL at 07:49

## 2023-01-12 RX ADMIN — SUCRALFATE 1 G: 1 TABLET ORAL at 07:59

## 2023-01-12 RX ADMIN — COLCHICINE 0.6 MG: 0.6 TABLET, FILM COATED ORAL at 07:47

## 2023-01-12 RX ADMIN — ENOXAPARIN SODIUM 40 MG: 40 INJECTION SUBCUTANEOUS at 07:49

## 2023-01-12 RX ADMIN — FUROSEMIDE 40 MG: 40 TABLET ORAL at 07:49

## 2023-01-12 RX ADMIN — LOSARTAN POTASSIUM 50 MG: 50 TABLET, FILM COATED ORAL at 07:49

## 2023-01-12 ASSESSMENT — ACTIVITIES OF DAILY LIVING (ADL)
ADLS_ACUITY_SCORE: 22

## 2023-01-12 NOTE — DISCHARGE INSTRUCTIONS
Going home after a Heart Attack with Stent Placement    PROCEDURE SITE:  It is normal to have soreness and small bruising at the puncture site as well as mild tingling in your hand for up to 3 days. You may shower but please do not use a hot tub, bath tub or pool for 2 days. Do not apply any lotion or powder near the site for 3 days. For 3 days do not use your affected hand/arm to support your weight (like rising up out of a chair) or lifting >5 pounds.    ACTIVITY:  Keep in mind everyone will recover at a different pace. This can be related to your activity level prior to the heart attack as well as how much damage your heart attack caused. Eventually the goal per the American Heart Association is 30 minutes of moderate exercise 5 times a week. In the first 2 weeks it is best for all patient to avoid strenuous/vigorous exercise including sex.    MEDICATIONS:  1. You have begun Effient (prasugrel). This medication is essential for your stent(s). Do not stop it without speaking first to your heart doctor or their nurse- this includes if you have concerns about side effects or cost. Also, if another health provider tells you to stop it, let them know they need to discuss it with your heart doctor.   2. If you are on Metformin (Glucophage) or any medications that contain this, you should not take it for at least 48 hours (2 days) from the time of your procedure. If you have baseline kidney problems, you may be instructed to also have a lab test drawn in 2-3 days before getting the okay to restart it.  3. If you have not been continued on other medications you were previously taking at home it is probably for reason though please discuss your concerns with any of your doctors.     DIET:  We recommend a diet low in saturated fat, trans fat and cholesterol. In addition it will be helpful to be cautious of sodium intake and carbohydrates. Try to increase the amount of lean meats you eat like fish and chicken, but avoid  frying; and reduce the amount of red meat you eat. Eat more fresh fruits and vegetables and try to avoid canned and processed food. Please reference the handouts you received for more specific information.    CARDIAC REHAB:  After the initial healing process of the procedure site, we recommend cardiac rehabilitation for all heart attack and stent patients. Cardiac rehabilitation will help you:  - Rebuild stamina, strength and balance.  - Learn how to participate in activities safely, as well as help you regain confidence to do so.  - Return to activities of daily living and leisure.  You should receive a call from them within the next week, but if you do not or you would like to call you can contact their central scheduling at [phone]    OTHER INFORMATION:  1. Consider having your family members learn CPR if they do not know it already.  2. It's not uncommon for heart attack sufferers to experience feelings of depression, anxiety or denial. Please do not be afraid to discuss these symptoms with any of your health providers at any point in your recovery.  3. If you are a smoker, quitting smoking will be one of the most important things you can do for yourself. There are nicotine replacement options or medications they might be able to be prescribed. Please discuss this with your doctors. Consider calling the [employer]uitPlan at [phone] ([phone]) as they can offer ongoing support after discharge.    CALL YOUR DOCTOR IF:  -You have a large or growing lump/bump around the procedure site  -The site is red, swollen, hot, tender or has drainage  -You have hives, a rash or unusual itching  -You have increasing or worsening shortness of breath or chest pain    FOLLOW UP:  We prefer you to follow up with your primary care provider within one week. You will be arranged to see the cardiologist (heart doctor) in clinic in about 2 weeks    Should you need to contact us:  Cardiology clinic for scheduling or triage nurse  questions/concerns:  [phone]

## 2023-01-12 NOTE — PROGRESS NOTES
Patient has been assessed for Home Oxygen needs. Oxygen readings:    *Pulse oximetry (SpO2) = 96% on room air at rest while awake.    *SpO2 improved to na % on na liters/minute at rest.    *SpO2 = 90 % on room air during activity/with exercise at the lowest. Mostly > 92 %    *SpO2 improved to na % on na liters/minute during activity/with exercise.

## 2023-01-12 NOTE — PLAN OF CARE
Transferred to: 6C at 0450  Status at time of transfer:   Belongings: Sent with patient; tote bag with ipad, cell phone, and two chargers, and a pt belonging bag of shoes.   Romero removed? (if no, why?): Yes, no romero at time of transfer  Chart and medications: Sent with patient  Family notified: Pt is notifying spouse of transfer.     ICU End of Shift Summary. See flowsheets for vital signs and detailed assessment.    Changes this shift: No acute events overnight. Able to make needs known, using call light appropriately. RA during the day, required 1-2L of NC when asleep, would desat to high 86-88% without any oxygen. Denies SOB/FELIPE. Intermittently coughing, PRN robitussin administered with improvement. Independently ambulating to the bathroom and performing ADLs. B<x2. Frequent voiding overnight, denies any pain or issues with voiding. Intermittently c/o pain, pt states aching from when EMS performed chest compressions. PRN tylenol administered with improvement.     Plan: Plan to discharge in the morning.  Goal Outcome Evaluation:    Plan of Care Reviewed With: patient    Overall Patient Progress: improving

## 2023-01-12 NOTE — PLAN OF CARE
Physical Therapy Defer - Orders received, chart reviewed. Pt nursing notes, pt mobilizing IND, discharging today. No IP PT needs indicated. Will complete consult and defer evaluation, please reconsult as appropriate if patient has decline in functional mobility requiring further skilled inpatient PT needs. Defer Discharge recommendations to medical team.

## 2023-01-12 NOTE — PLAN OF CARE
ICU End of Shift Summary. See flowsheets for vital signs and detailed assessment.    Changes this shift: Pt A&Ox4. Walks independently with supervision (on monitor). Currently on RA at rest and with exertion. HR 60-80s. Afebrile. x1 BM this shift. Adequate urine output. On low Na diet.     Plan: Plan to discharge tomorrow.     Goal Outcome Evaluation:          Overall Patient Progress: improvingOverall Patient Progress: improving    Outcome Evaluation: VSS. Ambulated in hallway. Plan to discharge tomorrow.

## 2023-01-12 NOTE — PLAN OF CARE
Occupational Therapy Discharge Summary    Reason for therapy discharge:    Discharged to home with outpatient therapy.    Progress towards therapy goal(s). See goals on Care Plan in [facility] electronic health record for goal details.  Goals partially met.  Barriers to achieving goals:   discharge from facility.    Therapy recommendation(s):    Continued therapy is recommended.  Rationale/Recommendations:  Pt would benefit from continued therapy in OP CR setting to continue to progress cardiopulmonary health.

## 2023-01-12 NOTE — PROGRESS NOTES
DISCHARGE   Discharged to: Home  Via: Automobile  Accompanied by: Family  Discharge Instructions: principal diagnosis, major findings/procedures, diet, activity, medications, follow up appointments, when to call the MD, and what to watchout for (i.e. s/s of infection, increasing SOB, palpitations, Angina). Pt states understanding of all discharge instructions. Right groin and right wrist cares. Cardiac rehab and sleep study referrals.   Prescriptions: To be filled by  discharge pharmacy, will  on exit from hospital.   Follow Up Appointments: 1/18. Cardiac KAYLEE  Belongings: All sent with pt. Pt verifies that they are taking all belongings with them.   IV: discontinued.   Telemetry: discontinued.   Pt exhibits understanding of above discharge instructions; all questions answered.  Discharge Paperwork: faxed to discharge planner.

## 2023-01-12 NOTE — PROGRESS NOTES
Transfer from  to  at 0450  Diagnosis: STEMI  Via: Wheelchair  Accompanied by: Staff    Belongings: At bedside with pt  Paperwork: In chart   Teaching: Call don't fall, use of console, meal times, visiting hours, orientation to unit, when to call for the RN (angina/sob/dizzyness, etc.), and the importance of safety.   Access: L PIV- SL  Telemetry: Placed on pt      Two nurse head-to-toe skin assessment performed by Arpita WU RN and Rosalina MONTANO RN.  Skin issues noted: Right groin site bruising, right radial site bruising, small right radial site hematoma, lower extremity edema.     A:   Neuro: A/Ox4. Calls appropriately. Denies headache, dizziness, and lightheadedness.  Cardiac/Tele: VSS. SR. Afebrile. C/o chest pain related to prior chest compressions- managed with PRN Tylenol.  Respiratory: Sats >92% on room air while awake. Intermittently needs 1-2L NC while asleep. Infrequent non-productive cough.  GI/: Continent. Urinal at bedside. Last BM 1/12.  Diet/Appetite: Low sodium diet.   Activity: Ind/SBA     P: Expected to discharge home today. Continue to monitor and notify CSI with changes.

## 2023-01-12 NOTE — DISCHARGE SUMMARY
[address] [address]  p: [phone]    Discharge Summary: Cardiology Service    Jordan Fisher MRN# [id number]   YOB: 1950 Age: 72 year old       Admission Date: 01/06/2023  Discharge Date: 01/12/2023    Discharge Diagnoses:  # STEMI s/p PCI with SARITHA to mLAD c/b VF Cardiac arrest  # Acute Systolic Heart Failure with mildly reduced ejection fraction 2/2 Ischemic Cardiomyopathy  # Myopericarditis  # Hypertension  # HLD  # Acute Hypoxic Respiratory Failure- Resolved  # Possible CHUCK   # Acute Anemia, mild    Brief HPI:  Jordan Fisher is a 72 year old male who has a past medical history of hypertension, hyperlipidemia who presented as an outside transfer 1/6 for chest pain found to have anterior ST elevation.  His course was complicated by ventricular fibrillation and cardiac arrest status post defibrillation with ROSC.  Coronary angiogram 1/6 revealed obstructive coronary artery disease in the proximal to mid LAD now status post successful revascularization who was admitted to the ICU for post coronary angiogram and intervention cares after his STEMI. Patient remained mildly hypoxic post PCI, had ongoing chest pain. He underwent repeat coronary angiogram 1/10 which revealed patent stent. Echo revealed EF 40-45%, patient started on Lasix and GDMT with resolution of SOB, hypoxia, and chest pain.     Hospital Course by Diagnosis:  # STEMI s/p PCI with SARITHA to mLAD c/b Cardiac arrest  # Acute Systolic Heart Failure with mildly reduced ejection fraction 2/2 Ischemic Cardiomyopathy  # Myopericarditis  # Hypertension  # HLD  Patient transferred for STEMI c/b VF arrest with ROSC after defibrillation. Found to have 99% thrombotic lesion in prox-to-mid LAD s/p successful PCI with SARITHA x 1. Echo with LVEF 40-45%, anterior wall motion abnormalities consistent with LAD STEMI. A1C and lipid panel unremarkable.     - DAPT: continue 81 mg ASA daily  (lifelong), Prasugrel 10 mg daily (x 1 year; was on Brilinta previously but stopped d/t SOB)  - BB: Metoprolol 12.5 mg daily  - ARB: Continue PTA Losartan 50 mg daily  - Statin: Atorvastatin 80 mg daily  - continue Colchicine 0.6 mg BID for now for possible myopericarditis; CPR 66.3, chest pain improving, also appears to be component of MSK   - OP Cardiac Rehab  - Cardiology Follow up 2 weeks     # Acute Hypoxic Respiratory Failure- Resolved  # Possible CHUCK   2L oxygen requirement during hospitalization, weaned to room air. Suspect mild pulmonary edema s/p STEMI vs atelectasis. Also a component of splinting in the setting of sternal pain which is ongoing.   Improved to room air after PO Lasix started  - Patient passed walk test, no need for home O2  - Sleep Study referral as OP to assess CHUCK  - Diuresis as listed above     # Acute Anemia, mild  Mild to 12.6 on 01/08, suspect from procedural losses and heparin. No hemodynamic changes and no evidence of rapid bleeding.   - Hgb on day of discharge 12.2  - No further work up needed at this time    Pertinent Procedures:  1. Coronary Angiogram with PCI LAD 1/6/2023  2. Coronary Angiogram with patent stents 1/10/2023    Consults:  Cardiac Rehab    Medication Changes:  START Prasugrel 10 mg daily  START Toprol 12.5 mg daily  START Lasix 40 mg daily  START Colchicine 0.6 mg BID    Discharge medications:   Current Discharge Medication List      START taking these medications    Details   colchicine (COLCYRS) 0.6 MG tablet Take 1 tablet (0.6 mg) by mouth 2 times daily  Qty: 90 tablet, Refills: 3    Associated Diagnoses: ST elevation myocardial infarction involving left anterior descending (LAD) coronary artery (H)      furosemide (LASIX) 40 MG tablet Take 1 tablet (40 mg) by mouth daily  Qty: 90 tablet, Refills: 3    Associated Diagnoses: ST elevation myocardial infarction involving left anterior descending (LAD) coronary artery (H)      metoprolol succinate ER (TOPROL XL) 25  MG 24 hr tablet Take 0.5 tablets (12.5 mg) by mouth daily  Qty: 45 tablet, Refills: 3    Associated Diagnoses: ST elevation myocardial infarction involving left anterior descending (LAD) coronary artery (H)      prasugrel (EFFIENT) 10 MG TABS tablet Take 1 tablet (10 mg) by mouth daily  Qty: 90 tablet, Refills: 3    Associated Diagnoses: ST elevation myocardial infarction involving left anterior descending (LAD) coronary artery (H)         CONTINUE these medications which have CHANGED    Details   atorvastatin (LIPITOR) 40 MG tablet Take 2 tablets (80 mg) by mouth daily for 30 days  Qty: 60 tablet, Refills: 0    Associated Diagnoses: ST elevation myocardial infarction involving left anterior descending (LAD) coronary artery (H)         CONTINUE these medications which have NOT CHANGED    Details   famotidine (PEPCID) 20 MG tablet Take 20 mg by mouth 2 times daily      losartan (COZAAR) 50 MG tablet Take 100 mg by mouth daily      omeprazole (PRILOSEC) 40 MG DR capsule Take 40 mg by mouth daily      sucralfate (CARAFATE) 1 GM tablet Take 1 g by mouth 4 times daily      tamsulosin (FLOMAX) 0.4 MG capsule Take 0.4 mg by mouth daily      aspirin 81 mg chewable tablet [ASPIRIN 81 MG CHEWABLE TABLET] Chew 81 mg.      multivitamin with minerals tablet [MULTIVITAMIN WITH MINERALS TABLET] daily.      sildenafil (VIAGRA) 50 MG tablet [SILDENAFIL (VIAGRA) 50 MG TABLET] Take 1 tab po 1hr prior to sexual activity             Follow-up:  - PCP 7-10 days for post hospitalization visit  - Cardiology KAYLEE 2 weeks for STEMI/PCI follow up    Code status:  Full    Condition on discharge  Temp:  [97.9  F (36.6  C)-98.4  F (36.9  C)] 97.9  F (36.6  C)  Pulse:  [59-80] 79  Resp:  [14-22] 16  BP: ()/(62-89) 122/68  SpO2:  [88 %-98 %] 94 %  General: Alert, interactive, NAD  Eyes: sclera anicteric, EOMI  Neck: no JVD, carotid 2+ bilaterally  Cardiovascular: regular rate and rhythm, normal S1 and S2, no murmurs, gallops, or rubs  Resp:  clear to auscultation bilaterally, no rales, wheezes, or rhonchi  GI: Soft, nontender, nondistended. +BS.  No HSM or masses, no rebound or guarding.  Extremities: no edema, no cyanosis or clubbing, dorsalis pedis and posterior tibialis pulses 2+ bilaterally  Skin: Warm and dry, no jaundice or rash; radial artery access site CDI, soft, non-tender to touch, minimal bruising, no swelling, no signs of hematoma  Neuro: CN 2-12 intact, moves all extremities equally  Psych: Alert & oriented x 3    Imaging with results:  Echocardiogram 1/12/2023:  Interpretation Summary  Limited TTE to evaluate for LV function/wall motion.     Left ventricular function is decreased. The ejection fraction is 40-45%  (mildly reduced). There is a pattern of wall motion abnormalities that is  consistent with a LAD culprit infarction.  Global right ventricular function is normal. This study was compared with the  study from 01/07/2023. No significant changes noted.    Coronary Angiogram 1/6/2023:  Conclusion  Anterior STEMI.  Single vessel severe CAD.  99% thrombotic lesion in prox-to-mid LAD s/p successful PCI with SARITHA x 1 (Synergy 3.0 x 38 mm SARITHA, post dilated with 3.5 mm balloon); mild disease in RCA and LCx.  Successful closure of right CFA access with 6F Angioseal.      Plan    Follow bedrest per protocol    Continued medical management and lifestyle modifications for cardiovascular risk factor optimizations.    Arterial sheath removed from femoral artery with closure device.    Femoral angiogram identifies arterial sheath placement suitable for closure device.    Admit to inpatient    1. DAPT with ASA/Ticagrelor x 12 months, ASA monotherapy indefinitely thereafter.  2. High intensity statin  3. Echocardiogram  4. Beta blocker once off pressors and hemodynamically stable  5. Aggressive risk factor modification and medical management of coronary artery disease.  6. Bed rest x 3 hours with femoral access site monitoring as per protocol.      Coronary Findings  DiagnosticDominance: Right  Left Main   The vessel is moderate in size and is angiographically normal.      Left Anterior Descending   The vessel is moderate in size. Large LAD that wraps around the apex. Gives off moderate caliber 1st diagonal proximally and moderate caliber 2nd diagonal in the mid-segment. Gives off small diagonal in distal mid-segment. D1 and D2 have mild proximal disease. There is 99% thrombotic lesion in mid-LAD shortly after the 1st septal and D1. Lesion extends to just beyond the 2nd diagonal.   Prox LAD to Mid LAD lesion is 95% stenosed.   Mid LAD lesion is 99% stenosed. The lesion is type B2 - medium risk and thrombotic.   Dist LAD lesion is 100% stenosed.      First Diagonal Branch   The vessel is moderate in size.   1st Diag lesion is 30% stenosed.      Second Diagonal Branch   The vessel is small.   2nd Diag lesion is 30% stenosed.      Third Diagonal Branch   The vessel is small and is angiographically normal.      Left Circumflex   The vessel is moderate in size and is angiographically normal. Large left circumflex artery. Gives off small caliber high OM1 with mild disease and small short OM2 in the mid-segment. There is a large OM3 branch with minimal luminal irregularities. The distal circumflex gives off a moderate sized OM4 with 30% lesion shortly after its takeoff. The AV groove circumflex after OM4 tapers to a small vessel.      First Obtuse Marginal Branch   The vessel is small. There is mild diffuse disease throughout the vessel.      Second Obtuse Marginal Branch   The vessel is small and is angiographically normal.      Third Obtuse Marginal Branch   The vessel is large. The vessel exhibits minimal luminal irregularities.      Fourth Obtuse Marginal Branch   The vessel is moderate in size.   4th Mrg lesion is 30% stenosed.      Right Coronary Artery   The vessel is moderate in size. Moderate caliber RCA. There is mild 30-40% disease in mid-segment and  focal 20% lesion in distal RCA prior to bifurcation. RCA gives off a small rPDA and moderate sized rPAV/PL system that are each free of significant angiographic disease.   Mid RCA lesion is 30% stenosed.   Dist RCA lesion is 20% stenosed.      Right Posterior Descending Artery   The vessel is small and is angiographically normal.      Right Posterior Atrioventricular Artery   The vessel is moderate in size and is angiographically normal.      First Right Posterolateral Branch   The vessel is small and is angiographically normal.      Second Right Posterolateral Branch   The vessel is small and is angiographically normal.           Intervention   Prox LAD to Mid LAD lesion   Stent (Also treats lesions: Mid LAD)   Lesion length: 24 mm. The pre-interventional distal flow is decreased (EDISON 2). A drug eluting stent was successfully placed. The post-interventional distal flow is normal (EDISON 3). The intervention was successful. No complications occurred at this lesion. After diagnostic angiography decision was made to proceed with PCI of mid-LAD lesion. Heparin was given to maintain ACT > 250s. Patient was loaded with Ticagrelor / ASA prior to arrival to cath lab. An 014 Runthrough guidewire was used to cross the LAD lesion and was placed into the distal LAD. Balloon angioplasty was peformed with a 2.5 x 12 mm Emerge balloon. A Synergy 3.0 x 38 mm SARITHA was successfully deployed in the prox-to-mid LAD. Stent was post dilated with a 3.0 x 20 mm NC Emerge balloon. There was noted to be some flow in the distal LAD and likely apical thrombus embolization. Patient was given 2.5 mg of intracoronary adenosine and 300 mcg of IC nitroglycerin with improvement in flow. IVUS was performed of the prox-to-mid LAD stent to ensure optimal stent expansion. After IVUS, the stent was further post-dilated with a 3.5 x 12 NC Emerge balloon. Final angiography was performed. There was EDISON 3 flow in the LAD. No dissections or perforations were  noted. There was 0% residual stenosis.   There is a 0% residual stenosis post intervention.        Mid LAD lesion   Stent (Also treats lesions: Prox LAD to Mid LAD)   See details in Prox LAD to Mid LAD lesion.   There is a 0% residual stenosis post intervention.             Other imaging studies:  EKG 12 Lead 1/10/2023: NSR HR 68      Patient Care Team:  System, Provider Not In as PCP - General (Clinic)    Shama RHOADES CNP  [facility]    Time Spent on this Encounter   I, Luciana Harrison CNP, personally saw the patient today and spent greater than 30 minutes discharging this patient.    Physician Attestation   I have reviewed and discussed with the advanced practice provider their history, physical and plan for Jordan Fisher. I did not participate in a shared visit by interviewing or examining the patient and this should be billed as an advanced practice provider only visit.    Yariel Montgomery MD  Interventional Cardiology

## 2023-01-13 ENCOUNTER — PATIENT OUTREACH (OUTPATIENT)
Dept: CARE COORDINATION | Facility: CLINIC | Age: 73
End: 2023-01-13

## 2023-01-13 LAB
ATRIAL RATE - MUSE: 68 BPM
DIASTOLIC BLOOD PRESSURE - MUSE: NORMAL MMHG
INTERPRETATION ECG - MUSE: NORMAL
P AXIS - MUSE: 15 DEGREES
PR INTERVAL - MUSE: 236 MS
QRS DURATION - MUSE: 98 MS
QT - MUSE: 456 MS
QTC - MUSE: 484 MS
R AXIS - MUSE: -21 DEGREES
SYSTOLIC BLOOD PRESSURE - MUSE: NORMAL MMHG
T AXIS - MUSE: 105 DEGREES
VENTRICULAR RATE- MUSE: 68 BPM

## 2023-01-13 NOTE — PROGRESS NOTES
[facility]: Post-Discharge Note  SITUATION                                                      Admission:    Admission Date: 01/06/23   Reason for Admission: Chest pain  Discharge:   Discharge Date: 01/12/23  Discharge Diagnosis: STEMI s/p PCI with SARITHA to mLAD c/b VF Cardiac arrest    BACKGROUND                                                      Per hospital discharge summary and inpatient provider notes:  Patient transferred for STEMI c/b VF arrest with ROSC after defibrillation. Found to have 99% thrombotic lesion in prox-to-mid LAD s/p successful PCI with SARITHA x 1. Echo with LVEF 40-45%, anterior wall motion abnormalities consistent with LAD STEMI. A1C and lipid panel unremarkable.      - DAPT: continue 81 mg ASA daily (lifelong), Prasugrel 10 mg daily (x 1 year; was on Brilinta previously but stopped d/t SOB)  - BB: Metoprolol 12.5 mg daily  - ARB: Continue PTA Losartan 50 mg daily  - Statin: Atorvastatin 80 mg daily  - continue Colchicine 0.6 mg BID for now for possible myopericarditis; CPR 66.3, chest pain improving, also appears to be component of MSK   - OP Cardiac Rehab  - Cardiology Follow up 2 weeks    ASSESSMENT        Discharge Assessment  How are you doing now that you are home?: Very well.  How are your symptoms? (Red Flag symptoms escalate to triage hotline per guidelines): Improved  Do you feel your condition is stable enough to be safe at home until your provider visit?: Yes  Does the patient have their discharge instructions? : Yes  Does the patient have questions regarding their discharge instructions? : No  Were you started on any new medications or were there changes to any of your previous medications? : Yes  Does the patient have all of their medications?: Yes  Do you have questions regarding any of your medications? : No  Do you have all of your needed medical supplies or equipment (DME)?  (i.e. oxygen tank, CPAP, cane, etc.): Yes  Discharge follow-up appointment scheduled within 14  calendar days? : Yes  Discharge Follow Up Appointment Date: 01/18/23  Discharge Follow Up Appointment Scheduled with?: Specialty Care Provider    Post-op ([facility] Only)  If the patient had a surgery or procedure, do they have any questions for a nurse?: No      PLAN                                                      Outpatient Plan:  Not noted.    Future Appointments   Date Time Provider Department Center   1/18/2023 10:00 AM DEANN CARDIAC REHAB RESOURCE 2 [facility]   1/31/2023  7:50 AM [facility]   4/12/2023  8:30 AM Dyllan Poole, DO [facility]         For any urgent concerns, please contact our 24 hour nurse triage line: [phone] ([phone])         SAGAR Alvares  [phone]  [facility]  [facility]

## 2023-01-18 ENCOUNTER — HOSPITAL ENCOUNTER (OUTPATIENT)
Dept: CARDIAC REHAB | Facility: HOSPITAL | Age: 73
Discharge: HOME | End: 2023-01-18
Attending: INTERNAL MEDICINE
Payer: COMMERCIAL

## 2023-01-18 DIAGNOSIS — I21.9 ACUTE MYOCARDIAL INFARCTION, INITIAL EPISODE OF CARE (H): ICD-10-CM

## 2023-01-18 PROCEDURE — 93798 PHYS/QHP OP CAR RHAB W/ECG: CPT

## 2023-01-18 PROCEDURE — 93797 PHYS/QHP OP CAR RHAB WO ECG: CPT | Mod: XU

## 2023-01-23 ENCOUNTER — HOSPITAL ENCOUNTER (OUTPATIENT)
Dept: CARDIAC REHAB | Facility: HOSPITAL | Age: 73
Discharge: HOME | End: 2023-01-23
Attending: INTERNAL MEDICINE
Payer: COMMERCIAL

## 2023-01-23 PROCEDURE — 93798 PHYS/QHP OP CAR RHAB W/ECG: CPT

## 2023-01-26 ENCOUNTER — HOSPITAL ENCOUNTER (OUTPATIENT)
Dept: CARDIAC REHAB | Facility: HOSPITAL | Age: 73
Discharge: HOME | End: 2023-01-26
Attending: INTERNAL MEDICINE
Payer: COMMERCIAL

## 2023-01-26 PROCEDURE — 93798 PHYS/QHP OP CAR RHAB W/ECG: CPT

## 2023-01-30 PROBLEM — G47.30 SLEEP APNEA: Status: RESOLVED | Noted: 2022-07-16 | Resolved: 2023-01-30

## 2023-01-30 PROBLEM — I49.01 VENTRICULAR FIBRILLATION (H): Status: ACTIVE | Noted: 2023-01-30

## 2023-01-30 PROBLEM — E78.5 DYSLIPIDEMIA, GOAL LDL BELOW 70: Status: ACTIVE | Noted: 2022-07-16

## 2023-01-30 PROBLEM — I46.9 CARDIAC ARREST (H): Status: ACTIVE | Noted: 2023-01-30

## 2023-01-31 ENCOUNTER — OFFICE VISIT (OUTPATIENT)
Dept: CARDIOLOGY | Facility: CLINIC | Age: 73
End: 2023-01-31
Attending: INTERNAL MEDICINE
Payer: COMMERCIAL

## 2023-01-31 VITALS
RESPIRATION RATE: 14 BRPM | BODY MASS INDEX: 28.04 KG/M2 | DIASTOLIC BLOOD PRESSURE: 70 MMHG | WEIGHT: 179 LBS | SYSTOLIC BLOOD PRESSURE: 116 MMHG | HEART RATE: 84 BPM

## 2023-01-31 DIAGNOSIS — I10 ESSENTIAL (PRIMARY) HYPERTENSION: ICD-10-CM

## 2023-01-31 DIAGNOSIS — I49.01 VENTRICULAR FIBRILLATION (H): ICD-10-CM

## 2023-01-31 DIAGNOSIS — I21.02 ST ELEVATION MYOCARDIAL INFARCTION INVOLVING LEFT ANTERIOR DESCENDING (LAD) CORONARY ARTERY (H): ICD-10-CM

## 2023-01-31 DIAGNOSIS — G47.33 OSA (OBSTRUCTIVE SLEEP APNEA): ICD-10-CM

## 2023-01-31 DIAGNOSIS — Z98.890 S/P CORONARY ANGIOGRAM: ICD-10-CM

## 2023-01-31 DIAGNOSIS — I25.5 ISCHEMIC CARDIOMYOPATHY: ICD-10-CM

## 2023-01-31 DIAGNOSIS — I50.21 ACUTE HFREF (HEART FAILURE WITH REDUCED EJECTION FRACTION) (H): ICD-10-CM

## 2023-01-31 DIAGNOSIS — I21.02 ACUTE ST ELEVATION MYOCARDIAL INFARCTION (STEMI) INVOLVING LEFT ANTERIOR DESCENDING (LAD) CORONARY ARTERY (H): Primary | ICD-10-CM

## 2023-01-31 DIAGNOSIS — E78.5 DYSLIPIDEMIA, GOAL LDL BELOW 70: ICD-10-CM

## 2023-01-31 PROCEDURE — 99204 OFFICE O/P NEW MOD 45 MIN: CPT | Performed by: NURSE PRACTITIONER

## 2023-01-31 RX ORDER — COLCHICINE 0.6 MG/1
0.6 TABLET ORAL DAILY
Qty: 90 TABLET | Refills: 3
Start: 2023-01-31 | End: 2023-02-14

## 2023-01-31 RX ORDER — FUROSEMIDE 40 MG
20 TABLET ORAL DAILY
Qty: 90 TABLET | Refills: 3
Start: 2023-01-31 | End: 2023-02-14

## 2023-01-31 RX ORDER — ATORVASTATIN CALCIUM 80 MG/1
80 TABLET, FILM COATED ORAL DAILY
Qty: 90 TABLET | Refills: 3 | Status: SHIPPED | OUTPATIENT
Start: 2023-01-31 | End: 2023-08-22

## 2023-01-31 RX ORDER — NITROGLYCERIN 0.4 MG/1
TABLET SUBLINGUAL
Qty: 30 TABLET | Refills: 1 | Status: SHIPPED | OUTPATIENT
Start: 2023-01-31

## 2023-01-31 RX ORDER — METOPROLOL SUCCINATE 25 MG/1
25 TABLET, EXTENDED RELEASE ORAL AT BEDTIME
Qty: 90 TABLET | Refills: 3 | Status: SHIPPED | OUTPATIENT
Start: 2023-01-31

## 2023-01-31 NOTE — PATIENT INSTRUCTIONS
Jordan Fisher,    It was a pleasure to see you today at the [facility].     My recommendations after this visit include:    - Decrease Furosemide (Lasix) to 20 mg daily     - Increase Metoprolol Succinate from 12.5 mg to 25 mg daily at bed time. Please let me know if you develop lightheaded, dizziness, or excessive tiredness    - Decrease Colchicine from twice a day to once daily     - Avoid taking Nitroglycerin within 48 hours of taking Viagra due to serious direction     - Please seek medical attention if you develop recurrent chest pain or shortness of breath or similar symptoms you experienced prior to recent cardiac event    - Cardiac rehab as scheduled    - Lauri in 2 weeks in HF clinic    - Follow up with general cardiology in 4-6 weeks     - Please call  [phone], you have any questions or concerns    Lauri Rico CNP    Medication     Take all your medications as prescribed  Do not stop any medications without talking with a healthcare provider    Exercise      Physical activity is important for overall health  Set a goal of 150 minutes of exercise each week  For example, 30 minutes of exercise 5 days each week.    These 30 minutes can be broken into shorter periods of 15 minutes twice daily or 10 minutes three times daily  Start any exercise program slowly and work towards the goal of 150 minutes each week  For example, you may start with 10 minutes and plan to add a few minutes each week as you get stronger   Examples of exercise include walking, swimming, or biking  Remember to stretch and stay hydrated with exercise    Diet     A heart healthy diet includes:  A variety of fruits and vegetables  Whole grains  Low-fat dairy (fat-free, 1% fat, and low-fat)  Lean meats and poultry without skin   Fish (eat fish 2 times each week)  Nuts  Limit saturated fat to about 13 grams each day (based on a 2000 calorie diet)  Limit red meat  Limit sugars (sweets and sugary  beverages)  Limit your portion sizes  Do not add salt to your food when cooking or at the table  Limit alcohol intake (no more than 1 drink each day for women or 2 drinks each day for men)    Weight Loss     Work on losing weight with diet and exercise  You BMI (body mass index) should be between 18.5-24.9  This is a calculation of your weight and height  Please ask your healthcare provider for your BMI    Manage Other Chronic Health Conditions     Control cholesterol  Eat a diet low in saturated fat  Exercise   Take a statin medication as prescribed  Manage blood pressure  Eat a diet low in sodium  Exercise  Reduce stress  Lose weight   Take blood pressure medications as prescribed  Control blood sugars if diabetic  Monitor sugars and carbohydrates in your diet  Lose weight   Take diabetes medications as prescribed  Follow-up with your primary care provider to make sure your blood sugars are well controlled    Stress Reduction     Find time each day to relax  Reading, listening to music, yoga, meditation, exercise, spending time with friends and family, volunteering   Get 6-8 hours of sleep each night    Smoking Cessation     Smoking causes numerous health problems including coronary artery disease  It is never too late to quit  Set realistic goals for quitting  Decrease the number of cigarettes used each week  Use nicotine gum or patches to help you quit    Information from the American Heart Association.  Please visit their website at www.heart.org

## 2023-01-31 NOTE — LETTER
1/31/2023    Provider Not In System       RE: Jordan Fisher       Dear Colleague,     I had the pleasure of seeing Jordan Fisher in the [facility].          Assessment/Recommendations   Assessment:    1.  Coronary artery disease: Patient was hospitalized from January 6 through January 12 with STEMI involving anterior wall.  His course was complicated by ventricular fibrillation and cardiac arrest requiring defibrillation with ROSC.    Current angiogram on 1/6/2023 showed severe disease in proximal to mid LAD which was successfully treated with a drug-eluting stent.  Patient continued to remain mildly hypoxic with ongoing chest pain.  Repeat current angiogram on 1/10/2023 showed patent stent.    On dual antiplatelet therapy with ASA 81 mg indefinitely and Prasugrel (Effient) 10 mg daily for 12 months.  Patient reports some chest soreness from compression.  He also occasionally gets discomfort in his epigastric area.  On colchicine twice a day for possible myopericarditis.  He reports loose stools.  Cardiac rehab has been initiated and tolerating well.    Reviewed most recent BMP, Hgb, platelet- stable.    2.  Dyslipidemia with LDL goal <70/Obesity with a BMI of: Jordan Fisher is on high intensity statin therapy with atorvastatin 80 mg daily. Most recent LDL is 45.  Most recent AST/ALT are mildly elevated but he is asymptomatic.    3.  Ischemic cardiomyopathy with mildly reduced ejection fraction LVEF of 40 to 45% NYHA class I:  He is well compensated with no evidence of fluid tension assessment.   On beta-blocker therapy with metoprolol succinate 12.5 mg daily  On ARB therapy with losartan 100 mg daily  On diuretic therapy with furosemide 40 mg daily.     Patient current weight is 173 pounds at home.  He lost approximately 20 pounds since initial hospitalization from diet change.  He follows low-sodium diet.    We discussed and reviewed about heart failure, medication management, and  lifestyle management including low sodium diet <2 g/day, daily weight, and staying physically active as tolerated.  Provided heart failure education material and resources.     3. Hypertension: Blood pressure stable today.    4.  GERD: On omeprazole and famotidine.    5.  Obstructive sleep apnea: Patient is scheduled for sleep evaluation in April.    Plan:  - We discussed the importance of antiplatelet therapy and talking with his cardiologist prior to stopping these medications for any reason.  We discussed about utilization of as needed nitroglycerin.  Patient is also on as needed Viagra.  We discussed about the serious drug interaction between nitroglycerin and Viagra.  Patient was instructed to avoid taking nitroglycerin within 48 hours of taking Viagra.  Also instructed to hold off on Viagra until follow-up with cardiology.    - Encouraged to seek medical attention if recurrent chest pain or shortness of breath.    - Risk factor modification and lifestyle management topics were discussed including managing comorbidities, weight loss, heart healthy diet, exercise and stress reduction.      -  Cardiac rehab as scheduled     -Decrease furosemide from 40 mg to 20 g daily    -Increase metoprolol succinate from 12.5 mg daily to 25 mg daily to improve systolic function which he agreed.  Patient was instructed to call if experience any side effects.  Decrease colchicine from 0.6 mg twice a day to once daily    -Decrease colchicine from 0.6 mg twice a day to once daily    - We discussed a diet low in saturated fat, weight loss, and exercise along with medication for better control of cholesterol.  Highly encouraged to participate in nutrition class in cardiac rehab.    - Continue current hypertension regimen    -We will plan repeat limited echo in 10 weeks    -We will plan repeat fasting lipid profile and AST and ALT in approximately 4 weeks    Follow up with me in 2 weeks for heart failure follow-up.  Establish care  with general cardiology in 4 to 6 weeks.     History of Present Illness/Subjective    Mr. Jordan Fisher is a 72 year old male with a past medical history of hypertension, hyperlipidemia, overweight, and recent hospitalization with STEMI involving anterior wall, V. fib and cardiac arrest requiring defibrillation and ROSC, dyslipidemia, ischemic cardiomyopathy with acute systolic heart failure with mildly reduced ejection fraction, and now post PCI to mid LAD who is seen at [facility] for post coronary intervention and heart failure follow up.     His initial echocardiogram showed mildly reduced LVEF of 45 to 50%.  Repeat limited echo showed LVEF reduced to 40 to 45% with no significant valve abnormalities noted.     Today, Jordan is here accompanied by his wife, Fatuma.  He reports soreness on his chest from compression.  He has been taking Tylenol which helps.  He also feels occasional mild epigastric discomfort that comes in the form of spasm and resolves with no intervention.  He is in cardiac rehab and tolerating exercise.  He denies fatigue, lightheadedness, shortness of breath, dyspnea on exertion, orthopnea, PND, palpitations, chest pain, abdominal fullness/bloating and lower extremity edema.      Coronary Angiogram on 1/10/23: reviewed:  Conclusion  Widely patent stent in prox-to-mid LAD.  Mild non obstructive disease in RCA and LCx (unchanged from prior angiogram on 1/6/2023).  No new obstructive lesions noted.    Chest wall pain secondary to CPR during recent cardiac arrest.      Plan      Follow bedrest per protocol    Continued medical management and lifestyle modifications for cardiovascular risk factor optimizations.    Follow up visit with Nurse Practitioner in 1-2 weeks.    Arterial sheath removed from radial artery with TR band placement.    Cardiac rehabilitation.    Return to the primary inpatient team for further evaluation and managmenet      1. DAPT x at  least 12 months  2. High intensity statin     Cor Angio on 1/6/23Conclusion  Anterior STEMI.  Single vessel severe CAD.  99% thrombotic lesion in prox-to-mid LAD s/p successful PCI with SARITHA x 1 (Synergy 3.0 x 38 mm SARITHA, post dilated with 3.5 mm balloon); mild disease in RCA and LCx.  Successful closure of right CFA access with 6F Angioseal.        Plan    Follow bedrest per protocol    Continued medical management and lifestyle modifications for cardiovascular risk factor optimizations.    Arterial sheath removed from femoral artery with closure device.    Femoral angiogram identifies arterial sheath placement suitable for closure device.    Admit to inpatient      1. DAPT with ASA/Ticagrelor x 12 months, ASA monotherapy indefinitely thereafter.  2. High intensity statin  3. Echocardiogram  4. Beta blocker once off pressors and hemodynamically stable  5. Aggressive risk factor modification and medical management of coronary artery disease.  6. Bed rest x 3 hours with femoral access site monitoring as per protocol.     ECHO 1/8/23-Reviewed:   Interpretation Summary  Limited TTE to evaluate for LV function/wall motion.     Left ventricular function is decreased. The ejection fraction is 40-45%  (mildly reduced). There is a pattern of wall motion abnormalities that is  consistent with a LAD culprit infarction.  Global right ventricular function is normal. This study was compared with the  study from 01/07/2023. No significant changes noted.     Physical Examination Review of Systems   /70 (BP Location: Left arm, Patient Position: Sitting, Cuff Size: Adult Regular)   Pulse 84   Resp 14   Wt 81.2 kg (179 lb)   BMI 28.04 kg/m    Body mass index is 28.04 kg/m .  Wt Readings from Last 3 Encounters:   01/31/23 81.2 kg (179 lb)   01/12/23 85 kg (187 lb 8 oz)   01/06/23 87.1 kg (192 lb)     General Appearance:   no distress, normal body habitus   ENT/Mouth: membranes moist, no oral lesions or bleeding gums.       EYES:  no scleral icterus, normal conjunctivae   Neck: no carotid bruits or thyromegaly   Chest/Lungs:   lungs are clear to auscultation, no rales or wheezing, equal chest wall expansion    Cardiovascular:    Heart rate regular. Normal first and second heart sounds with no murmurs, rubs, or gallops; the carotid, radial and posterior tibial pulses are intact, Jugular venous pressure flat, no edema bilaterally    Abdomen:  no organomegaly, masses, bruits, or tenderness; bowel sounds are present   Extremities  Puncture Site: no cyanosis or clubbing  Right radial and Right femoral site sites soft and intact except mild bruising on right wrist .  Radial pulses and Pedal pulses intact and symmetrical.  CMS intact.   Skin: no xanthelasma, warm.    Neurologic: normal  bilateral, no tremors   Psychiatric: alert and oriented x3, calm                                                    Negative unless noted in HPI     Medical History  Surgical History Family History Social History   No past medical history on file. Past Surgical History:   Procedure Laterality Date     CHOLECYSTECTOMY       CV CORONARY ANGIOGRAM N/A 1/6/2023    Procedure: Coronary Angiogram;  Surgeon: Brandon Walker MD;  Location: [facility] CARDIAC CATH LAB     CV CORONARY ANGIOGRAM N/A 1/10/2023    Procedure: Coronary Angiogram;  Surgeon: Brandon Walker MD;  Location: [facility] CARDIAC CATH LAB     CV INTRAVASULAR ULTRASOUND N/A 1/6/2023    Procedure: Intravascular Ultrasound;  Surgeon: Brandon Walker MD;  Location: [facility] CARDIAC CATH LAB     CV PCI STENT DRUG ELUTING N/A 1/6/2023    Procedure: Percutaneous Coronary Intervention Stent;  Surgeon: Brandon Walker MD;  Location: [facility] CARDIAC CATH LAB    No family history on file. Social History     Socioeconomic History     Marital status:      Spouse name: Not on file     Number of children: Not on file     Years of education: Not  on file     Highest education level: Not on file   Occupational History     Not on file   Tobacco Use     Smoking status: Never     Smokeless tobacco: Never   Substance and Sexual Activity     Alcohol use: Yes     Drug use: No     Sexual activity: Not on file   Other Topics Concern     Not on file   Social History Narrative     Not on file     Social Determinants of Health     Financial Resource Strain: Not on file   Food Insecurity: Not on file   Transportation Needs: Not on file   Physical Activity: Not on file   Stress: Not on file   Social Connections: Not on file   Intimate Partner Violence: Not on file   Housing Stability: Not on file          Medications  Allergies   Current Outpatient Medications   Medication Sig Dispense Refill     aspirin 81 mg chewable tablet Take 81 mg by mouth daily       atorvastatin (LIPITOR) 80 MG tablet Take 1 tablet (80 mg) by mouth daily 90 tablet 3     colchicine (COLCYRS) 0.6 MG tablet Take 1 tablet (0.6 mg) by mouth daily 90 tablet 3     famotidine (PEPCID) 20 MG tablet Take 20 mg by mouth 2 times daily       furosemide (LASIX) 40 MG tablet Take 0.5 tablets (20 mg) by mouth daily 90 tablet 3     losartan (COZAAR) 50 MG tablet Take 100 mg by mouth daily       metoprolol succinate ER (TOPROL XL) 25 MG 24 hr tablet Take 1 tablet (25 mg) by mouth At Bedtime 90 tablet 3     multivitamin with minerals tablet Take 1 tablet by mouth daily       nitroGLYcerin (NITROSTAT) 0.4 MG sublingual tablet For chest pain place 1 tablet under the tongue every 5 minutes for 3 doses. If symptoms persist 5 minutes after 1st dose call 911. 30 tablet 1     omeprazole (PRILOSEC) 40 MG DR capsule Take 40 mg by mouth daily       prasugrel (EFFIENT) 10 MG TABS tablet Take 1 tablet (10 mg) by mouth daily 90 tablet 3     sildenafil (VIAGRA) 50 MG tablet [SILDENAFIL (VIAGRA) 50 MG TABLET] Take 1 tab po 1hr prior to sexual activity       sucralfate (CARAFATE) 1 GM tablet Take 1 g by mouth 4 times daily        tamsulosin (FLOMAX) 0.4 MG capsule Take 0.4 mg by mouth daily      Allergies   Allergen Reactions     Amoxicillin Unknown     Unsure, long time ago       Niacin Unknown     Shellfish Containing Products [Shellfish-Derived Products] Unknown     Lisinopril Cough         Lab Results    Chemistry/lipid CBC Cardiac Enzymes/BNP/TSH/INR   Lab Results   Component Value Date    CHOL 100 01/07/2023    CHOL 100 01/07/2023    HDL 44 01/07/2023    HDL 44 01/07/2023    TRIG 43 01/07/2023    TRIG 43 01/07/2023    BUN 24.5 (H) 01/12/2023     01/12/2023    CO2 21 (L) 01/12/2023    Lab Results   Component Value Date    WBC 8.4 01/12/2023    HGB 12.2 (L) 01/12/2023    HCT 36.5 (L) 01/12/2023    MCV 93 01/12/2023     01/12/2023    Lab Results   Component Value Date    TROPONINI <0.01 07/16/2022    TSH 3.23 01/07/2023    INR 1.15 01/06/2023        55 minutes spent on the date of encounter doing chart review, review of outside records, review of test results, interpretation with above tests, patient visit, documentation and discussion with family.        This note has been dictated using voice recognition software. Any grammatical, typographical, or context distortions are unintentional and inherent to the software          Thank you for allowing me to participate in the care of your patient.      Sincerely,     VERONA Salgado [facility]  cc:   Miko Landaverde MD  [address] [place] [id number]

## 2023-01-31 NOTE — PROGRESS NOTES
Assessment/Recommendations   Assessment:    1.  Coronary artery disease: Patient was hospitalized from January 6 through January 12 with STEMI involving anterior wall.  His course was complicated by ventricular fibrillation and cardiac arrest requiring defibrillation with ROSC.    Current angiogram on 1/6/2023 showed severe disease in proximal to mid LAD which was successfully treated with a drug-eluting stent.  Patient continued to remain mildly hypoxic with ongoing chest pain.  Repeat current angiogram on 1/10/2023 showed patent stent.    On dual antiplatelet therapy with ASA 81 mg indefinitely and Prasugrel (Effient) 10 mg daily for 12 months.  Patient reports some chest soreness from compression.  He also occasionally gets discomfort in his epigastric area.  On colchicine twice a day for possible myopericarditis.  He reports loose stools.  Cardiac rehab has been initiated and tolerating well.    Reviewed most recent BMP, Hgb, platelet- stable.    2.  Dyslipidemia with LDL goal <70/Obesity with a BMI of: Jordan Fisher is on high intensity statin therapy with atorvastatin 80 mg daily. Most recent LDL is 45.  Most recent AST/ALT are mildly elevated but he is asymptomatic.    3.  Ischemic cardiomyopathy with mildly reduced ejection fraction LVEF of 40 to 45% NYHA class I:  He is well compensated with no evidence of fluid tension assessment.   On beta-blocker therapy with metoprolol succinate 12.5 mg daily  On ARB therapy with losartan 100 mg daily  On diuretic therapy with furosemide 40 mg daily.     Patient current weight is 173 pounds at home.  He lost approximately 20 pounds since initial hospitalization from diet change.  He follows low-sodium diet.    We discussed and reviewed about heart failure, medication management, and lifestyle management including low sodium diet <2 g/day, daily weight, and staying physically active as tolerated.  Provided heart failure education material and resources.     3.  Hypertension: Blood pressure stable today.    4.  GERD: On omeprazole and famotidine.    5.  Obstructive sleep apnea: Patient is scheduled for sleep evaluation in April.    Plan:  - We discussed the importance of antiplatelet therapy and talking with his cardiologist prior to stopping these medications for any reason.  We discussed about utilization of as needed nitroglycerin.  Patient is also on as needed Viagra.  We discussed about the serious drug interaction between nitroglycerin and Viagra.  Patient was instructed to avoid taking nitroglycerin within 48 hours of taking Viagra.  Also instructed to hold off on Viagra until follow-up with cardiology.    - Encouraged to seek medical attention if recurrent chest pain or shortness of breath.    - Risk factor modification and lifestyle management topics were discussed including managing comorbidities, weight loss, heart healthy diet, exercise and stress reduction.      -  Cardiac rehab as scheduled     -Decrease furosemide from 40 mg to 20 g daily    -Increase metoprolol succinate from 12.5 mg daily to 25 mg daily to improve systolic function which he agreed.  Patient was instructed to call if experience any side effects.  Decrease colchicine from 0.6 mg twice a day to once daily    -Decrease colchicine from 0.6 mg twice a day to once daily    - We discussed a diet low in saturated fat, weight loss, and exercise along with medication for better control of cholesterol.  Highly encouraged to participate in nutrition class in cardiac rehab.    - Continue current hypertension regimen    -We will plan repeat limited echo in 10 weeks    -We will plan repeat fasting lipid profile and AST and ALT in approximately 4 weeks    Follow up with me in 2 weeks for heart failure follow-up.  Establish care with general cardiology in 4 to 6 weeks.     History of Present Illness/Subjective    Mr. Jordan Fisher is a 72 year old male with a past medical history of hypertension,  hyperlipidemia, overweight, and recent hospitalization with STEMI involving anterior wall, V. fib and cardiac arrest requiring defibrillation and ROSC, dyslipidemia, ischemic cardiomyopathy with acute systolic heart failure with mildly reduced ejection fraction, and now post PCI to mid LAD who is seen at [facility] for post coronary intervention and heart failure follow up.     His initial echocardiogram showed mildly reduced LVEF of 45 to 50%.  Repeat limited echo showed LVEF reduced to 40 to 45% with no significant valve abnormalities noted.     Today, Jordan is here accompanied by his wife, Fatuma.  He reports soreness on his chest from compression.  He has been taking Tylenol which helps.  He also feels occasional mild epigastric discomfort that comes in the form of spasm and resolves with no intervention.  He is in cardiac rehab and tolerating exercise.  He denies fatigue, lightheadedness, shortness of breath, dyspnea on exertion, orthopnea, PND, palpitations, chest pain, abdominal fullness/bloating and lower extremity edema.      Coronary Angiogram on 1/10/23: reviewed:  Conclusion  Widely patent stent in prox-to-mid LAD.  Mild non obstructive disease in RCA and LCx (unchanged from prior angiogram on 1/6/2023).  No new obstructive lesions noted.    Chest wall pain secondary to CPR during recent cardiac arrest.      Plan      Follow bedrest per protocol    Continued medical management and lifestyle modifications for cardiovascular risk factor optimizations.    Follow up visit with Nurse Practitioner in 1-2 weeks.    Arterial sheath removed from radial artery with TR band placement.    Cardiac rehabilitation.    Return to the primary inpatient team for further evaluation and managmenet      1. DAPT x at least 12 months  2. High intensity statin     Cor Angio on 1/6/23Conclusion  Anterior STEMI.  Single vessel severe CAD.  99% thrombotic lesion in prox-to-mid LAD s/p successful  PCI with SARITHA x 1 (Synergy 3.0 x 38 mm SARITHA, post dilated with 3.5 mm balloon); mild disease in RCA and LCx.  Successful closure of right CFA access with 6F Angioseal.        Plan    Follow bedrest per protocol    Continued medical management and lifestyle modifications for cardiovascular risk factor optimizations.    Arterial sheath removed from femoral artery with closure device.    Femoral angiogram identifies arterial sheath placement suitable for closure device.    Admit to inpatient      1. DAPT with ASA/Ticagrelor x 12 months, ASA monotherapy indefinitely thereafter.  2. High intensity statin  3. Echocardiogram  4. Beta blocker once off pressors and hemodynamically stable  5. Aggressive risk factor modification and medical management of coronary artery disease.  6. Bed rest x 3 hours with femoral access site monitoring as per protocol.     ECHO 1/8/23-Reviewed:   Interpretation Summary  Limited TTE to evaluate for LV function/wall motion.     Left ventricular function is decreased. The ejection fraction is 40-45%  (mildly reduced). There is a pattern of wall motion abnormalities that is  consistent with a LAD culprit infarction.  Global right ventricular function is normal. This study was compared with the  study from 01/07/2023. No significant changes noted.     Physical Examination Review of Systems   /70 (BP Location: Left arm, Patient Position: Sitting, Cuff Size: Adult Regular)   Pulse 84   Resp 14   Wt 81.2 kg (179 lb)   BMI 28.04 kg/m    Body mass index is 28.04 kg/m .  Wt Readings from Last 3 Encounters:   01/31/23 81.2 kg (179 lb)   01/12/23 85 kg (187 lb 8 oz)   01/06/23 87.1 kg (192 lb)     General Appearance:   no distress, normal body habitus   ENT/Mouth: membranes moist, no oral lesions or bleeding gums.      EYES:  no scleral icterus, normal conjunctivae   Neck: no carotid bruits or thyromegaly   Chest/Lungs:   lungs are clear to auscultation, no rales or wheezing, equal chest wall  expansion    Cardiovascular:    Heart rate regular. Normal first and second heart sounds with no murmurs, rubs, or gallops; the carotid, radial and posterior tibial pulses are intact, Jugular venous pressure flat, no edema bilaterally    Abdomen:  no organomegaly, masses, bruits, or tenderness; bowel sounds are present   Extremities  Puncture Site: no cyanosis or clubbing  Right radial and Right femoral site sites soft and intact except mild bruising on right wrist .  Radial pulses and Pedal pulses intact and symmetrical.  CMS intact.   Skin: no xanthelasma, warm.    Neurologic: normal  bilateral, no tremors   Psychiatric: alert and oriented x3, calm                                                    Negative unless noted in HPI     Medical History  Surgical History Family History Social History   No past medical history on file. Past Surgical History:   Procedure Laterality Date     CHOLECYSTECTOMY       CV CORONARY ANGIOGRAM N/A 1/6/2023    Procedure: Coronary Angiogram;  Surgeon: Brandon Walker MD;  Location: [facility] CARDIAC CATH LAB     CV CORONARY ANGIOGRAM N/A 1/10/2023    Procedure: Coronary Angiogram;  Surgeon: Brandon aWlker MD;  Location: [facility] CARDIAC CATH LAB     CV INTRAVASULAR ULTRASOUND N/A 1/6/2023    Procedure: Intravascular Ultrasound;  Surgeon: Brandon Walker MD;  Location: [facility] CARDIAC CATH LAB     CV PCI STENT DRUG ELUTING N/A 1/6/2023    Procedure: Percutaneous Coronary Intervention Stent;  Surgeon: Brandon Walker MD;  Location: [facility] CARDIAC CATH LAB    No family history on file. Social History     Socioeconomic History     Marital status:      Spouse name: Not on file     Number of children: Not on file     Years of education: Not on file     Highest education level: Not on file   Occupational History     Not on file   Tobacco Use     Smoking status: Never     Smokeless tobacco: Never   Substance and Sexual  Activity     Alcohol use: Yes     Drug use: No     Sexual activity: Not on file   Other Topics Concern     Not on file   Social History Narrative     Not on file     Social Determinants of Health     Financial Resource Strain: Not on file   Food Insecurity: Not on file   Transportation Needs: Not on file   Physical Activity: Not on file   Stress: Not on file   Social Connections: Not on file   Intimate Partner Violence: Not on file   Housing Stability: Not on file          Medications  Allergies   Current Outpatient Medications   Medication Sig Dispense Refill     aspirin 81 mg chewable tablet Take 81 mg by mouth daily       atorvastatin (LIPITOR) 80 MG tablet Take 1 tablet (80 mg) by mouth daily 90 tablet 3     colchicine (COLCYRS) 0.6 MG tablet Take 1 tablet (0.6 mg) by mouth daily 90 tablet 3     famotidine (PEPCID) 20 MG tablet Take 20 mg by mouth 2 times daily       furosemide (LASIX) 40 MG tablet Take 0.5 tablets (20 mg) by mouth daily 90 tablet 3     losartan (COZAAR) 50 MG tablet Take 100 mg by mouth daily       metoprolol succinate ER (TOPROL XL) 25 MG 24 hr tablet Take 1 tablet (25 mg) by mouth At Bedtime 90 tablet 3     multivitamin with minerals tablet Take 1 tablet by mouth daily       nitroGLYcerin (NITROSTAT) 0.4 MG sublingual tablet For chest pain place 1 tablet under the tongue every 5 minutes for 3 doses. If symptoms persist 5 minutes after 1st dose call 911. 30 tablet 1     omeprazole (PRILOSEC) 40 MG DR capsule Take 40 mg by mouth daily       prasugrel (EFFIENT) 10 MG TABS tablet Take 1 tablet (10 mg) by mouth daily 90 tablet 3     sildenafil (VIAGRA) 50 MG tablet [SILDENAFIL (VIAGRA) 50 MG TABLET] Take 1 tab po 1hr prior to sexual activity       sucralfate (CARAFATE) 1 GM tablet Take 1 g by mouth 4 times daily       tamsulosin (FLOMAX) 0.4 MG capsule Take 0.4 mg by mouth daily      Allergies   Allergen Reactions     Amoxicillin Unknown     Unsure, long time ago       Niacin Unknown     Shellfish  Containing Products [Shellfish-Derived Products] Unknown     Lisinopril Cough         Lab Results    Chemistry/lipid CBC Cardiac Enzymes/BNP/TSH/INR   Lab Results   Component Value Date    CHOL 100 01/07/2023    CHOL 100 01/07/2023    HDL 44 01/07/2023    HDL 44 01/07/2023    TRIG 43 01/07/2023    TRIG 43 01/07/2023    BUN 24.5 (H) 01/12/2023     01/12/2023    CO2 21 (L) 01/12/2023    Lab Results   Component Value Date    WBC 8.4 01/12/2023    HGB 12.2 (L) 01/12/2023    HCT 36.5 (L) 01/12/2023    MCV 93 01/12/2023     01/12/2023    Lab Results   Component Value Date    TROPONINI <0.01 07/16/2022    TSH 3.23 01/07/2023    INR 1.15 01/06/2023        55 minutes spent on the date of encounter doing chart review, review of outside records, review of test results, interpretation with above tests, patient visit, documentation and discussion with family.        This note has been dictated using voice recognition software. Any grammatical, typographical, or context distortions are unintentional and inherent to the software

## 2023-02-01 ENCOUNTER — HOSPITAL ENCOUNTER (OUTPATIENT)
Dept: CARDIAC REHAB | Facility: HOSPITAL | Age: 73
Discharge: HOME | End: 2023-02-01
Attending: INTERNAL MEDICINE
Payer: COMMERCIAL

## 2023-02-01 PROCEDURE — 93797 PHYS/QHP OP CAR RHAB WO ECG: CPT

## 2023-02-01 PROCEDURE — 93798 PHYS/QHP OP CAR RHAB W/ECG: CPT

## 2023-02-02 ENCOUNTER — HOSPITAL ENCOUNTER (OUTPATIENT)
Dept: CARDIAC REHAB | Facility: HOSPITAL | Age: 73
Discharge: HOME | End: 2023-02-02
Attending: INTERNAL MEDICINE
Payer: COMMERCIAL

## 2023-02-02 PROCEDURE — 93798 PHYS/QHP OP CAR RHAB W/ECG: CPT

## 2023-02-04 ENCOUNTER — HEALTH MAINTENANCE LETTER (OUTPATIENT)
Age: 73
End: 2023-02-04

## 2023-02-06 ENCOUNTER — HOSPITAL ENCOUNTER (OUTPATIENT)
Dept: CARDIAC REHAB | Facility: HOSPITAL | Age: 73
Discharge: HOME | End: 2023-02-06
Attending: INTERNAL MEDICINE
Payer: COMMERCIAL

## 2023-02-06 PROCEDURE — 93798 PHYS/QHP OP CAR RHAB W/ECG: CPT

## 2023-02-08 ENCOUNTER — HOSPITAL ENCOUNTER (OUTPATIENT)
Dept: CARDIAC REHAB | Facility: HOSPITAL | Age: 73
Discharge: HOME | End: 2023-02-08
Attending: INTERNAL MEDICINE
Payer: COMMERCIAL

## 2023-02-08 PROCEDURE — 93798 PHYS/QHP OP CAR RHAB W/ECG: CPT

## 2023-02-09 ENCOUNTER — HOSPITAL ENCOUNTER (OUTPATIENT)
Dept: CARDIAC REHAB | Facility: HOSPITAL | Age: 73
Discharge: HOME | End: 2023-02-09
Attending: INTERNAL MEDICINE
Payer: COMMERCIAL

## 2023-02-09 PROCEDURE — 93798 PHYS/QHP OP CAR RHAB W/ECG: CPT

## 2023-02-13 ENCOUNTER — HOSPITAL ENCOUNTER (OUTPATIENT)
Dept: CARDIAC REHAB | Facility: HOSPITAL | Age: 73
Discharge: HOME | End: 2023-02-13
Attending: INTERNAL MEDICINE
Payer: COMMERCIAL

## 2023-02-13 PROCEDURE — 93798 PHYS/QHP OP CAR RHAB W/ECG: CPT

## 2023-02-14 ENCOUNTER — OFFICE VISIT (OUTPATIENT)
Dept: CARDIOLOGY | Facility: CLINIC | Age: 73
End: 2023-02-14
Payer: COMMERCIAL

## 2023-02-14 VITALS
WEIGHT: 180 LBS | DIASTOLIC BLOOD PRESSURE: 72 MMHG | HEART RATE: 71 BPM | RESPIRATION RATE: 16 BRPM | HEIGHT: 67 IN | SYSTOLIC BLOOD PRESSURE: 110 MMHG | BODY MASS INDEX: 28.25 KG/M2

## 2023-02-14 DIAGNOSIS — I25.10 CORONARY ARTERY DISEASE INVOLVING NATIVE CORONARY ARTERY OF NATIVE HEART WITHOUT ANGINA PECTORIS: ICD-10-CM

## 2023-02-14 DIAGNOSIS — I10 ESSENTIAL HYPERTENSION: ICD-10-CM

## 2023-02-14 DIAGNOSIS — Z98.890 S/P CORONARY ANGIOGRAM: ICD-10-CM

## 2023-02-14 DIAGNOSIS — I50.20 HFREF (HEART FAILURE WITH REDUCED EJECTION FRACTION) (H): Primary | ICD-10-CM

## 2023-02-14 DIAGNOSIS — I25.5 ISCHEMIC CARDIOMYOPATHY: ICD-10-CM

## 2023-02-14 DIAGNOSIS — E78.5 DYSLIPIDEMIA, GOAL LDL BELOW 70: ICD-10-CM

## 2023-02-14 DIAGNOSIS — G47.33 OSA (OBSTRUCTIVE SLEEP APNEA): ICD-10-CM

## 2023-02-14 PROCEDURE — 99214 OFFICE O/P EST MOD 30 MIN: CPT | Performed by: NURSE PRACTITIONER

## 2023-02-14 RX ORDER — FUROSEMIDE 40 MG
20 TABLET ORAL DAILY PRN
Qty: 90 TABLET | Refills: 3 | Status: SHIPPED | OUTPATIENT
Start: 2023-02-14

## 2023-02-14 RX ORDER — COLCHICINE 0.6 MG/1
0.6 TABLET ORAL DAILY
Qty: 90 TABLET | Refills: 0 | Status: SHIPPED | OUTPATIENT
Start: 2023-02-14 | End: 2023-03-31

## 2023-02-14 NOTE — LETTER
Date:February 16, 2023      Provider requested that no letter be sent. Do not send.       [facility]

## 2023-02-14 NOTE — LETTER
2/14/2023    Wen Mederos MD  [facility] [address]  [facility] [address]    RE: Jordan Fisher       Dear Colleague,     I had the pleasure of seeing Jordan Fisher in the [facility].          Assessment/Recommendations   Assessment:    1.  Ischemic cardiomyopathy with mildly reduced ejection fraction LVEF of 40 to 45% NYHA class II: Patient is well compensated with no evidence of fluid retention on assessment except mild fatigue.    On heart failure regimen includes  On beta-blocker therapy with metoprolol succinate 25 mg daily at bedtime  On ARB therapy with losartan 100 mg daily in a.m.  On diuretic therapy with furosemide 20 mg daily    He reports drinking about 32 ounces of fluid per day.    Current home weight is stable at 173 pounds.    He lost approximately 20 pounds since initial hospitalization from diet change.    He follows heart healthy low-sodium diet.    2. Coronary artery disease with status post STEMI complicated by ventricular fibrillation with an cardiac arrest requiring requiring defibrillation with ROSC: Status post PCI/SARITHA to proximal to mid LAD on 1/6/2023.  Repeat coronary angiogram on 1/10/2020 showed patent stents.      On dual antiplatelet therapy with ASA 81 mg indefinitely and Prasugrel (Effient) 10 mg daily for 12 months.  Denies chest pain or angina.  Continues to have some soreness in his chest from compression.    He is experiencing some fluttering sensation in the epigastric area with some dry cough.  No evidence of fluid retention on exam.  He has history of Ambrose's esophagus and is on famotidine and omeprazole and had procedure done several years ago to correct Ambrose's esophagus.    Reviewed cardiac rehab note and telemetry monitor record-no evidence of heart rhythm disturbance.    Patient is on colchicine 0.6 mg daily for possible myocarditis.  Dose was reduced during last clinic visit due to loose stools which has  improved.    Blood pressure stable at 110/72.  Last few blood pressure readings have been systolic in high 90s 2.  Dyslipidemia with LDL goal <70/Obesity with a BMI of: Jordan Fisher is on high intensity statin therapy with atorvastatin 80 mg daily. Most recent LDL is 45.  Most recent AST/ALT are mildly elevated but he is asymptomatic.     3. Hypertension: Blood pressure systolic running in high 90s for the last few cardiac rehab visits.  He reports some fatigue.    4.  GERD: On omeprazole and famotidine.    5.  Obstructive sleep apnea: Patient is scheduled for sleep evaluation in April.    Plan:  -We will continue on current heart failure medication regimen given lower blood pressure readings in cardiac rehab.    -Instructed to drink about 50 to 60 ounces of fluid per day.  Change to furosemide to 20 mg as needed for weight gain greater than 3 pounds with shortness of breath, abdominal bloating or leg swelling.    -Continue cardiac rehab.    -We will defer to Dr. Saenz, primary cardiologist regarding duration for colchicine    -Recommended fasting lipid profile and AST and ALT in 1 to 2 weeks-order placed    Follow-up with Dr. Saenz as scheduled on 3/31/2023.  Follow-up with me in 3 to 4 months or sooner if needed     History of Present Illness/Subjective    Mr. Jordan Fisher is a 72 year old male with a past medical history of hypertension, hyperlipidemia, overweight, and recent hospitalization with STEMI involving anterior wall, V. fib and cardiac arrest requiring defibrillation and ROSC, dyslipidemia, ischemic cardiomyopathy with acute systolic heart failure with mildly reduced ejection fraction, and now post PCI to mid LAD who is seen at [facility] for for continued heart failure follow-up.    His initial echocardiogram showed mildly reduced LVEF of 45 to 50%.  Repeat limited echo showed LVEF reduced to 40 to 45% with no significant valve abnormalities noted.    During  last clinic visit, patient was experiencing some loose stools and diarrhea.  Colchicine dose was reduced.  His metoprolol dose was increased and furosemide dose was reduced.    Today, Jordan is here accompanied by his wife, Fatuma.  He continues to do well from cardiac standpoint.  He is having some chest soreness from compression.  He is experiencing some fluttery sensation in his epigastric area which he thinks is more due to his issue with Ambrose's esophagus.  He has also been having some dry cough.  His weights been stable.  He is following heart healthy and low-sodium diet.  He reports mild fatigue.  He denies lightheadedness, shortness of breath, dyspnea on exertion, orthopnea, PND, palpitations, chest pain, abdominal fullness/bloating and lower extremity edema.      Coronary Angiogram on 1/10/23: reviewed:  Conclusion  Widely patent stent in prox-to-mid LAD.  Mild non obstructive disease in RCA and LCx (unchanged from prior angiogram on 1/6/2023).  No new obstructive lesions noted.    Chest wall pain secondary to CPR during recent cardiac arrest.      Plan      Follow bedrest per protocol    Continued medical management and lifestyle modifications for cardiovascular risk factor optimizations.    Follow up visit with Nurse Practitioner in 1-2 weeks.    Arterial sheath removed from radial artery with TR band placement.    Cardiac rehabilitation.    Return to the primary inpatient team for further evaluation and managmenet      1. DAPT x at least 12 months  2. High intensity statin     Cor Angio on 1/6/23Conclusion  Anterior STEMI.  Single vessel severe CAD.  99% thrombotic lesion in prox-to-mid LAD s/p successful PCI with SARITHA x 1 (Synergy 3.0 x 38 mm SARITHA, post dilated with 3.5 mm balloon); mild disease in RCA and LCx.  Successful closure of right CFA access with 6F Angioseal.        Plan    Follow bedrest per protocol    Continued medical management and lifestyle modifications for cardiovascular risk factor  "optimizations.    Arterial sheath removed from femoral artery with closure device.    Femoral angiogram identifies arterial sheath placement suitable for closure device.    Admit to inpatient      1. DAPT with ASA/Ticagrelor x 12 months, ASA monotherapy indefinitely thereafter.  2. High intensity statin  3. Echocardiogram  4. Beta blocker once off pressors and hemodynamically stable  5. Aggressive risk factor modification and medical management of coronary artery disease.  6. Bed rest x 3 hours with femoral access site monitoring as per protocol.     ECHO 1/8/23-Reviewed:   Interpretation Summary  Limited TTE to evaluate for LV function/wall motion.     Left ventricular function is decreased. The ejection fraction is 40-45%  (mildly reduced). There is a pattern of wall motion abnormalities that is  consistent with a LAD culprit infarction.  Global right ventricular function is normal. This study was compared with the  study from 01/07/2023. No significant changes noted.     Physical Examination Review of Systems   /72 (BP Location: Left arm, Patient Position: Sitting, Cuff Size: Adult Regular)   Pulse 71   Resp 16   Ht 1.702 m (5' 7\")   Wt 81.6 kg (180 lb)   BMI 28.19 kg/m    Body mass index is 28.19 kg/m .  Wt Readings from Last 3 Encounters:   02/14/23 81.6 kg (180 lb)   01/31/23 81.2 kg (179 lb)   01/12/23 85 kg (187 lb 8 oz)     General Appearance:   no distress, normal body habitus   ENT/Mouth: membranes moist, no oral lesions or bleeding gums.      EYES:  no scleral icterus, normal conjunctivae   Neck: no carotid bruits or thyromegaly   Chest/Lungs:   lungs are clear to auscultation, no rales or wheezing, equal chest wall expansion    Cardiovascular:    Heart rate regular. Normal first and second heart sounds with no murmurs, rubs, or gallops; the carotid, radial and posterior tibial pulses are intact, Jugular venous pressure flat, no edema bilaterally    Abdomen:  no organomegaly, masses, bruits, " or tenderness; bowel sounds are present   Extremities   no cyanosis or clubbing    Radial pulses and Pedal pulses intact and symmetrical.  CMS intact.   Skin: no xanthelasma, warm.    Neurologic: normal  bilateral, no tremors   Psychiatric: alert and oriented x3, calm                                                    Negative unless noted in HPI     Medical History  Surgical History Family History Social History   No past medical history on file. Past Surgical History:   Procedure Laterality Date     CHOLECYSTECTOMY       CV CORONARY ANGIOGRAM N/A 1/6/2023    Procedure: Coronary Angiogram;  Surgeon: Brandon Walker MD;  Location: [facility] CARDIAC CATH LAB     CV CORONARY ANGIOGRAM N/A 1/10/2023    Procedure: Coronary Angiogram;  Surgeon: Brandon Walker MD;  Location: [facility] CARDIAC CATH LAB     CV INTRAVASULAR ULTRASOUND N/A 1/6/2023    Procedure: Intravascular Ultrasound;  Surgeon: Brandon Walker MD;  Location: [facility] CARDIAC CATH LAB     CV PCI STENT DRUG ELUTING N/A 1/6/2023    Procedure: Percutaneous Coronary Intervention Stent;  Surgeon: Brandon Walker MD;  Location: [facility] CARDIAC CATH LAB    No family history on file. Social History     Socioeconomic History     Marital status:      Spouse name: Not on file     Number of children: Not on file     Years of education: Not on file     Highest education level: Not on file   Occupational History     Not on file   Tobacco Use     Smoking status: Never     Smokeless tobacco: Never   Substance and Sexual Activity     Alcohol use: Yes     Drug use: No     Sexual activity: Not on file   Other Topics Concern     Not on file   Social History Narrative     Not on file     Social Determinants of Health     Financial Resource Strain: Not on file   Food Insecurity: Not on file   Transportation Needs: Not on file   Physical Activity: Not on file   Stress: Not on file   Social Connections: Not on  file   Intimate Partner Violence: Not on file   Housing Stability: Not on file          Medications  Allergies   Current Outpatient Medications   Medication Sig Dispense Refill     aspirin 81 mg chewable tablet Take 81 mg by mouth daily       atorvastatin (LIPITOR) 80 MG tablet Take 1 tablet (80 mg) by mouth daily (Patient taking differently: Take 80 mg by mouth daily) 90 tablet 3     colchicine (COLCYRS) 0.6 MG tablet Take 1 tablet (0.6 mg) by mouth daily 90 tablet 0     famotidine (PEPCID) 20 MG tablet Take 20 mg by mouth 2 times daily       furosemide (LASIX) 40 MG tablet Take 0.5 tablets (20 mg) by mouth daily as needed (take 1 tablet as needed for weight gain >3 lbs with leg swelling, abdominal bloating or shortness of breath) 90 tablet 3     losartan (COZAAR) 50 MG tablet Take 100 mg by mouth daily       metoprolol succinate ER (TOPROL XL) 25 MG 24 hr tablet Take 1 tablet (25 mg) by mouth At Bedtime (Patient taking differently: Take 25 mg by mouth At Bedtime) 90 tablet 3     multivitamin with minerals tablet Take 1 tablet by mouth daily       Omeprazole 20 MG TBDD Take 20 mg by mouth 2 times daily       prasugrel (EFFIENT) 10 MG TABS tablet Take 1 tablet (10 mg) by mouth daily (Patient taking differently: Take 10 mg by mouth daily) 90 tablet 3     sildenafil (VIAGRA) 50 MG tablet [SILDENAFIL (VIAGRA) 50 MG TABLET] Take 1 tab po 1hr prior to sexual activity       sucralfate (CARAFATE) 1 GM tablet Take 1 g by mouth 4 times daily       tamsulosin (FLOMAX) 0.4 MG capsule Take 0.4 mg by mouth daily       nitroGLYcerin (NITROSTAT) 0.4 MG sublingual tablet For chest pain place 1 tablet under the tongue every 5 minutes for 3 doses. If symptoms persist 5 minutes after 1st dose call 911. (Patient not taking: Reported on 2/14/2023) 30 tablet 1    Allergies   Allergen Reactions     Amoxicillin Unknown     Unsure, long time ago       Niacin Unknown     Shellfish Containing Products [Shellfish-Derived Products] Unknown      Lisinopril Cough         Lab Results    Chemistry/lipid CBC Cardiac Enzymes/BNP/TSH/INR   Lab Results   Component Value Date    CHOL 100 01/07/2023    CHOL 100 01/07/2023    HDL 44 01/07/2023    HDL 44 01/07/2023    TRIG 43 01/07/2023    TRIG 43 01/07/2023    BUN 24.5 (H) 01/12/2023     01/12/2023    CO2 21 (L) 01/12/2023    Lab Results   Component Value Date    WBC 8.4 01/12/2023    HGB 12.2 (L) 01/12/2023    HCT 36.5 (L) 01/12/2023    MCV 93 01/12/2023     01/12/2023    Lab Results   Component Value Date    TROPONINI <0.01 07/16/2022    TSH 3.23 01/07/2023    INR 1.15 01/06/2023        39 minutes spent on the date of encounter doing chart review, review of test results, interpretation with above tests, patient visit, documentation and discussion with family.        This note has been dictated using voice recognition software. Any grammatical, typographical, or context distortions are unintentional and inherent to the software          Thank you for allowing me to participate in the care of your patient.      Sincerely,     VERONA Salgado CNP     [facility]  cc:   VERONA Salgado CNP  [address]  [place] [address]

## 2023-02-14 NOTE — PROGRESS NOTES
Assessment/Recommendations   Assessment:    1.  Ischemic cardiomyopathy with mildly reduced ejection fraction LVEF of 40 to 45% NYHA class II: Patient is well compensated with no evidence of fluid retention on assessment except mild fatigue.    On heart failure regimen includes  On beta-blocker therapy with metoprolol succinate 25 mg daily at bedtime  On ARB therapy with losartan 100 mg daily in a.m.  On diuretic therapy with furosemide 20 mg daily    He reports drinking about 32 ounces of fluid per day.    Current home weight is stable at 173 pounds.    He lost approximately 20 pounds since initial hospitalization from diet change.    He follows heart healthy low-sodium diet.    2. Coronary artery disease with status post STEMI complicated by ventricular fibrillation with an cardiac arrest requiring requiring defibrillation with ROSC: Status post PCI/SARITHA to proximal to mid LAD on 1/6/2023.  Repeat coronary angiogram on 1/10/2020 showed patent stents.      On dual antiplatelet therapy with ASA 81 mg indefinitely and Prasugrel (Effient) 10 mg daily for 12 months.  Denies chest pain or angina.  Continues to have some soreness in his chest from compression.    He is experiencing some fluttering sensation in the epigastric area with some dry cough.  No evidence of fluid retention on exam.  He has history of Ambrose's esophagus and is on famotidine and omeprazole and had procedure done several years ago to correct Ambrose's esophagus.    Reviewed cardiac rehab note and telemetry monitor record-no evidence of heart rhythm disturbance.    Patient is on colchicine 0.6 mg daily for possible myocarditis.  Dose was reduced during last clinic visit due to loose stools which has improved.    Blood pressure stable at 110/72.  Last few blood pressure readings have been systolic in high 90s 2.  Dyslipidemia with LDL goal <70/Obesity with a BMI of: Jordan Fisher is on high intensity statin therapy with atorvastatin 80 mg  daily. Most recent LDL is 45.  Most recent AST/ALT are mildly elevated but he is asymptomatic.     3. Hypertension: Blood pressure systolic running in high 90s for the last few cardiac rehab visits.  He reports some fatigue.    4.  GERD: On omeprazole and famotidine.    5.  Obstructive sleep apnea: Patient is scheduled for sleep evaluation in April.    Plan:  -We will continue on current heart failure medication regimen given lower blood pressure readings in cardiac rehab.    -Instructed to drink about 50 to 60 ounces of fluid per day.  Change to furosemide to 20 mg as needed for weight gain greater than 3 pounds with shortness of breath, abdominal bloating or leg swelling.    -Continue cardiac rehab.    -We will defer to Dr. Saenz, primary cardiologist regarding duration for colchicine    -Recommended fasting lipid profile and AST and ALT in 1 to 2 weeks-order placed    Follow-up with Dr. Saenz as scheduled on 3/31/2023.  Follow-up with me in 3 to 4 months or sooner if needed     History of Present Illness/Subjective    Mr. Jordan Fisher is a 72 year old male with a past medical history of hypertension, hyperlipidemia, overweight, and recent hospitalization with STEMI involving anterior wall, V. fib and cardiac arrest requiring defibrillation and ROSC, dyslipidemia, ischemic cardiomyopathy with acute systolic heart failure with mildly reduced ejection fraction, and now post PCI to mid LAD who is seen at [facility] for for continued heart failure follow-up.    His initial echocardiogram showed mildly reduced LVEF of 45 to 50%.  Repeat limited echo showed LVEF reduced to 40 to 45% with no significant valve abnormalities noted.    During last clinic visit, patient was experiencing some loose stools and diarrhea.  Colchicine dose was reduced.  His metoprolol dose was increased and furosemide dose was reduced.    Today, Jordan is here accompanied by his wife, Fatuma.  He continues  to do well from cardiac standpoint.  He is having some chest soreness from compression.  He is experiencing some fluttery sensation in his epigastric area which he thinks is more due to his issue with Ambrose's esophagus.  He has also been having some dry cough.  His weights been stable.  He is following heart healthy and low-sodium diet.  He reports mild fatigue.  He denies lightheadedness, shortness of breath, dyspnea on exertion, orthopnea, PND, palpitations, chest pain, abdominal fullness/bloating and lower extremity edema.      Coronary Angiogram on 1/10/23: reviewed:  Conclusion  Widely patent stent in prox-to-mid LAD.  Mild non obstructive disease in RCA and LCx (unchanged from prior angiogram on 1/6/2023).  No new obstructive lesions noted.    Chest wall pain secondary to CPR during recent cardiac arrest.      Plan      Follow bedrest per protocol    Continued medical management and lifestyle modifications for cardiovascular risk factor optimizations.    Follow up visit with Nurse Practitioner in 1-2 weeks.    Arterial sheath removed from radial artery with TR band placement.    Cardiac rehabilitation.    Return to the primary inpatient team for further evaluation and managmenet      1. DAPT x at least 12 months  2. High intensity statin     Cor Angio on 1/6/23Conclusion  Anterior STEMI.  Single vessel severe CAD.  99% thrombotic lesion in prox-to-mid LAD s/p successful PCI with SARITHA x 1 (Synergy 3.0 x 38 mm SARITHA, post dilated with 3.5 mm balloon); mild disease in RCA and LCx.  Successful closure of right CFA access with 6F Angioseal.        Plan    Follow bedrest per protocol    Continued medical management and lifestyle modifications for cardiovascular risk factor optimizations.    Arterial sheath removed from femoral artery with closure device.    Femoral angiogram identifies arterial sheath placement suitable for closure device.    Admit to inpatient      1. DAPT with ASA/Ticagrelor x 12 months, ASA  "monotherapy indefinitely thereafter.  2. High intensity statin  3. Echocardiogram  4. Beta blocker once off pressors and hemodynamically stable  5. Aggressive risk factor modification and medical management of coronary artery disease.  6. Bed rest x 3 hours with femoral access site monitoring as per protocol.     ECHO 1/8/23-Reviewed:   Interpretation Summary  Limited TTE to evaluate for LV function/wall motion.     Left ventricular function is decreased. The ejection fraction is 40-45%  (mildly reduced). There is a pattern of wall motion abnormalities that is  consistent with a LAD culprit infarction.  Global right ventricular function is normal. This study was compared with the  study from 01/07/2023. No significant changes noted.     Physical Examination Review of Systems   /72 (BP Location: Left arm, Patient Position: Sitting, Cuff Size: Adult Regular)   Pulse 71   Resp 16   Ht 1.702 m (5' 7\")   Wt 81.6 kg (180 lb)   BMI 28.19 kg/m    Body mass index is 28.19 kg/m .  Wt Readings from Last 3 Encounters:   02/14/23 81.6 kg (180 lb)   01/31/23 81.2 kg (179 lb)   01/12/23 85 kg (187 lb 8 oz)     General Appearance:   no distress, normal body habitus   ENT/Mouth: membranes moist, no oral lesions or bleeding gums.      EYES:  no scleral icterus, normal conjunctivae   Neck: no carotid bruits or thyromegaly   Chest/Lungs:   lungs are clear to auscultation, no rales or wheezing, equal chest wall expansion    Cardiovascular:    Heart rate regular. Normal first and second heart sounds with no murmurs, rubs, or gallops; the carotid, radial and posterior tibial pulses are intact, Jugular venous pressure flat, no edema bilaterally    Abdomen:  no organomegaly, masses, bruits, or tenderness; bowel sounds are present   Extremities   no cyanosis or clubbing    Radial pulses and Pedal pulses intact and symmetrical.  CMS intact.   Skin: no xanthelasma, warm.    Neurologic: normal  bilateral, no tremors "   Psychiatric: alert and oriented x3, calm                                                    Negative unless noted in HPI     Medical History  Surgical History Family History Social History   No past medical history on file. Past Surgical History:   Procedure Laterality Date     CHOLECYSTECTOMY       CV CORONARY ANGIOGRAM N/A 1/6/2023    Procedure: Coronary Angiogram;  Surgeon: Brandon Walker MD;  Location: [facility] CARDIAC CATH LAB     CV CORONARY ANGIOGRAM N/A 1/10/2023    Procedure: Coronary Angiogram;  Surgeon: Brandon Walker MD;  Location: [facility] CARDIAC CATH LAB     CV INTRAVASULAR ULTRASOUND N/A 1/6/2023    Procedure: Intravascular Ultrasound;  Surgeon: Brandon Walker MD;  Location: [facility] CARDIAC CATH LAB     CV PCI STENT DRUG ELUTING N/A 1/6/2023    Procedure: Percutaneous Coronary Intervention Stent;  Surgeon: Brandon Walker MD;  Location: [facility] CARDIAC CATH LAB    No family history on file. Social History     Socioeconomic History     Marital status:      Spouse name: Not on file     Number of children: Not on file     Years of education: Not on file     Highest education level: Not on file   Occupational History     Not on file   Tobacco Use     Smoking status: Never     Smokeless tobacco: Never   Substance and Sexual Activity     Alcohol use: Yes     Drug use: No     Sexual activity: Not on file   Other Topics Concern     Not on file   Social History Narrative     Not on file     Social Determinants of Health     Financial Resource Strain: Not on file   Food Insecurity: Not on file   Transportation Needs: Not on file   Physical Activity: Not on file   Stress: Not on file   Social Connections: Not on file   Intimate Partner Violence: Not on file   Housing Stability: Not on file          Medications  Allergies   Current Outpatient Medications   Medication Sig Dispense Refill     aspirin 81 mg chewable tablet Take 81 mg by mouth  daily       atorvastatin (LIPITOR) 80 MG tablet Take 1 tablet (80 mg) by mouth daily (Patient taking differently: Take 80 mg by mouth daily) 90 tablet 3     colchicine (COLCYRS) 0.6 MG tablet Take 1 tablet (0.6 mg) by mouth daily 90 tablet 0     famotidine (PEPCID) 20 MG tablet Take 20 mg by mouth 2 times daily       furosemide (LASIX) 40 MG tablet Take 0.5 tablets (20 mg) by mouth daily as needed (take 1 tablet as needed for weight gain >3 lbs with leg swelling, abdominal bloating or shortness of breath) 90 tablet 3     losartan (COZAAR) 50 MG tablet Take 100 mg by mouth daily       metoprolol succinate ER (TOPROL XL) 25 MG 24 hr tablet Take 1 tablet (25 mg) by mouth At Bedtime (Patient taking differently: Take 25 mg by mouth At Bedtime) 90 tablet 3     multivitamin with minerals tablet Take 1 tablet by mouth daily       Omeprazole 20 MG TBDD Take 20 mg by mouth 2 times daily       prasugrel (EFFIENT) 10 MG TABS tablet Take 1 tablet (10 mg) by mouth daily (Patient taking differently: Take 10 mg by mouth daily) 90 tablet 3     sildenafil (VIAGRA) 50 MG tablet [SILDENAFIL (VIAGRA) 50 MG TABLET] Take 1 tab po 1hr prior to sexual activity       sucralfate (CARAFATE) 1 GM tablet Take 1 g by mouth 4 times daily       tamsulosin (FLOMAX) 0.4 MG capsule Take 0.4 mg by mouth daily       nitroGLYcerin (NITROSTAT) 0.4 MG sublingual tablet For chest pain place 1 tablet under the tongue every 5 minutes for 3 doses. If symptoms persist 5 minutes after 1st dose call 911. (Patient not taking: Reported on 2/14/2023) 30 tablet 1    Allergies   Allergen Reactions     Amoxicillin Unknown     Unsure, long time ago       Niacin Unknown     Shellfish Containing Products [Shellfish-Derived Products] Unknown     Lisinopril Cough         Lab Results    Chemistry/lipid CBC Cardiac Enzymes/BNP/TSH/INR   Lab Results   Component Value Date    CHOL 100 01/07/2023    CHOL 100 01/07/2023    HDL 44 01/07/2023    HDL 44 01/07/2023    TRIG 43  01/07/2023    TRIG 43 01/07/2023    BUN 24.5 (H) 01/12/2023     01/12/2023    CO2 21 (L) 01/12/2023    Lab Results   Component Value Date    WBC 8.4 01/12/2023    HGB 12.2 (L) 01/12/2023    HCT 36.5 (L) 01/12/2023    MCV 93 01/12/2023     01/12/2023    Lab Results   Component Value Date    TROPONINI <0.01 07/16/2022    TSH 3.23 01/07/2023    INR 1.15 01/06/2023        39 minutes spent on the date of encounter doing chart review, review of test results, interpretation with above tests, patient visit, documentation and discussion with family.        This note has been dictated using voice recognition software. Any grammatical, typographical, or context distortions are unintentional and inherent to the software

## 2023-02-14 NOTE — PATIENT INSTRUCTIONS
Jordan Fisher,    It was a pleasure to see you today at the [facility].     My recommendations after this visit include:    - Change furosemide 20 mg from daily to only as needed for weight gain >3 lbs with shortness of breath, leg swelling or abdominal bloating    - Please get your fasting lipid profile and liver enzymes (AST/ALT) in 1-2 weeks- order placed. Make sure to fast for 8-12 hours (okay to have plain water , coffee or tea without added flavor).    - Follow up with Dr. Saenz as scheduled on 3/31/23    - Follow up with primary provider as scheduled     - Lauri in 3-4 months in heart failure clinic     - Please call Heart Failure Nurse Line at [phone], if you have any questions or concerns    Lauri Rico CNP       What is the [facility]?     The [facility] is aheart failure specialty clinic within [facility].  You will work with your cardiologist, nurse practitioner, and nurses to carefully adjust medications and learn how to live with heart failure.  The Heart FailureProgram will help you:    Better understand your chronic heart condition  Feel better and avoid hospital stays    Monitoring for Symptoms      Call the Heart Failure Phone Line ([phone]) if you have any of these symptoms:   Increased shortness of breath/shortness ofbreath at rest  Waking up at night with difficulty breathing  Unable to lie down for sleep due to symptoms or needing to sit uprightfor sleep  Weight gain of 2 pounds a day for 2 days in a row OR 5 pounds in 1 week  Increased swelling in your ankles or legs  Dizziness or lightheadedness    Medications     Take your medications as prescribed  Bring all your medications in their original bottles to every appointment  Avoid non-steroidal anti-inflammatory medications (Advil,Aleve, Ibuprofen, Naprosyn, Naproxen, Celebrex)  Do not stop taking your medications or begin taking over-the-counter or  herbal medications without first talking to your doctor ornurse practitioner    Diet and Lifestyle     Limit sodium/salt to 2000 mg daily   Read food labels for sodium content  Do not add salt when cooking or add salt at the table  Weigh yourself every day and record in your daily weight log   Call if you gain 2 pounds a day for 2 days in a row OR 5 pounds in 1 week  Bring daily weight log to every appointment  Stay active, pace yourself, listen to yourbody, and rest when tired  Elevate your legs if they are swollen. Ask about using compression/support stockings  Stop smoking  Lose weight if you are overweight  Avoid drinking alcohol or limit amount  Stay updated on your immunizations including flu and pneumonia vaccines

## 2023-02-15 ENCOUNTER — HOSPITAL ENCOUNTER (OUTPATIENT)
Dept: CARDIAC REHAB | Facility: HOSPITAL | Age: 73
Discharge: HOME | End: 2023-02-15
Attending: INTERNAL MEDICINE
Payer: COMMERCIAL

## 2023-02-15 PROCEDURE — 93798 PHYS/QHP OP CAR RHAB W/ECG: CPT

## 2023-02-16 ENCOUNTER — HOSPITAL ENCOUNTER (OUTPATIENT)
Dept: CARDIAC REHAB | Facility: HOSPITAL | Age: 73
Discharge: HOME | End: 2023-02-16
Attending: INTERNAL MEDICINE
Payer: COMMERCIAL

## 2023-02-16 PROCEDURE — 93798 PHYS/QHP OP CAR RHAB W/ECG: CPT

## 2023-02-20 ENCOUNTER — HOSPITAL ENCOUNTER (OUTPATIENT)
Dept: CARDIAC REHAB | Facility: HOSPITAL | Age: 73
Discharge: HOME | End: 2023-02-20
Attending: INTERNAL MEDICINE
Payer: COMMERCIAL

## 2023-02-20 PROCEDURE — 93798 PHYS/QHP OP CAR RHAB W/ECG: CPT

## 2023-02-27 ENCOUNTER — HOSPITAL ENCOUNTER (OUTPATIENT)
Dept: CARDIAC REHAB | Facility: HOSPITAL | Age: 73
Discharge: HOME | End: 2023-02-27
Attending: INTERNAL MEDICINE
Payer: COMMERCIAL

## 2023-02-27 PROCEDURE — 93798 PHYS/QHP OP CAR RHAB W/ECG: CPT

## 2023-03-01 ENCOUNTER — HOSPITAL ENCOUNTER (OUTPATIENT)
Dept: CARDIAC REHAB | Facility: HOSPITAL | Age: 73
Discharge: HOME | End: 2023-03-01
Attending: INTERNAL MEDICINE
Payer: COMMERCIAL

## 2023-03-01 PROCEDURE — 93798 PHYS/QHP OP CAR RHAB W/ECG: CPT

## 2023-03-02 ENCOUNTER — HOSPITAL ENCOUNTER (OUTPATIENT)
Dept: CARDIAC REHAB | Facility: HOSPITAL | Age: 73
Discharge: HOME | End: 2023-03-02
Attending: INTERNAL MEDICINE
Payer: COMMERCIAL

## 2023-03-02 ENCOUNTER — LAB (OUTPATIENT)
Dept: LAB | Facility: HOSPITAL | Age: 73
End: 2023-03-02
Payer: COMMERCIAL

## 2023-03-02 DIAGNOSIS — E78.5 DYSLIPIDEMIA, GOAL LDL BELOW 70: ICD-10-CM

## 2023-03-02 LAB
ALT SERPL W P-5'-P-CCNC: 20 U/L (ref 10–50)
AST SERPL W P-5'-P-CCNC: 23 U/L (ref 10–50)
CHOLEST SERPL-MCNC: 100 MG/DL
HDLC SERPL-MCNC: 42 MG/DL
LDLC SERPL CALC-MCNC: 39 MG/DL
NONHDLC SERPL-MCNC: 58 MG/DL
TRIGL SERPL-MCNC: 94 MG/DL

## 2023-03-02 PROCEDURE — 80061 LIPID PANEL: CPT

## 2023-03-02 PROCEDURE — 93798 PHYS/QHP OP CAR RHAB W/ECG: CPT

## 2023-03-02 PROCEDURE — 36415 COLL VENOUS BLD VENIPUNCTURE: CPT

## 2023-03-02 PROCEDURE — 84450 TRANSFERASE (AST) (SGOT): CPT

## 2023-03-02 PROCEDURE — 84460 ALANINE AMINO (ALT) (SGPT): CPT

## 2023-03-06 ENCOUNTER — HOSPITAL ENCOUNTER (OUTPATIENT)
Dept: CARDIAC REHAB | Facility: HOSPITAL | Age: 73
Discharge: HOME | End: 2023-03-06
Attending: INTERNAL MEDICINE
Payer: COMMERCIAL

## 2023-03-06 PROCEDURE — 93798 PHYS/QHP OP CAR RHAB W/ECG: CPT

## 2023-03-08 ENCOUNTER — HOSPITAL ENCOUNTER (OUTPATIENT)
Dept: CARDIAC REHAB | Facility: HOSPITAL | Age: 73
Discharge: HOME | End: 2023-03-08
Attending: INTERNAL MEDICINE
Payer: COMMERCIAL

## 2023-03-08 PROCEDURE — 93798 PHYS/QHP OP CAR RHAB W/ECG: CPT

## 2023-03-09 ENCOUNTER — HOSPITAL ENCOUNTER (OUTPATIENT)
Dept: CARDIAC REHAB | Facility: HOSPITAL | Age: 73
Discharge: HOME | End: 2023-03-09
Attending: INTERNAL MEDICINE
Payer: COMMERCIAL

## 2023-03-09 PROCEDURE — 93798 PHYS/QHP OP CAR RHAB W/ECG: CPT

## 2023-03-13 ENCOUNTER — HOSPITAL ENCOUNTER (OUTPATIENT)
Dept: CARDIAC REHAB | Facility: HOSPITAL | Age: 73
Discharge: HOME | End: 2023-03-13
Attending: INTERNAL MEDICINE
Payer: COMMERCIAL

## 2023-03-13 PROCEDURE — 93798 PHYS/QHP OP CAR RHAB W/ECG: CPT

## 2023-03-15 ENCOUNTER — HOSPITAL ENCOUNTER (OUTPATIENT)
Dept: CARDIAC REHAB | Facility: HOSPITAL | Age: 73
Discharge: HOME | End: 2023-03-15
Attending: INTERNAL MEDICINE
Payer: COMMERCIAL

## 2023-03-15 PROCEDURE — 93798 PHYS/QHP OP CAR RHAB W/ECG: CPT

## 2023-03-16 ENCOUNTER — HOSPITAL ENCOUNTER (OUTPATIENT)
Dept: CARDIAC REHAB | Facility: HOSPITAL | Age: 73
Discharge: HOME | End: 2023-03-16
Attending: INTERNAL MEDICINE
Payer: COMMERCIAL

## 2023-03-16 PROCEDURE — 93798 PHYS/QHP OP CAR RHAB W/ECG: CPT

## 2023-03-20 ENCOUNTER — HOSPITAL ENCOUNTER (OUTPATIENT)
Dept: CARDIAC REHAB | Facility: HOSPITAL | Age: 73
Discharge: HOME | End: 2023-03-20
Attending: INTERNAL MEDICINE
Payer: COMMERCIAL

## 2023-03-20 PROCEDURE — 93798 PHYS/QHP OP CAR RHAB W/ECG: CPT

## 2023-03-27 ENCOUNTER — HOSPITAL ENCOUNTER (OUTPATIENT)
Dept: CARDIAC REHAB | Facility: HOSPITAL | Age: 73
Discharge: HOME | End: 2023-03-27
Attending: INTERNAL MEDICINE
Payer: COMMERCIAL

## 2023-03-27 PROCEDURE — 93798 PHYS/QHP OP CAR RHAB W/ECG: CPT

## 2023-03-29 ENCOUNTER — HOSPITAL ENCOUNTER (OUTPATIENT)
Dept: CARDIAC REHAB | Facility: HOSPITAL | Age: 73
Discharge: HOME | End: 2023-03-29
Attending: INTERNAL MEDICINE
Payer: COMMERCIAL

## 2023-03-29 PROCEDURE — 93798 PHYS/QHP OP CAR RHAB W/ECG: CPT

## 2023-03-30 ENCOUNTER — HOSPITAL ENCOUNTER (OUTPATIENT)
Dept: CARDIAC REHAB | Facility: HOSPITAL | Age: 73
Discharge: HOME | End: 2023-03-30
Attending: INTERNAL MEDICINE
Payer: COMMERCIAL

## 2023-03-30 PROCEDURE — 93798 PHYS/QHP OP CAR RHAB W/ECG: CPT

## 2023-03-31 ENCOUNTER — OFFICE VISIT (OUTPATIENT)
Dept: CARDIOLOGY | Facility: CLINIC | Age: 73
End: 2023-03-31
Payer: COMMERCIAL

## 2023-03-31 VITALS
SYSTOLIC BLOOD PRESSURE: 131 MMHG | WEIGHT: 174 LBS | OXYGEN SATURATION: 99 % | BODY MASS INDEX: 27.25 KG/M2 | HEART RATE: 57 BPM | RESPIRATION RATE: 18 BRPM | DIASTOLIC BLOOD PRESSURE: 73 MMHG

## 2023-03-31 DIAGNOSIS — I21.02 ST ELEVATION MYOCARDIAL INFARCTION INVOLVING LEFT ANTERIOR DESCENDING (LAD) CORONARY ARTERY (H): ICD-10-CM

## 2023-03-31 DIAGNOSIS — I49.01 VENTRICULAR FIBRILLATION (H): ICD-10-CM

## 2023-03-31 DIAGNOSIS — G47.33 OSA (OBSTRUCTIVE SLEEP APNEA): ICD-10-CM

## 2023-03-31 DIAGNOSIS — I10 ESSENTIAL (PRIMARY) HYPERTENSION: ICD-10-CM

## 2023-03-31 DIAGNOSIS — E78.5 DYSLIPIDEMIA, GOAL LDL BELOW 70: ICD-10-CM

## 2023-03-31 DIAGNOSIS — I21.02 ACUTE ST ELEVATION MYOCARDIAL INFARCTION (STEMI) INVOLVING LEFT ANTERIOR DESCENDING (LAD) CORONARY ARTERY (H): ICD-10-CM

## 2023-03-31 DIAGNOSIS — Z98.890 S/P CORONARY ANGIOGRAM: ICD-10-CM

## 2023-03-31 PROCEDURE — 99214 OFFICE O/P EST MOD 30 MIN: CPT | Performed by: INTERNAL MEDICINE

## 2023-03-31 NOTE — PROGRESS NOTES
HEART CARE ENCOUNTER CONSULTATON NOTE      [facility]  [phone]      Assessment/Recommendations   Assessment:  1.  Anterior STEMI complicated by VF arrest: Status post PCI of mid LAD on 1/6/2023 with residual mild disease.  2.  Ischemic cardiomyopathy LVEF 40-45%  3.  Chronic heart failure with reduced ejection fraction: Appears well compensated  4.  Hypertension: Well-controlled  5.  Dyslipidemia: Recent lipids reviewed and are very well controlled    Plan:  1.  Dual antiplatelet therapy with aspirin and Effient for 1 year and then may stop the Effient and continue on aspirin indefinitely.  2.  High-dose statin therapy  3.  Continue on current doses of Toprol and losartan  4.  May stop the colchicine as it may be causing his GI issues  5.  Follow-up in 3 months       History of Present Illness/Subjective    HPI: Jordan Fisher is a 72 year old male with history of anterior STEMI with VT fib arrest status post PCI to mid LAD on 1/6/2023, ischemic cardiomyopathy EF 40-45%, chronic heart failure with reduced ejection fraction, hypertension, dyslipidemia who I am seeing today to establish care.  He started feeling very unwell with escalating chest discomfort and went to the ED where he was found to have ST elevation MI in the anterior leads.  He was emergently transferred to the [place] for care.  En route he suffered V-fib arrest and received CPR and shock.  He was found to have 99% mid LAD stenosis with embolic thrombosis to very distal LAD.  He received PCI to mid LAD with residual mild disease in circumflex and RCA.  He did well postoperatively without recurrent VT.  He has some residual chest discomfort and shortness of breath felt to be due to potentially pericarditis and was started on colchicine.  Repeat coronary angiogram prior to discharge showed his stent was patent and the very distal LAD now had normal flow.  He has been participating in cardiac rehab.  He has some discomfort  in the chest due to the Herb device and this has gradually almost completely resolved.  He is dealing with some stomach issues with a sour feeling and some constipation.  He is trying to follow a very low sodium diet.  He is here today accompanied by his wife.  He enjoys photography and is hoping to get back to that activity.    Coronary angiogram 1/6/2023  Anterior STEMI.  Single vessel severe CAD.  99% thrombotic lesion in prox-to-mid LAD s/p successful PCI with SARITHA x 1 (Synergy 3.0 x 38 mm SARITHA, post dilated with 3.5 mm balloon); mild disease in RCA and LCx.  Successful closure of right CFA access with 6F Angioseal.      Plan      Follow bedrest per protocol    Continued medical management and lifestyle modifications for cardiovascular risk factor optimizations.    Arterial sheath removed from femoral artery with closure device.    Femoral angiogram identifies arterial sheath placement suitable for closure device.    Admit to inpatient      1. DAPT with ASA/Ticagrelor x 12 months, ASA monotherapy indefinitely thereafter.  2. High intensity statin  3. Echocardiogram  4. Beta blocker once off pressors and hemodynamically stable  5. Aggressive risk factor modification and medical management of coronary artery disease.  6. Bed rest x 3 hours with femoral access site monitoring as per protocol.     Echocardiogram limited study 1/8/2023  Left ventricular function is decreased. The ejection fraction is 40-45%  (mildly reduced). There is a pattern of wall motion abnormalities that is  consistent with a LAD culprit infarction.  Global right ventricular function is normal. This study was compared with the  study from 01/07/2023. No significant changes noted.     Physical Examination  Review of Systems   Vitals: /73 (BP Location: Right arm, Patient Position: Sitting, Cuff Size: Adult Regular)   Pulse 57   Resp 18   Wt 78.9 kg (174 lb)   SpO2 99%   BMI 27.25 kg/m    BMI= Body mass index is 27.25 kg/m .  Wt  Readings from Last 3 Encounters:   03/31/23 78.9 kg (174 lb)   02/14/23 81.6 kg (180 lb)   01/31/23 81.2 kg (179 lb)       General Appearance:   no distress, normal body habitus   ENT/Mouth: membranes moist, no oral lesions or bleeding gums.      EYES:  no scleral icterus, normal conjunctivae   Neck: no carotid bruits or thyromegaly   Chest/Lungs:   lungs are clear to auscultation   Cardiovascular:   Regular. Normal first and second heart sounds with no murmurs , no edema bilaterally    Abdomen:  no organomegaly, masses, bruits, or tenderness; bowel sounds are present   Extremities: no cyanosis or clubbing   Skin: no xanthelasma, warm.    Neurologic: normal  bilateral, no tremors     Psychiatric: alert and oriented x3, calm        Please refer above for cardiac ROS details.        Medical History  Surgical History Family History Social History   Anterior STEMI status post PCI with drug-eluting stent to proximal/mid LAD  V-fib arrest  Heart failure with reduced ejection fraction  Ischemic cardiomyopathy LVEF 40-45%  Hyperlipidemia  Hypertension Past Surgical History:   Procedure Laterality Date     CHOLECYSTECTOMY       CV CORONARY ANGIOGRAM N/A 1/6/2023    Procedure: Coronary Angiogram;  Surgeon: Brandon Walker MD;  Location: [facility] CARDIAC CATH LAB     CV CORONARY ANGIOGRAM N/A 1/10/2023    Procedure: Coronary Angiogram;  Surgeon: Brandon Walker MD;  Location: [facility] CARDIAC CATH LAB     CV INTRAVASULAR ULTRASOUND N/A 1/6/2023    Procedure: Intravascular Ultrasound;  Surgeon: Brandon Walker MD;  Location: [facility] CARDIAC CATH LAB     CV PCI STENT DRUG ELUTING N/A 1/6/2023    Procedure: Percutaneous Coronary Intervention Stent;  Surgeon: Brandon Walker MD;  Location: [facility] CARDIAC CATH LAB     No significant family history of premature coronary disease   Social History     Socioeconomic History     Marital status:      Spouse name:  Not on file     Number of children: Not on file     Years of education: Not on file     Highest education level: Not on file   Occupational History     Not on file   Tobacco Use     Smoking status: Never     Smokeless tobacco: Never   Substance and Sexual Activity     Alcohol use: Yes     Drug use: No     Sexual activity: Not on file   Other Topics Concern     Not on file   Social History Narrative     Not on file     Social Determinants of Health     Financial Resource Strain: Not on file   Food Insecurity: Not on file   Transportation Needs: Not on file   Physical Activity: Not on file   Stress: Not on file   Social Connections: Not on file   Intimate Partner Violence: Not on file   Housing Stability: Not on file           Medications  Allergies   Current Outpatient Medications   Medication Sig Dispense Refill     aspirin 81 mg chewable tablet Take 81 mg by mouth daily       atorvastatin (LIPITOR) 80 MG tablet Take 1 tablet (80 mg) by mouth daily (Patient taking differently: Take 80 mg by mouth daily) 90 tablet 3     famotidine (PEPCID) 20 MG tablet Take 20 mg by mouth 2 times daily       furosemide (LASIX) 40 MG tablet Take 0.5 tablets (20 mg) by mouth daily as needed (take 1 tablet as needed for weight gain >3 lbs with leg swelling, abdominal bloating or shortness of breath) 90 tablet 3     losartan (COZAAR) 50 MG tablet Take 100 mg by mouth daily       metoprolol succinate ER (TOPROL XL) 25 MG 24 hr tablet Take 1 tablet (25 mg) by mouth At Bedtime (Patient taking differently: Take 25 mg by mouth At Bedtime) 90 tablet 3     multivitamin with minerals tablet Take 1 tablet by mouth daily       Omeprazole 20 MG TBDD Take 20 mg by mouth 2 times daily       prasugrel (EFFIENT) 10 MG TABS tablet Take 1 tablet (10 mg) by mouth daily (Patient taking differently: Take 10 mg by mouth daily) 90 tablet 3     sildenafil (VIAGRA) 50 MG tablet [SILDENAFIL (VIAGRA) 50 MG TABLET] Take 1 tab po 1hr prior to sexual activity        sucralfate (CARAFATE) 1 GM tablet Take 1 g by mouth 4 times daily       tamsulosin (FLOMAX) 0.4 MG capsule Take 0.4 mg by mouth daily       nitroGLYcerin (NITROSTAT) 0.4 MG sublingual tablet For chest pain place 1 tablet under the tongue every 5 minutes for 3 doses. If symptoms persist 5 minutes after 1st dose call 911. (Patient not taking: Reported on 2/14/2023) 30 tablet 1       Allergies   Allergen Reactions     Amoxicillin Unknown     Unsure, long time ago       Niacin Unknown     Shellfish Containing Products [Shellfish-Derived Products] Unknown     Lisinopril Cough          Lab Results    Chemistry/lipid CBC Cardiac Enzymes/BNP/TSH/INR   Recent Labs   Lab Test 03/02/23  0740   CHOL 100   HDL 42   LDL 39   TRIG 94     Recent Labs   Lab Test 03/02/23  0740 01/07/23  0041   LDL 39 47  47     Recent Labs   Lab Test 01/12/23  0525      POTASSIUM 3.7   CHLORIDE 101   CO2 21*   *   BUN 24.5*   CR 0.88   GFRESTIMATED 90   RUPA 8.5*     Recent Labs   Lab Test 01/12/23  0525 01/11/23  0513 01/10/23  0441   CR 0.88 0.84 0.74     Recent Labs   Lab Test 01/07/23  0041   A1C 5.7*          Recent Labs   Lab Test 01/12/23  0525   WBC 8.4   HGB 12.2*   HCT 36.5*   MCV 93        Recent Labs   Lab Test 01/12/23  0525 01/11/23  0513 01/10/23  0441   HGB 12.2* 12.0* 11.9*    Recent Labs   Lab Test 07/16/22  0724 06/17/19  0730 06/17/19  0433   TROPONINI <0.01 <0.01 <0.01     Recent Labs   Lab Test 01/07/23  0041   NTBNPI 512     Recent Labs   Lab Test 01/07/23  0041   TSH 3.23     Recent Labs   Lab Test 01/06/23  1949 07/16/22  0724 04/09/19  0812   INR 1.15 1.14 1.17*        Prema Saenz MD

## 2023-03-31 NOTE — PATIENT INSTRUCTIONS
Mr. Jordan Fisher,     It was a pleasure to see you in the office today. My recommendations for you include:   1. May stop colchicine  2. May decrease omeprazole to once daily  3. Echocardiogram to reassess heart function     Please do not hesitate to call the [facility] clinic with any questions or concerns at [phone].    Sincerely,     Prema Saenz MD

## 2023-03-31 NOTE — LETTER
3/31/2023    Wen Mederos MD  [facility] [address]  [facility] [address]    RE: Jordan Fisher       Dear Colleague,     I had the pleasure of seeing Jordan Fisher in the [facility].    HEART CARE ENCOUNTER CONSULTATON NOTE      [facility]  [phone]      Assessment/Recommendations   Assessment:  1.  Anterior STEMI complicated by VF arrest: Status post PCI of mid LAD on 1/6/2023 with residual mild disease.  2.  Ischemic cardiomyopathy LVEF 40-45%  3.  Chronic heart failure with reduced ejection fraction: Appears well compensated  4.  Hypertension: Well-controlled  5.  Dyslipidemia: Recent lipids reviewed and are very well controlled    Plan:  1.  Dual antiplatelet therapy with aspirin and Effient for 1 year and then may stop the Effient and continue on aspirin indefinitely.  2.  High-dose statin therapy  3.  Continue on current doses of Toprol and losartan  4.  May stop the colchicine as it may be causing his GI issues  5.  Follow-up in 3 months       History of Present Illness/Subjective    HPI: Jordan Fisher is a 72 year old male with history of anterior STEMI with VT fib arrest status post PCI to mid LAD on 1/6/2023, ischemic cardiomyopathy EF 40-45%, chronic heart failure with reduced ejection fraction, hypertension, dyslipidemia who I am seeing today to establish care.  He started feeling very unwell with escalating chest discomfort and went to the ED where he was found to have ST elevation MI in the anterior leads.  He was emergently transferred to the [place] for care.  En route he suffered V-fib arrest and received CPR and shock.  He was found to have 99% mid LAD stenosis with embolic thrombosis to very distal LAD.  He received PCI to mid LAD with residual mild disease in circumflex and RCA.  He did well postoperatively without recurrent VT.  He has some residual chest discomfort and shortness of breath felt to be due to  potentially pericarditis and was started on colchicine.  Repeat coronary angiogram prior to discharge showed his stent was patent and the very distal LAD now had normal flow.  He has been participating in cardiac rehab.  He has some discomfort in the chest due to the Herb device and this has gradually almost completely resolved.  He is dealing with some stomach issues with a sour feeling and some constipation.  He is trying to follow a very low sodium diet.  He is here today accompanied by his wife.  He enjoys photography and is hoping to get back to that activity.    Coronary angiogram 1/6/2023  Anterior STEMI.  Single vessel severe CAD.  99% thrombotic lesion in prox-to-mid LAD s/p successful PCI with SARITHA x 1 (Synergy 3.0 x 38 mm SARITHA, post dilated with 3.5 mm balloon); mild disease in RCA and LCx.  Successful closure of right CFA access with 6F Angioseal.      Plan     Follow bedrest per protocol   Continued medical management and lifestyle modifications for cardiovascular risk factor optimizations.   Arterial sheath removed from femoral artery with closure device.   Femoral angiogram identifies arterial sheath placement suitable for closure device.   Admit to inpatient      1. DAPT with ASA/Ticagrelor x 12 months, ASA monotherapy indefinitely thereafter.  2. High intensity statin  3. Echocardiogram  4. Beta blocker once off pressors and hemodynamically stable  5. Aggressive risk factor modification and medical management of coronary artery disease.  6. Bed rest x 3 hours with femoral access site monitoring as per protocol.     Echocardiogram limited study 1/8/2023  Left ventricular function is decreased. The ejection fraction is 40-45%  (mildly reduced). There is a pattern of wall motion abnormalities that is  consistent with a LAD culprit infarction.  Global right ventricular function is normal. This study was compared with the  study from 01/07/2023. No significant changes noted.     Physical Examination   Review of Systems   Vitals: /73 (BP Location: Right arm, Patient Position: Sitting, Cuff Size: Adult Regular)   Pulse 57   Resp 18   Wt 78.9 kg (174 lb)   SpO2 99%   BMI 27.25 kg/m    BMI= Body mass index is 27.25 kg/m .  Wt Readings from Last 3 Encounters:   03/31/23 78.9 kg (174 lb)   02/14/23 81.6 kg (180 lb)   01/31/23 81.2 kg (179 lb)       General Appearance:   no distress, normal body habitus   ENT/Mouth: membranes moist, no oral lesions or bleeding gums.      EYES:  no scleral icterus, normal conjunctivae   Neck: no carotid bruits or thyromegaly   Chest/Lungs:   lungs are clear to auscultation   Cardiovascular:   Regular. Normal first and second heart sounds with no murmurs , no edema bilaterally    Abdomen:  no organomegaly, masses, bruits, or tenderness; bowel sounds are present   Extremities: no cyanosis or clubbing   Skin: no xanthelasma, warm.    Neurologic: normal  bilateral, no tremors     Psychiatric: alert and oriented x3, calm        Please refer above for cardiac ROS details.        Medical History  Surgical History Family History Social History   Anterior STEMI status post PCI with drug-eluting stent to proximal/mid LAD  V-fib arrest  Heart failure with reduced ejection fraction  Ischemic cardiomyopathy LVEF 40-45%  Hyperlipidemia  Hypertension Past Surgical History:   Procedure Laterality Date    CHOLECYSTECTOMY      CV CORONARY ANGIOGRAM N/A 1/6/2023    Procedure: Coronary Angiogram;  Surgeon: Brandon Walker MD;  Location: [facility] CARDIAC CATH LAB    CV CORONARY ANGIOGRAM N/A 1/10/2023    Procedure: Coronary Angiogram;  Surgeon: Brandon Walker MD;  Location: [facility] CARDIAC CATH LAB    CV INTRAVASULAR ULTRASOUND N/A 1/6/2023    Procedure: Intravascular Ultrasound;  Surgeon: Brandon Walker MD;  Location: [facility] CARDIAC CATH LAB    CV PCI STENT DRUG ELUTING N/A 1/6/2023    Procedure: Percutaneous Coronary Intervention Stent;   Surgeon: Brandon Walker MD;  Location:  HEART CARDIAC CATH LAB     No significant family history of premature coronary disease   Social History     Socioeconomic History    Marital status:      Spouse name: Not on file    Number of children: Not on file    Years of education: Not on file    Highest education level: Not on file   Occupational History    Not on file   Tobacco Use    Smoking status: Never    Smokeless tobacco: Never   Substance and Sexual Activity    Alcohol use: Yes    Drug use: No    Sexual activity: Not on file   Other Topics Concern    Not on file   Social History Narrative    Not on file     Social Determinants of Health     Financial Resource Strain: Not on file   Food Insecurity: Not on file   Transportation Needs: Not on file   Physical Activity: Not on file   Stress: Not on file   Social Connections: Not on file   Intimate Partner Violence: Not on file   Housing Stability: Not on file           Medications  Allergies   Current Outpatient Medications   Medication Sig Dispense Refill    aspirin 81 mg chewable tablet Take 81 mg by mouth daily      atorvastatin (LIPITOR) 80 MG tablet Take 1 tablet (80 mg) by mouth daily (Patient taking differently: Take 80 mg by mouth daily) 90 tablet 3    famotidine (PEPCID) 20 MG tablet Take 20 mg by mouth 2 times daily      furosemide (LASIX) 40 MG tablet Take 0.5 tablets (20 mg) by mouth daily as needed (take 1 tablet as needed for weight gain >3 lbs with leg swelling, abdominal bloating or shortness of breath) 90 tablet 3    losartan (COZAAR) 50 MG tablet Take 100 mg by mouth daily      metoprolol succinate ER (TOPROL XL) 25 MG 24 hr tablet Take 1 tablet (25 mg) by mouth At Bedtime (Patient taking differently: Take 25 mg by mouth At Bedtime) 90 tablet 3    multivitamin with minerals tablet Take 1 tablet by mouth daily      Omeprazole 20 MG TBDD Take 20 mg by mouth 2 times daily      prasugrel (EFFIENT) 10 MG TABS tablet Take 1 tablet  (10 mg) by mouth daily (Patient taking differently: Take 10 mg by mouth daily) 90 tablet 3    sildenafil (VIAGRA) 50 MG tablet [SILDENAFIL (VIAGRA) 50 MG TABLET] Take 1 tab po 1hr prior to sexual activity      sucralfate (CARAFATE) 1 GM tablet Take 1 g by mouth 4 times daily      tamsulosin (FLOMAX) 0.4 MG capsule Take 0.4 mg by mouth daily      nitroGLYcerin (NITROSTAT) 0.4 MG sublingual tablet For chest pain place 1 tablet under the tongue every 5 minutes for 3 doses. If symptoms persist 5 minutes after 1st dose call 911. (Patient not taking: Reported on 2/14/2023) 30 tablet 1       Allergies   Allergen Reactions    Amoxicillin Unknown     Unsure, long time ago      Niacin Unknown    Shellfish Containing Products [Shellfish-Derived Products] Unknown    Lisinopril Cough          Lab Results    Chemistry/lipid CBC Cardiac Enzymes/BNP/TSH/INR   Recent Labs   Lab Test 03/02/23  0740   CHOL 100   HDL 42   LDL 39   TRIG 94     Recent Labs   Lab Test 03/02/23  0740 01/07/23  0041   LDL 39 47  47     Recent Labs   Lab Test 01/12/23  0525      POTASSIUM 3.7   CHLORIDE 101   CO2 21*   *   BUN 24.5*   CR 0.88   GFRESTIMATED 90   RUPA 8.5*     Recent Labs   Lab Test 01/12/23  0525 01/11/23  0513 01/10/23  0441   CR 0.88 0.84 0.74     Recent Labs   Lab Test 01/07/23  0041   A1C 5.7*          Recent Labs   Lab Test 01/12/23  0525   WBC 8.4   HGB 12.2*   HCT 36.5*   MCV 93        Recent Labs   Lab Test 01/12/23  0525 01/11/23  0513 01/10/23  0441   HGB 12.2* 12.0* 11.9*    Recent Labs   Lab Test 07/16/22  0724 06/17/19  0730 06/17/19  0433   TROPONINI <0.01 <0.01 <0.01     Recent Labs   Lab Test 01/07/23  0041   NTBNPI 512     Recent Labs   Lab Test 01/07/23  0041   TSH 3.23     Recent Labs   Lab Test 01/06/23  1949 07/16/22  0724 04/09/19  0812   INR 1.15 1.14 1.17*        Prema Saenz MD    Thank you for allowing me to participate in the care of your patient.      Sincerely,     Prema Saenz MD      [facility]  cc:   Alexander Tuttle MD  [address] [facility]  [place] [address]

## 2023-04-03 ENCOUNTER — HOSPITAL ENCOUNTER (OUTPATIENT)
Dept: CARDIAC REHAB | Facility: HOSPITAL | Age: 73
Discharge: HOME | End: 2023-04-03
Attending: INTERNAL MEDICINE
Payer: COMMERCIAL

## 2023-04-03 PROCEDURE — 93798 PHYS/QHP OP CAR RHAB W/ECG: CPT

## 2023-04-05 ENCOUNTER — HOSPITAL ENCOUNTER (OUTPATIENT)
Dept: CARDIAC REHAB | Facility: HOSPITAL | Age: 73
Discharge: HOME | End: 2023-04-05
Attending: INTERNAL MEDICINE
Payer: COMMERCIAL

## 2023-04-05 PROCEDURE — 93798 PHYS/QHP OP CAR RHAB W/ECG: CPT

## 2023-04-06 ENCOUNTER — HOSPITAL ENCOUNTER (OUTPATIENT)
Dept: CARDIAC REHAB | Facility: HOSPITAL | Age: 73
Discharge: HOME | End: 2023-04-06
Attending: INTERNAL MEDICINE
Payer: COMMERCIAL

## 2023-04-06 PROCEDURE — 93798 PHYS/QHP OP CAR RHAB W/ECG: CPT

## 2023-04-10 ENCOUNTER — HOSPITAL ENCOUNTER (OUTPATIENT)
Dept: CARDIAC REHAB | Facility: HOSPITAL | Age: 73
Discharge: HOME | End: 2023-04-10
Attending: INTERNAL MEDICINE
Payer: COMMERCIAL

## 2023-04-10 PROCEDURE — 93798 PHYS/QHP OP CAR RHAB W/ECG: CPT

## 2023-04-12 ENCOUNTER — HOSPITAL ENCOUNTER (OUTPATIENT)
Dept: CARDIAC REHAB | Facility: HOSPITAL | Age: 73
Discharge: HOME | End: 2023-04-12
Attending: INTERNAL MEDICINE
Payer: COMMERCIAL

## 2023-04-12 PROCEDURE — 93798 PHYS/QHP OP CAR RHAB W/ECG: CPT

## 2023-04-13 ENCOUNTER — HOSPITAL ENCOUNTER (OUTPATIENT)
Dept: CARDIAC REHAB | Facility: HOSPITAL | Age: 73
Discharge: HOME | End: 2023-04-13
Attending: INTERNAL MEDICINE
Payer: COMMERCIAL

## 2023-04-13 PROCEDURE — 93797 PHYS/QHP OP CAR RHAB WO ECG: CPT

## 2023-04-13 PROCEDURE — 93798 PHYS/QHP OP CAR RHAB W/ECG: CPT

## 2023-04-30 PROBLEM — I10 ESSENTIAL (PRIMARY) HYPERTENSION: Status: RESOLVED | Noted: 2022-07-16 | Resolved: 2023-04-30

## 2023-04-30 PROBLEM — I50.22 CHRONIC SYSTOLIC HEART FAILURE (H): Status: ACTIVE | Noted: 2023-01-31

## 2023-04-30 NOTE — PROGRESS NOTES
Assessment/Recommendations   Assessment:    1.  Ischemic cardiomyopathy with mildly reduced ejection fraction LVEF of 40 to 45% NYHA class II: Patient is well compensated with no evidence of fluid retention on exam except noted mild lower extremity edema.    Current heart failure regimen includes  On beta-blocker therapy with metoprolol succinate 25 mg daily at bedtime  On ARB therapy with losartan 100 mg daily in a.m.  On diuretic therapy with furosemide 20 mg as needed.  Most recent BMP stable in January 2023.      He reports drinking about 50 to 60 ounces of fluid per day.  He reports following heart healthy and low-sodium diet.    Home weight is stable at 171 to 172 pounds.    2. Coronary artery disease with status post STEMI complicated by ventricular fibrillation with an cardiac arrest requiring requiring defibrillation with ROSC/dyslipidemia with LDL goal less than 70: Status post PCI/SARITHA to proximal to mid LAD on 1/6/2023.  Repeat coronary angiogram on 1/10/2023 showed patent stents.      On dual antiplatelet therapy with ASA 81 mg indefinitely and Prasugrel (Effient) 10 mg daily for 12 months-completes on 1/10/2024.  On as needed nitroglycerin.  On atorvastatin 80 mg daily.  Most recent LDL is 39 in March 2023.  AST and ALT has normalized.    Denies chest pain.  He has history of Ambrose's esophagus and is on omeprazole and had procedure done several years ago to correct Ambrose's esophagus.    Completed cardiac rehab.  Colchicine was discontinued by Dr. Connelly in March.     3. Hypertension: Blood pressure today is 138/70.  He reports his home blood pressure around systolic in 120s and diastolic in 70s to 80s.   He reports occasional dizziness when he turns quickly.    4.  GERD: On omeprazole     5.  Obstructive sleep apnea: Patient is scheduled for sleep evaluation in in August.  This was rescheduled as he missed his appointment.    6.  Carotid artery stenosis: Neck CTA showed 50% stenosis in right  internal carotid artery and less than 50% stenosis in left internal carotid artery.  Patient is asymptomatic.    Plan:  - ECHO as scheduled today    -Instructed to take furosemide 20 mg daily for 2 days for lower extremity edema.  May take it for 3 days if needed.  Patient was instructed to call if no improvement in leg swelling.  -Reinforced heart healthy, low-sodium diet and adequate fluid intake.  -We will defer to PCP for further monitoring of his carotid artery stenosis.    Follow-up with Dr. Saenz as scheduled 2 months as recommended.  Follow-up with me in 6 months or sooner if needed     History of Present Illness/Subjective    Mr. Jordan Fisher is a 72 year old male with a past medical history of hypertension, hyperlipidemia, overweight, and recent hospitalization with STEMI involving anterior wall, V. fib and cardiac arrest requiring defibrillation and ROSC, dyslipidemia, ischemic cardiomyopathy with acute systolic heart failure with mildly reduced ejection fraction, and now post PCI to mid LAD who is seen at [facility] for for continued heart failure follow-up.    His initial echocardiogram showed mildly reduced LVEF of 45 to 50%.  Repeat limited echo showed LVEF reduced to 40 to 45% with no significant valve abnormalities noted.    During last clinic visit, patient was doing well from cardiac standpoint.  He was having some chest soreness from compression.  He was experiencing occasional flutter sensation in his epigastric area which he thought was more due to issue with Ambrose's esophagus.  He was also experiencing some dry cough.    Patient saw Dr. Saenz in March.  Note reviewed.    Today, Jordan states he continues to do well from cardiac standpoint.  He reports following heart healthy and low-sodium diet.  His home weight has been stable.  However, he noted his legs are little more tight today.  He denies fatigue, lightheadedness, shortness of breath, dyspnea  on exertion, orthopnea, PND, palpitations, chest pain and abdominal fullness/bloating.      Coronary Angiogram on 1/10/23: reviewed:  Conclusion  Widely patent stent in prox-to-mid LAD.  Mild non obstructive disease in RCA and LCx (unchanged from prior angiogram on 1/6/2023).  No new obstructive lesions noted.    Chest wall pain secondary to CPR during recent cardiac arrest.      Plan    Follow bedrest per protocol    Continued medical management and lifestyle modifications for cardiovascular risk factor optimizations.    Follow up visit with Nurse Practitioner in 1-2 weeks.    Arterial sheath removed from radial artery with TR band placement.    Cardiac rehabilitation.    Return to the primary inpatient team for further evaluation and managmenet    1. DAPT x at least 12 months  2. High intensity statin     Cor Angio on 1/6/23Conclusion  Anterior STEMI.  Single vessel severe CAD.  99% thrombotic lesion in prox-to-mid LAD s/p successful PCI with SARITHA x 1 (Synergy 3.0 x 38 mm SARITHA, post dilated with 3.5 mm balloon); mild disease in RCA and LCx.  Successful closure of right CFA access with 6F Angioseal.        Plan    Follow bedrest per protocol    Continued medical management and lifestyle modifications for cardiovascular risk factor optimizations.    Arterial sheath removed from femoral artery with closure device.    Femoral angiogram identifies arterial sheath placement suitable for closure device.    Admit to inpatient    1. DAPT with ASA/Ticagrelor x 12 months, ASA monotherapy indefinitely thereafter.  2. High intensity statin  3. Echocardiogram  4. Beta blocker once off pressors and hemodynamically stable  5. Aggressive risk factor modification and medical management of coronary artery disease.  6. Bed rest x 3 hours with femoral access site monitoring as per protocol.     ECHO 1/8/23-Reviewed:   Interpretation Summary  Limited TTE to evaluate for LV function/wall motion.     Left ventricular function is decreased.  "The ejection fraction is 40-45%  (mildly reduced). There is a pattern of wall motion abnormalities that is  consistent with a LAD culprit infarction.  Global right ventricular function is normal. This study was compared with the  study from 01/07/2023. No significant changes noted.     Physical Examination Review of Systems   /70 (BP Location: Left arm, Patient Position: Sitting, Cuff Size: Adult Regular)   Pulse 54   Resp 16   Ht 1.702 m (5' 7\")   Wt 82.1 kg (181 lb)   BMI 28.35 kg/m    Body mass index is 28.35 kg/m .  Wt Readings from Last 3 Encounters:   05/01/23 82.1 kg (181 lb)   03/31/23 78.9 kg (174 lb)   02/14/23 81.6 kg (180 lb)     General Appearance:   no distress, normal body habitus   ENT/Mouth: membranes moist, no oral lesions or bleeding gums.      EYES:  no scleral icterus, normal conjunctivae   Neck: no carotid bruits or thyromegaly   Chest/Lungs:   lungs are clear to auscultation, no rales or wheezing, equal chest wall expansion    Cardiovascular:    Bradycardic. Normal first and second heart sounds with no murmurs, rubs, or gallops; the carotid, radial and posterior tibial pulses are intact, Jugular venous pressure flat, mild edema bilaterally    Abdomen:  no organomegaly, masses, bruits, or tenderness; bowel sounds are present   Extremities   no cyanosis or clubbing    Radial pulses and Pedal pulses intact and symmetrical.  CMS intact.   Skin: no xanthelasma, warm.    Neurologic: normal  bilateral, no tremors   Psychiatric: alert and oriented x3, calm                                                    Negative unless noted in HPI     Medical History  Surgical History Family History Social History   No past medical history on file. Past Surgical History:   Procedure Laterality Date     CHOLECYSTECTOMY       CV CORONARY ANGIOGRAM N/A 1/6/2023    Procedure: Coronary Angiogram;  Surgeon: Brandon Walker MD;  Location:  HEART CARDIAC CATH LAB     CV CORONARY ANGIOGRAM " N/A 1/10/2023    Procedure: Coronary Angiogram;  Surgeon: Brandon Walker MD;  Location: [facility] CARDIAC CATH LAB     CV INTRAVASULAR ULTRASOUND N/A 1/6/2023    Procedure: Intravascular Ultrasound;  Surgeon: Brandon Walker MD;  Location: [facility] CARDIAC CATH LAB     CV PCI STENT DRUG ELUTING N/A 1/6/2023    Procedure: Percutaneous Coronary Intervention Stent;  Surgeon: Brandon Walker MD;  Location: [facility] CARDIAC CATH LAB    No family history on file. Social History     Socioeconomic History     Marital status:      Spouse name: Not on file     Number of children: Not on file     Years of education: Not on file     Highest education level: Not on file   Occupational History     Not on file   Tobacco Use     Smoking status: Never     Smokeless tobacco: Never   Vaping Use     Vaping status: Not on file   Substance and Sexual Activity     Alcohol use: Yes     Drug use: No     Sexual activity: Not on file   Other Topics Concern     Not on file   Social History Narrative     Not on file     Social Determinants of Health     Financial Resource Strain: Not on file   Food Insecurity: Not on file   Transportation Needs: Not on file   Physical Activity: Not on file   Stress: Not on file   Social Connections: Not on file   Intimate Partner Violence: Not on file   Housing Stability: Not on file          Medications  Allergies   Current Outpatient Medications   Medication Sig Dispense Refill     aspirin 81 mg chewable tablet Take 81 mg by mouth daily       atorvastatin (LIPITOR) 80 MG tablet Take 1 tablet (80 mg) by mouth daily (Patient taking differently: Take 80 mg by mouth daily) 90 tablet 3     famotidine (PEPCID) 20 MG tablet Take 20 mg by mouth 2 times daily       furosemide (LASIX) 40 MG tablet Take 0.5 tablets (20 mg) by mouth daily as needed (take 1 tablet as needed for weight gain >3 lbs with leg swelling, abdominal bloating or shortness of breath) 90 tablet 3      losartan (COZAAR) 100 MG tablet Take 1 tablet by mouth daily       metoprolol succinate ER (TOPROL XL) 25 MG 24 hr tablet Take 1 tablet (25 mg) by mouth At Bedtime (Patient taking differently: Take 25 mg by mouth At Bedtime) 90 tablet 3     multivitamin with minerals tablet Take 1 tablet by mouth daily       Omeprazole 20 MG TBDD Take 20 mg by mouth daily       prasugrel (EFFIENT) 10 MG TABS tablet Take 1 tablet (10 mg) by mouth daily (Patient taking differently: Take 10 mg by mouth daily) 90 tablet 3     tamsulosin (FLOMAX) 0.4 MG capsule Take 0.4 mg by mouth daily       nitroGLYcerin (NITROSTAT) 0.4 MG sublingual tablet For chest pain place 1 tablet under the tongue every 5 minutes for 3 doses. If symptoms persist 5 minutes after 1st dose call 911. (Patient not taking: Reported on 2/14/2023) 30 tablet 1    Allergies   Allergen Reactions     Amoxicillin Unknown     Unsure, long time ago       Niacin Unknown     Shellfish Containing Products [Shellfish-Derived Products] Unknown     Lisinopril Cough         Lab Results    Chemistry/lipid CBC Cardiac Enzymes/BNP/TSH/INR   Lab Results   Component Value Date    CHOL 100 03/02/2023    HDL 42 03/02/2023    TRIG 94 03/02/2023    BUN 24.5 (H) 01/12/2023     01/12/2023    CO2 21 (L) 01/12/2023    Lab Results   Component Value Date    WBC 8.4 01/12/2023    HGB 12.2 (L) 01/12/2023    HCT 36.5 (L) 01/12/2023    MCV 93 01/12/2023     01/12/2023    Lab Results   Component Value Date    TROPONINI <0.01 07/16/2022    TSH 3.23 01/07/2023    INR 1.15 01/06/2023        35 minutes spent on the date of encounter doing chart review, review of test results, interpretation with above tests, patient visit and documentation.        This note has been dictated using voice recognition software. Any grammatical, typographical, or context distortions are unintentional and inherent to the software

## 2023-05-01 ENCOUNTER — OFFICE VISIT (OUTPATIENT)
Dept: CARDIOLOGY | Facility: CLINIC | Age: 73
End: 2023-05-01
Payer: COMMERCIAL

## 2023-05-01 ENCOUNTER — HOSPITAL ENCOUNTER (OUTPATIENT)
Dept: CARDIOLOGY | Facility: HOSPITAL | Age: 73
Discharge: HOME | End: 2023-05-01
Attending: INTERNAL MEDICINE | Admitting: INTERNAL MEDICINE
Payer: COMMERCIAL

## 2023-05-01 VITALS
BODY MASS INDEX: 28.41 KG/M2 | HEIGHT: 67 IN | SYSTOLIC BLOOD PRESSURE: 138 MMHG | RESPIRATION RATE: 16 BRPM | DIASTOLIC BLOOD PRESSURE: 70 MMHG | WEIGHT: 181 LBS | HEART RATE: 54 BPM

## 2023-05-01 DIAGNOSIS — I25.5 ISCHEMIC CARDIOMYOPATHY: Primary | ICD-10-CM

## 2023-05-01 DIAGNOSIS — I10 ESSENTIAL HYPERTENSION: ICD-10-CM

## 2023-05-01 DIAGNOSIS — I50.22 CHRONIC SYSTOLIC HEART FAILURE (H): ICD-10-CM

## 2023-05-01 DIAGNOSIS — I25.83 CORONARY ARTERY DISEASE DUE TO LIPID RICH PLAQUE: ICD-10-CM

## 2023-05-01 DIAGNOSIS — E78.5 DYSLIPIDEMIA, GOAL LDL BELOW 70: ICD-10-CM

## 2023-05-01 DIAGNOSIS — G47.33 OSA (OBSTRUCTIVE SLEEP APNEA): ICD-10-CM

## 2023-05-01 DIAGNOSIS — I21.02 ACUTE ST ELEVATION MYOCARDIAL INFARCTION (STEMI) INVOLVING LEFT ANTERIOR DESCENDING (LAD) CORONARY ARTERY (H): ICD-10-CM

## 2023-05-01 DIAGNOSIS — I25.10 CORONARY ARTERY DISEASE DUE TO LIPID RICH PLAQUE: ICD-10-CM

## 2023-05-01 LAB — LVEF ECHO: NORMAL

## 2023-05-01 PROCEDURE — 93306 TTE W/DOPPLER COMPLETE: CPT

## 2023-05-01 PROCEDURE — 99214 OFFICE O/P EST MOD 30 MIN: CPT | Mod: 25 | Performed by: NURSE PRACTITIONER

## 2023-05-01 PROCEDURE — 93306 TTE W/DOPPLER COMPLETE: CPT | Mod: 26 | Performed by: INTERNAL MEDICINE

## 2023-05-01 RX ORDER — LOSARTAN POTASSIUM 100 MG/1
1 TABLET ORAL DAILY
COMMUNITY
Start: 2023-02-08

## 2023-05-01 NOTE — LETTER
5/1/2023    Wen Mederos MD  [facility] [address]  [facility] [address]    RE: Jordan Fisher       Dear Colleague,     I had the pleasure of seeing Jordan Fisher in the [facility].          Assessment/Recommendations   Assessment:    1.  Ischemic cardiomyopathy with mildly reduced ejection fraction LVEF of 40 to 45% NYHA class II: Patient is well compensated with no evidence of fluid retention on exam except noted mild lower extremity edema.    Current heart failure regimen includes  On beta-blocker therapy with metoprolol succinate 25 mg daily at bedtime  On ARB therapy with losartan 100 mg daily in a.m.  On diuretic therapy with furosemide 20 mg as needed.  Most recent BMP stable in January 2023.      He reports drinking about 50 to 60 ounces of fluid per day.  He reports following heart healthy and low-sodium diet.    Home weight is stable at 171 to 172 pounds.    2. Coronary artery disease with status post STEMI complicated by ventricular fibrillation with an cardiac arrest requiring requiring defibrillation with ROSC/dyslipidemia with LDL goal less than 70: Status post PCI/SARITHA to proximal to mid LAD on 1/6/2023.  Repeat coronary angiogram on 1/10/2023 showed patent stents.      On dual antiplatelet therapy with ASA 81 mg indefinitely and Prasugrel (Effient) 10 mg daily for 12 months-completes on 1/10/2024.  On as needed nitroglycerin.  On atorvastatin 80 mg daily.  Most recent LDL is 39 in March 2023.  AST and ALT has normalized.    Denies chest pain.  He has history of Ambrose's esophagus and is on omeprazole and had procedure done several years ago to correct Ambrose's esophagus.    Completed cardiac rehab.  Colchicine was discontinued by Dr. Connelly in March.     3. Hypertension: Blood pressure today is 138/70.  He reports his home blood pressure around systolic in 120s and diastolic in 70s to 80s.   He reports occasional dizziness when he turns quickly.    4.   GERD: On omeprazole     5.  Obstructive sleep apnea: Patient is scheduled for sleep evaluation in in August.  This was rescheduled as he missed his appointment.    6.  Carotid artery stenosis: Neck CTA showed 50% stenosis in right internal carotid artery and less than 50% stenosis in left internal carotid artery.  Patient is asymptomatic.    Plan:  - ECHO as scheduled today    -Instructed to take furosemide 20 mg daily for 2 days for lower extremity edema.  May take it for 3 days if needed.  Patient was instructed to call if no improvement in leg swelling.  -Reinforced heart healthy, low-sodium diet and adequate fluid intake.  -We will defer to PCP for further monitoring of his carotid artery stenosis.    Follow-up with Dr. Saenz as scheduled 2 months as recommended.  Follow-up with me in 6 months or sooner if needed     History of Present Illness/Subjective    Mr. Jordan Fisher is a 72 year old male with a past medical history of hypertension, hyperlipidemia, overweight, and recent hospitalization with STEMI involving anterior wall, V. fib and cardiac arrest requiring defibrillation and ROSC, dyslipidemia, ischemic cardiomyopathy with acute systolic heart failure with mildly reduced ejection fraction, and now post PCI to mid LAD who is seen at [facility] for for continued heart failure follow-up.    His initial echocardiogram showed mildly reduced LVEF of 45 to 50%.  Repeat limited echo showed LVEF reduced to 40 to 45% with no significant valve abnormalities noted.    During last clinic visit, patient was doing well from cardiac standpoint.  He was having some chest soreness from compression.  He was experiencing occasional flutter sensation in his epigastric area which he thought was more due to issue with Ambrose's esophagus.  He was also experiencing some dry cough.    Patient saw Dr. Saenz in March.  Note reviewed.    Today, Jordan states he continues to do well from  cardiac standpoint.  He reports following heart healthy and low-sodium diet.  His home weight has been stable.  However, he noted his legs are little more tight today.  He denies fatigue, lightheadedness, shortness of breath, dyspnea on exertion, orthopnea, PND, palpitations, chest pain and abdominal fullness/bloating.      Coronary Angiogram on 1/10/23: reviewed:  Conclusion  Widely patent stent in prox-to-mid LAD.  Mild non obstructive disease in RCA and LCx (unchanged from prior angiogram on 1/6/2023).  No new obstructive lesions noted.    Chest wall pain secondary to CPR during recent cardiac arrest.      Plan   Follow bedrest per protocol   Continued medical management and lifestyle modifications for cardiovascular risk factor optimizations.   Follow up visit with Nurse Practitioner in 1-2 weeks.   Arterial sheath removed from radial artery with TR band placement.   Cardiac rehabilitation.   Return to the primary inpatient team for further evaluation and managmenet    1. DAPT x at least 12 months  2. High intensity statin     Cor Angio on 1/6/23Conclusion  Anterior STEMI.  Single vessel severe CAD.  99% thrombotic lesion in prox-to-mid LAD s/p successful PCI with SARITHA x 1 (Synergy 3.0 x 38 mm SARITHA, post dilated with 3.5 mm balloon); mild disease in RCA and LCx.  Successful closure of right CFA access with 6F Angioseal.        Plan   Follow bedrest per protocol   Continued medical management and lifestyle modifications for cardiovascular risk factor optimizations.   Arterial sheath removed from femoral artery with closure device.   Femoral angiogram identifies arterial sheath placement suitable for closure device.   Admit to inpatient    1. DAPT with ASA/Ticagrelor x 12 months, ASA monotherapy indefinitely thereafter.  2. High intensity statin  3. Echocardiogram  4. Beta blocker once off pressors and hemodynamically stable  5. Aggressive risk factor modification and medical management of coronary artery  "disease.  6. Bed rest x 3 hours with femoral access site monitoring as per protocol.     ECHO 1/8/23-Reviewed:   Interpretation Summary  Limited TTE to evaluate for LV function/wall motion.     Left ventricular function is decreased. The ejection fraction is 40-45%  (mildly reduced). There is a pattern of wall motion abnormalities that is  consistent with a LAD culprit infarction.  Global right ventricular function is normal. This study was compared with the  study from 01/07/2023. No significant changes noted.     Physical Examination Review of Systems   /70 (BP Location: Left arm, Patient Position: Sitting, Cuff Size: Adult Regular)   Pulse 54   Resp 16   Ht 1.702 m (5' 7\")   Wt 82.1 kg (181 lb)   BMI 28.35 kg/m    Body mass index is 28.35 kg/m .  Wt Readings from Last 3 Encounters:   05/01/23 82.1 kg (181 lb)   03/31/23 78.9 kg (174 lb)   02/14/23 81.6 kg (180 lb)     General Appearance:   no distress, normal body habitus   ENT/Mouth: membranes moist, no oral lesions or bleeding gums.      EYES:  no scleral icterus, normal conjunctivae   Neck: no carotid bruits or thyromegaly   Chest/Lungs:   lungs are clear to auscultation, no rales or wheezing, equal chest wall expansion    Cardiovascular:    Bradycardic. Normal first and second heart sounds with no murmurs, rubs, or gallops; the carotid, radial and posterior tibial pulses are intact, Jugular venous pressure flat, mild edema bilaterally    Abdomen:  no organomegaly, masses, bruits, or tenderness; bowel sounds are present   Extremities   no cyanosis or clubbing    Radial pulses and Pedal pulses intact and symmetrical.  CMS intact.   Skin: no xanthelasma, warm.    Neurologic: normal  bilateral, no tremors   Psychiatric: alert and oriented x3, calm                                                    Negative unless noted in HPI     Medical History  Surgical History Family History Social History   No past medical history on file. Past Surgical " History:   Procedure Laterality Date    CHOLECYSTECTOMY      CV CORONARY ANGIOGRAM N/A 1/6/2023    Procedure: Coronary Angiogram;  Surgeon: Brandon Walker MD;  Location: [facility] CARDIAC CATH LAB    CV CORONARY ANGIOGRAM N/A 1/10/2023    Procedure: Coronary Angiogram;  Surgeon: Brandon Walker MD;  Location: [facility] CARDIAC CATH LAB    CV INTRAVASULAR ULTRASOUND N/A 1/6/2023    Procedure: Intravascular Ultrasound;  Surgeon: Brandon Walker MD;  Location: [facility] CARDIAC CATH LAB    CV PCI STENT DRUG ELUTING N/A 1/6/2023    Procedure: Percutaneous Coronary Intervention Stent;  Surgeon: Brandon Walker MD;  Location: [facility] CARDIAC CATH LAB    No family history on file. Social History     Socioeconomic History    Marital status:      Spouse name: Not on file    Number of children: Not on file    Years of education: Not on file    Highest education level: Not on file   Occupational History    Not on file   Tobacco Use    Smoking status: Never    Smokeless tobacco: Never   Vaping Use    Vaping status: Not on file   Substance and Sexual Activity    Alcohol use: Yes    Drug use: No    Sexual activity: Not on file   Other Topics Concern    Not on file   Social History Narrative    Not on file     Social Determinants of Health     Financial Resource Strain: Not on file   Food Insecurity: Not on file   Transportation Needs: Not on file   Physical Activity: Not on file   Stress: Not on file   Social Connections: Not on file   Intimate Partner Violence: Not on file   Housing Stability: Not on file          Medications  Allergies   Current Outpatient Medications   Medication Sig Dispense Refill    aspirin 81 mg chewable tablet Take 81 mg by mouth daily      atorvastatin (LIPITOR) 80 MG tablet Take 1 tablet (80 mg) by mouth daily (Patient taking differently: Take 80 mg by mouth daily) 90 tablet 3    famotidine (PEPCID) 20 MG tablet Take 20 mg by mouth 2  times daily      furosemide (LASIX) 40 MG tablet Take 0.5 tablets (20 mg) by mouth daily as needed (take 1 tablet as needed for weight gain >3 lbs with leg swelling, abdominal bloating or shortness of breath) 90 tablet 3    losartan (COZAAR) 100 MG tablet Take 1 tablet by mouth daily      metoprolol succinate ER (TOPROL XL) 25 MG 24 hr tablet Take 1 tablet (25 mg) by mouth At Bedtime (Patient taking differently: Take 25 mg by mouth At Bedtime) 90 tablet 3    multivitamin with minerals tablet Take 1 tablet by mouth daily      Omeprazole 20 MG TBDD Take 20 mg by mouth daily      prasugrel (EFFIENT) 10 MG TABS tablet Take 1 tablet (10 mg) by mouth daily (Patient taking differently: Take 10 mg by mouth daily) 90 tablet 3    tamsulosin (FLOMAX) 0.4 MG capsule Take 0.4 mg by mouth daily      nitroGLYcerin (NITROSTAT) 0.4 MG sublingual tablet For chest pain place 1 tablet under the tongue every 5 minutes for 3 doses. If symptoms persist 5 minutes after 1st dose call 911. (Patient not taking: Reported on 2/14/2023) 30 tablet 1    Allergies   Allergen Reactions    Amoxicillin Unknown     Unsure, long time ago      Niacin Unknown    Shellfish Containing Products [Shellfish-Derived Products] Unknown    Lisinopril Cough         Lab Results    Chemistry/lipid CBC Cardiac Enzymes/BNP/TSH/INR   Lab Results   Component Value Date    CHOL 100 03/02/2023    HDL 42 03/02/2023    TRIG 94 03/02/2023    BUN 24.5 (H) 01/12/2023     01/12/2023    CO2 21 (L) 01/12/2023    Lab Results   Component Value Date    WBC 8.4 01/12/2023    HGB 12.2 (L) 01/12/2023    HCT 36.5 (L) 01/12/2023    MCV 93 01/12/2023     01/12/2023    Lab Results   Component Value Date    TROPONINI <0.01 07/16/2022    TSH 3.23 01/07/2023    INR 1.15 01/06/2023        35 minutes spent on the date of encounter doing chart review, review of test results, interpretation with above tests, patient visit and documentation.        This note has been dictated using  voice recognition software. Any grammatical, typographical, or context distortions are unintentional and inherent to the software          Thank you for allowing me to participate in the care of your patient.      Sincerely,     VERONA Salgado CNP     [facility]  cc:   VERONA Slagado CNP  [address]  [place] [address]

## 2023-05-01 NOTE — PATIENT INSTRUCTIONS
Jordan Fisher,    It was a pleasure to see you today at the [facility].     My recommendations after this visit include:    - Take Furosemide half a tablet daily for 2 days for leg swelling. You may take it for 3 days if needed.    - Please call us if no improvement your leg swelling    - Please discuss with Dr. Mederos if you need repeat imaging for you carotid artery    - ECHO as scheduled today    - Follow up with Lauri in 6 months     - Follow up with Dr. Saenz as recommended in late June or early July    - Please call Heart Failure Nurse Line at [phone], if you have any questions or concerns

## 2023-06-29 ENCOUNTER — TRANSFERRED RECORDS (OUTPATIENT)
Dept: HEALTH INFORMATION MANAGEMENT | Facility: CLINIC | Age: 73
End: 2023-06-29

## 2023-08-22 ENCOUNTER — OFFICE VISIT (OUTPATIENT)
Dept: CARDIOLOGY | Facility: CLINIC | Age: 73
End: 2023-08-22
Attending: INTERNAL MEDICINE
Payer: COMMERCIAL

## 2023-08-22 VITALS
WEIGHT: 174 LBS | RESPIRATION RATE: 16 BRPM | HEIGHT: 67 IN | DIASTOLIC BLOOD PRESSURE: 70 MMHG | BODY MASS INDEX: 27.31 KG/M2 | SYSTOLIC BLOOD PRESSURE: 142 MMHG | HEART RATE: 61 BPM

## 2023-08-22 DIAGNOSIS — E78.5 DYSLIPIDEMIA, GOAL LDL BELOW 70: ICD-10-CM

## 2023-08-22 DIAGNOSIS — N52.9 ERECTILE DYSFUNCTION, UNSPECIFIED ERECTILE DYSFUNCTION TYPE: Primary | ICD-10-CM

## 2023-08-22 DIAGNOSIS — I21.02 ACUTE ST ELEVATION MYOCARDIAL INFARCTION (STEMI) INVOLVING LEFT ANTERIOR DESCENDING (LAD) CORONARY ARTERY (H): ICD-10-CM

## 2023-08-22 PROCEDURE — 99214 OFFICE O/P EST MOD 30 MIN: CPT | Performed by: INTERNAL MEDICINE

## 2023-08-22 RX ORDER — ATORVASTATIN CALCIUM 40 MG/1
40 TABLET, FILM COATED ORAL DAILY
Qty: 90 TABLET | Refills: 3 | Status: SHIPPED | OUTPATIENT
Start: 2023-08-22

## 2023-08-22 RX ORDER — SILDENAFIL 25 MG/1
25 TABLET, FILM COATED ORAL DAILY PRN
Qty: 10 TABLET | Refills: 0 | Status: SHIPPED | OUTPATIENT
Start: 2023-08-22

## 2023-08-22 NOTE — LETTER
8/22/2023    Wen Mederos MD  [facility] [address]  [facility] [address]    RE: Jordan Fisher       Dear Colleague,     I had the pleasure of seeing Jordan Fisher in the [facility].    HEART CARE ENCOUNTER CONSULTATON NOTE      [facility]  [phone]      Assessment/Recommendations   Assessment:  1.  Anterior STEMI complicated by VF arrest: Status post PCI of mid LAD on 1/6/2023 with residual mild disease.  2.  Hypertension: Well-controlled  3.  Dyslipidemia: Recent lipids reviewed and are very well controlled.  Patient complains of myalgias and will decrease dose of Lipitor to 40 mg a day with repeat fasting lipids in 2 to 3 months.     Plan:  1.  Dual antiplatelet therapy with aspirin and Effient until January 2024 and then may stop the Effient.  2.  May decrease Lipitor to 40 mg daily and repeat fasting lipids in 2 to 3 months  3.  Continue on current doses of Toprol and losartan  4.  Follow-up in 1 year         History of Present Illness/Subjective    HPI: Jordan Fisher is a 72 year old male with history of anterior STEMI with VT fib arrest status post PCI to mid LAD on 1/6/2023, ischemic cardiomyopathy EF 40-45% now normalized, hypertension, dyslipidemia who I am seeing today in follow-up.  Repeat echocardiogram with normalized left ventricular ejection fraction.  He complains of occasional twinges in his chest when he bends over.  They feel muscular in nature.  He also complains of bruising easily and pain in his muscles.  He stays active walking 1 to 2 miles a day and feels good with activity.    Echocardiogram 5/1/2023  1.Left ventricular size, wall motion and function are normal. The ejection  fraction is 60-65%.  2.There is mild septal hypokinesis.  3.Normal right ventricle size and systolic function.  4.Thickened aortic valve leaflets There is mild (1+) aortic  regurgitation.Decel 1/2 time is 525 msec.  5.The ascending aorta is  Borderline dilated.  Compared to the prior study dated 1/8/2023, the LV has normalized and the WMA  has resolved.  ______________    Coronary angiogram 1/6/2023  Anterior STEMI.  Single vessel severe CAD.  99% thrombotic lesion in prox-to-mid LAD s/p successful PCI with SARITHA x 1 (Synergy 3.0 x 38 mm SARITHA, post dilated with 3.5 mm balloon); mild disease in RCA and LCx.  Successful closure of right CFA access with 6F Angioseal.        Plan      Follow bedrest per protocol   Continued medical management and lifestyle modifications for cardiovascular risk factor optimizations.   Arterial sheath removed from femoral artery with closure device.   Femoral angiogram identifies arterial sheath placement suitable for closure device.   Admit to inpatient      1. DAPT with ASA/Ticagrelor x 12 months, ASA monotherapy indefinitely thereafter.  2. High intensity statin  3. Echocardiogram  4. Beta blocker once off pressors and hemodynamically stable  5. Aggressive risk factor modification and medical management of coronary artery disease.  6. Bed rest x 3 hours with femoral access site monitoring as per protocol.       Coronary angiogram 1/6/2023  Anterior STEMI.  Single vessel severe CAD.  99% thrombotic lesion in prox-to-mid LAD s/p successful PCI with SARITHA x 1 (Synergy 3.0 x 38 mm SARITHA, post dilated with 3.5 mm balloon); mild disease in RCA and LCx.  Successful closure of right CFA access with 6F Angioseal.        Plan      Follow bedrest per protocol   Continued medical management and lifestyle modifications for cardiovascular risk factor optimizations.   Arterial sheath removed from femoral artery with closure device.   Femoral angiogram identifies arterial sheath placement suitable for closure device.   Admit to inpatient      1. DAPT with ASA/Ticagrelor x 12 months, ASA monotherapy indefinitely thereafter.  2. High intensity statin  3. Echocardiogram  4. Beta blocker once off pressors and hemodynamically stable  5. Aggressive  "risk factor modification and medical management of coronary artery disease.  6. Bed rest x 3 hours with femoral access site monitoring as per protocol.          Physical Examination  Review of Systems   Vitals: BP (!) 142/70 (BP Location: Left arm, Patient Position: Sitting, Cuff Size: Adult Regular)   Pulse 61   Resp 16   Ht 1.702 m (5' 7\")   Wt 78.9 kg (174 lb)   BMI 27.25 kg/m    BMI= Body mass index is 27.25 kg/m .  Wt Readings from Last 3 Encounters:   08/22/23 78.9 kg (174 lb)   05/01/23 82.1 kg (181 lb)   03/31/23 78.9 kg (174 lb)       General Appearance:   no distress, normal body habitus   ENT/Mouth: membranes moist, no oral lesions or bleeding gums.      EYES:  no scleral icterus, normal conjunctivae   Neck: no carotid bruits or thyromegaly   Chest/Lungs:   lungs are clear to auscultation   Cardiovascular:   Regular. Normal first and second heart sounds with no murmur no edema bilaterally        Extremities: no cyanosis or clubbing   Skin: no xanthelasma, warm.    Neurologic: normal  bilateral, no tremors     Psychiatric: alert and oriented x3, calm        Please refer above for cardiac ROS details.        Medical History  Surgical History Family History Social History   Anterior STEMI status post PCI with drug-eluting stent to proximal/mid LAD  V-fib arrest  Heart failure with reduced ejection fraction  Ischemic cardiomyopathy LVEF 40-45%  Hyperlipidemia  Hypertension Past Surgical History:   Procedure Laterality Date    CHOLECYSTECTOMY      CV CORONARY ANGIOGRAM N/A 1/6/2023    Procedure: Coronary Angiogram;  Surgeon: Brandon Walker MD;  Location: [facility] CARDIAC CATH LAB    CV CORONARY ANGIOGRAM N/A 1/10/2023    Procedure: Coronary Angiogram;  Surgeon: Brandon Walker MD;  Location: [facility] CARDIAC CATH LAB    CV INTRAVASULAR ULTRASOUND N/A 1/6/2023    Procedure: Intravascular Ultrasound;  Surgeon: Brandon Walker MD;  Location: [facility] CARDIAC " CATH LAB    CV PCI STENT DRUG ELUTING N/A 1/6/2023    Procedure: Percutaneous Coronary Intervention Stent;  Surgeon: Brandon Walker MD;  Location: [facility] CARDIAC CATH LAB          Social History     Socioeconomic History    Marital status:      Spouse name: Not on file    Number of children: Not on file    Years of education: Not on file    Highest education level: Not on file   Occupational History    Not on file   Tobacco Use    Smoking status: Never    Smokeless tobacco: Never   Substance and Sexual Activity    Alcohol use: Yes    Drug use: No    Sexual activity: Not on file   Other Topics Concern    Not on file   Social History Narrative    Not on file     Social Determinants of Health     Financial Resource Strain: Not on file   Food Insecurity: Not on file   Transportation Needs: Not on file   Physical Activity: Not on file   Stress: Not on file   Social Connections: Not on file   Intimate Partner Violence: Not on file   Housing Stability: Not on file           Medications  Allergies   Current Outpatient Medications   Medication Sig Dispense Refill    aspirin 81 mg chewable tablet Take 81 mg by mouth daily      atorvastatin (LIPITOR) 40 MG tablet Take 1 tablet (40 mg) by mouth daily 90 tablet 3    famotidine (PEPCID) 20 MG tablet Take 20 mg by mouth 2 times daily      furosemide (LASIX) 40 MG tablet Take 0.5 tablets (20 mg) by mouth daily as needed (take 1 tablet as needed for weight gain >3 lbs with leg swelling, abdominal bloating or shortness of breath) 90 tablet 3    losartan (COZAAR) 100 MG tablet Take 1 tablet by mouth daily      metoprolol succinate ER (TOPROL XL) 25 MG 24 hr tablet Take 1 tablet (25 mg) by mouth At Bedtime (Patient taking differently: Take 25 mg by mouth At Bedtime) 90 tablet 3    multivitamin with minerals tablet Take 1 tablet by mouth daily      nitroGLYcerin (NITROSTAT) 0.4 MG sublingual tablet For chest pain place 1 tablet under the tongue every 5 minutes  for 3 doses. If symptoms persist 5 minutes after 1st dose call 911. 30 tablet 1    Omeprazole 20 MG TBDD Take 20 mg by mouth daily      prasugrel (EFFIENT) 10 MG TABS tablet Take 1 tablet (10 mg) by mouth daily (Patient taking differently: Take 10 mg by mouth daily) 90 tablet 3    sildenafil (VIAGRA) 25 MG tablet Take 1 tablet (25 mg) by mouth daily as needed 10 tablet 0    tamsulosin (FLOMAX) 0.4 MG capsule Take 0.4 mg by mouth daily         Allergies   Allergen Reactions    Amoxicillin Unknown     Unsure, long time ago      Niacin Unknown    Shellfish Containing Products [Shellfish-Derived Products] Unknown    Lisinopril Cough          Lab Results    Chemistry/lipid CBC Cardiac Enzymes/BNP/TSH/INR   Recent Labs   Lab Test 03/02/23  0740   CHOL 100   HDL 42   LDL 39   TRIG 94     Recent Labs   Lab Test 03/02/23  0740 01/07/23  0041   LDL 39 47  47     Recent Labs   Lab Test 01/12/23  0525      POTASSIUM 3.7   CHLORIDE 101   CO2 21*   *   BUN 24.5*   CR 0.88   GFRESTIMATED 90   RUPA 8.5*     Recent Labs   Lab Test 01/12/23  0525 01/11/23  0513 01/10/23  0441   CR 0.88 0.84 0.74     Recent Labs   Lab Test 01/07/23  0041   A1C 5.7*          Recent Labs   Lab Test 01/12/23  0525   WBC 8.4   HGB 12.2*   HCT 36.5*   MCV 93        Recent Labs   Lab Test 01/12/23  0525 01/11/23  0513 01/10/23  0441   HGB 12.2* 12.0* 11.9*    Recent Labs   Lab Test 07/16/22  0724 06/17/19  0730 06/17/19  0433   TROPONINI <0.01 <0.01 <0.01     Recent Labs   Lab Test 01/07/23  0041   NTBNPI 512     Recent Labs   Lab Test 01/07/23  0041   TSH 3.23     Recent Labs   Lab Test 01/06/23  1949 07/16/22  0724 04/09/19  0812   INR 1.15 1.14 1.17*        Prema Saenz MD      Thank you for allowing me to participate in the care of your patient.      Sincerely,     Prema Saenz MD     [facility]  cc:   Prema Saenz MD  [address] [facility]  [address]  [place]  [address]

## 2023-08-22 NOTE — PATIENT INSTRUCTIONS
Mr. Jordan Fisher,     It was a pleasure to see you in the office today. My recommendations for you include:   1. Stop prasugrel / effient in 1/2024, continue aspirin 81 mg daily  2. May decrease lipitor to 40 mg daily.  Repeat cholesterol labs in 2-3 months     Please do not hesitate to call the [facility] Care clinic with any questions or concerns at [phone].    Sincerely,     Prema Saenz MD

## 2023-08-22 NOTE — PROGRESS NOTES
HEART CARE ENCOUNTER CONSULTATON NOTE      [facility]  [phone]      Assessment/Recommendations   Assessment:  1.  Anterior STEMI complicated by VF arrest: Status post PCI of mid LAD on 1/6/2023 with residual mild disease.  2.  Hypertension: Well-controlled  3.  Dyslipidemia: Recent lipids reviewed and are very well controlled.  Patient complains of myalgias and will decrease dose of Lipitor to 40 mg a day with repeat fasting lipids in 2 to 3 months.     Plan:  1.  Dual antiplatelet therapy with aspirin and Effient until January 2024 and then may stop the Effient.  2.  May decrease Lipitor to 40 mg daily and repeat fasting lipids in 2 to 3 months  3.  Continue on current doses of Toprol and losartan  4.  Follow-up in 1 year         History of Present Illness/Subjective    HPI: Jordan Fisher is a 72 year old male with history of anterior STEMI with VT fib arrest status post PCI to mid LAD on 1/6/2023, ischemic cardiomyopathy EF 40-45% now normalized, hypertension, dyslipidemia who I am seeing today in follow-up.  Repeat echocardiogram with normalized left ventricular ejection fraction.  He complains of occasional twinges in his chest when he bends over.  They feel muscular in nature.  He also complains of bruising easily and pain in his muscles.  He stays active walking 1 to 2 miles a day and feels good with activity.    Echocardiogram 5/1/2023  1.Left ventricular size, wall motion and function are normal. The ejection  fraction is 60-65%.  2.There is mild septal hypokinesis.  3.Normal right ventricle size and systolic function.  4.Thickened aortic valve leaflets There is mild (1+) aortic  regurgitation.Decel 1/2 time is 525 msec.  5.The ascending aorta is Borderline dilated.  Compared to the prior study dated 1/8/2023, the LV has normalized and the WMA  has resolved.  ______________    Coronary angiogram 1/6/2023  Anterior STEMI.  Single vessel severe CAD.  99% thrombotic lesion in  prox-to-mid LAD s/p successful PCI with SARITHA x 1 (Synergy 3.0 x 38 mm SARITHA, post dilated with 3.5 mm balloon); mild disease in RCA and LCx.  Successful closure of right CFA access with 6F Angioseal.        Plan       Follow bedrest per protocol    Continued medical management and lifestyle modifications for cardiovascular risk factor optimizations.    Arterial sheath removed from femoral artery with closure device.    Femoral angiogram identifies arterial sheath placement suitable for closure device.    Admit to inpatient      1. DAPT with ASA/Ticagrelor x 12 months, ASA monotherapy indefinitely thereafter.  2. High intensity statin  3. Echocardiogram  4. Beta blocker once off pressors and hemodynamically stable  5. Aggressive risk factor modification and medical management of coronary artery disease.  6. Bed rest x 3 hours with femoral access site monitoring as per protocol.       Coronary angiogram 1/6/2023  Anterior STEMI.  Single vessel severe CAD.  99% thrombotic lesion in prox-to-mid LAD s/p successful PCI with SARITHA x 1 (Synergy 3.0 x 38 mm SARITHA, post dilated with 3.5 mm balloon); mild disease in RCA and LCx.  Successful closure of right CFA access with 6F Angioseal.        Plan       Follow bedrest per protocol    Continued medical management and lifestyle modifications for cardiovascular risk factor optimizations.    Arterial sheath removed from femoral artery with closure device.    Femoral angiogram identifies arterial sheath placement suitable for closure device.    Admit to inpatient      1. DAPT with ASA/Ticagrelor x 12 months, ASA monotherapy indefinitely thereafter.  2. High intensity statin  3. Echocardiogram  4. Beta blocker once off pressors and hemodynamically stable  5. Aggressive risk factor modification and medical management of coronary artery disease.  6. Bed rest x 3 hours with femoral access site monitoring as per protocol.          Physical Examination  Review of Systems   Vitals: BP (!)  "142/70 (BP Location: Left arm, Patient Position: Sitting, Cuff Size: Adult Regular)   Pulse 61   Resp 16   Ht 1.702 m (5' 7\")   Wt 78.9 kg (174 lb)   BMI 27.25 kg/m    BMI= Body mass index is 27.25 kg/m .  Wt Readings from Last 3 Encounters:   08/22/23 78.9 kg (174 lb)   05/01/23 82.1 kg (181 lb)   03/31/23 78.9 kg (174 lb)       General Appearance:   no distress, normal body habitus   ENT/Mouth: membranes moist, no oral lesions or bleeding gums.      EYES:  no scleral icterus, normal conjunctivae   Neck: no carotid bruits or thyromegaly   Chest/Lungs:   lungs are clear to auscultation   Cardiovascular:   Regular. Normal first and second heart sounds with no murmur no edema bilaterally        Extremities: no cyanosis or clubbing   Skin: no xanthelasma, warm.    Neurologic: normal  bilateral, no tremors     Psychiatric: alert and oriented x3, calm        Please refer above for cardiac ROS details.        Medical History  Surgical History Family History Social History   Anterior STEMI status post PCI with drug-eluting stent to proximal/mid LAD  V-fib arrest  Heart failure with reduced ejection fraction  Ischemic cardiomyopathy LVEF 40-45%  Hyperlipidemia  Hypertension Past Surgical History:   Procedure Laterality Date     CHOLECYSTECTOMY       CV CORONARY ANGIOGRAM N/A 1/6/2023    Procedure: Coronary Angiogram;  Surgeon: Brandon Walker MD;  Location: [facility] CARDIAC CATH LAB     CV CORONARY ANGIOGRAM N/A 1/10/2023    Procedure: Coronary Angiogram;  Surgeon: Brandon Walker MD;  Location: [facility] CARDIAC CATH LAB     CV INTRAVASULAR ULTRASOUND N/A 1/6/2023    Procedure: Intravascular Ultrasound;  Surgeon: Brandon Walker MD;  Location: [facility] CARDIAC CATH LAB     CV PCI STENT DRUG ELUTING N/A 1/6/2023    Procedure: Percutaneous Coronary Intervention Stent;  Surgeon: Brandon Walker MD;  Location: [facility] CARDIAC CATH LAB          Social History "     Socioeconomic History     Marital status:      Spouse name: Not on file     Number of children: Not on file     Years of education: Not on file     Highest education level: Not on file   Occupational History     Not on file   Tobacco Use     Smoking status: Never     Smokeless tobacco: Never   Substance and Sexual Activity     Alcohol use: Yes     Drug use: No     Sexual activity: Not on file   Other Topics Concern     Not on file   Social History Narrative     Not on file     Social Determinants of Health     Financial Resource Strain: Not on file   Food Insecurity: Not on file   Transportation Needs: Not on file   Physical Activity: Not on file   Stress: Not on file   Social Connections: Not on file   Intimate Partner Violence: Not on file   Housing Stability: Not on file           Medications  Allergies   Current Outpatient Medications   Medication Sig Dispense Refill     aspirin 81 mg chewable tablet Take 81 mg by mouth daily       atorvastatin (LIPITOR) 40 MG tablet Take 1 tablet (40 mg) by mouth daily 90 tablet 3     famotidine (PEPCID) 20 MG tablet Take 20 mg by mouth 2 times daily       furosemide (LASIX) 40 MG tablet Take 0.5 tablets (20 mg) by mouth daily as needed (take 1 tablet as needed for weight gain >3 lbs with leg swelling, abdominal bloating or shortness of breath) 90 tablet 3     losartan (COZAAR) 100 MG tablet Take 1 tablet by mouth daily       metoprolol succinate ER (TOPROL XL) 25 MG 24 hr tablet Take 1 tablet (25 mg) by mouth At Bedtime (Patient taking differently: Take 25 mg by mouth At Bedtime) 90 tablet 3     multivitamin with minerals tablet Take 1 tablet by mouth daily       nitroGLYcerin (NITROSTAT) 0.4 MG sublingual tablet For chest pain place 1 tablet under the tongue every 5 minutes for 3 doses. If symptoms persist 5 minutes after 1st dose call 911. 30 tablet 1     Omeprazole 20 MG TBDD Take 20 mg by mouth daily       prasugrel (EFFIENT) 10 MG TABS tablet Take 1 tablet (10  mg) by mouth daily (Patient taking differently: Take 10 mg by mouth daily) 90 tablet 3     sildenafil (VIAGRA) 25 MG tablet Take 1 tablet (25 mg) by mouth daily as needed 10 tablet 0     tamsulosin (FLOMAX) 0.4 MG capsule Take 0.4 mg by mouth daily         Allergies   Allergen Reactions     Amoxicillin Unknown     Unsure, long time ago       Niacin Unknown     Shellfish Containing Products [Shellfish-Derived Products] Unknown     Lisinopril Cough          Lab Results    Chemistry/lipid CBC Cardiac Enzymes/BNP/TSH/INR   Recent Labs   Lab Test 03/02/23  0740   CHOL 100   HDL 42   LDL 39   TRIG 94     Recent Labs   Lab Test 03/02/23  0740 01/07/23  0041   LDL 39 47  47     Recent Labs   Lab Test 01/12/23  0525      POTASSIUM 3.7   CHLORIDE 101   CO2 21*   *   BUN 24.5*   CR 0.88   GFRESTIMATED 90   RUPA 8.5*     Recent Labs   Lab Test 01/12/23  0525 01/11/23  0513 01/10/23  0441   CR 0.88 0.84 0.74     Recent Labs   Lab Test 01/07/23  0041   A1C 5.7*          Recent Labs   Lab Test 01/12/23  0525   WBC 8.4   HGB 12.2*   HCT 36.5*   MCV 93        Recent Labs   Lab Test 01/12/23  0525 01/11/23  0513 01/10/23  0441   HGB 12.2* 12.0* 11.9*    Recent Labs   Lab Test 07/16/22  0724 06/17/19  0730 06/17/19  0433   TROPONINI <0.01 <0.01 <0.01     Recent Labs   Lab Test 01/07/23  0041   NTBNPI 512     Recent Labs   Lab Test 01/07/23  0041   TSH 3.23     Recent Labs   Lab Test 01/06/23  1949 07/16/22  0724 04/09/19  0812   INR 1.15 1.14 1.17*        Prema Saenz MD

## 2023-09-05 ENCOUNTER — ALLIED HEALTH/NURSE VISIT (OUTPATIENT)
Dept: CARDIOLOGY | Facility: CLINIC | Age: 73
End: 2023-09-05
Payer: COMMERCIAL

## 2023-09-05 ENCOUNTER — TELEPHONE (OUTPATIENT)
Dept: CARDIOLOGY | Facility: CLINIC | Age: 73
End: 2023-09-05

## 2023-09-05 VITALS — DIASTOLIC BLOOD PRESSURE: 70 MMHG | SYSTOLIC BLOOD PRESSURE: 136 MMHG

## 2023-09-05 DIAGNOSIS — Z01.30 BP CHECK: Primary | ICD-10-CM

## 2023-09-05 PROCEDURE — 99207 PR NO CHARGE NURSE ONLY: CPT | Performed by: INTERNAL MEDICINE

## 2023-09-05 NOTE — PROGRESS NOTES
Jordan Fisher was evaluated at [facility] on September 5, 2023 at which time his blood pressure was:    BP Readings from Last 3 Encounters:   09/05/23 136/70   08/22/23 (!) 142/70   05/01/23 138/70     Pulse Readings from Last 3 Encounters:   08/22/23 61   05/01/23 54   03/31/23 57       Reviewed lifestyle modifications for blood pressure control and reduction: including making healthy food choices, managing weight, getting regular exercise, smoking cessation, reducing alcohol consumption, monitoring blood pressure regularly.     Symptoms: None    BP Goal:< 140/90 mmHg    BP Assessment:  BP at goal    Potential Reasons for BP too high: NA - Not applicable    BP Follow-Up Plan: Referral to PCP    Recommendation to Provider: SUZAN BP Checked at [facility]    Note completed by: Ivan Borja, Pharm.D.

## 2023-09-05 NOTE — TELEPHONE ENCOUNTER
Called patient to review recent elevated blood pressure reading.  Per [place] Community Measures guidelines, patients blood pressure is out of parameters and recheck blood pressure is recommended.    Patient will go to a [facility] location close to his home and have his blood pressure checked as his home machine continuously shows his blood pressure to be high and the patient states his blood pressures are usually WNL. He will call the voicemail phone number to report his results.

## 2023-09-06 NOTE — TELEPHONE ENCOUNTER
Patient returned call and left voicemail message with update blood pressure reading.      Last Blood Pressure: 142/70  Last Heart Rate: 61  Date: 08/22/23  Location: [facility]    Today's Blood Pressure: 136/70  Today's Heart Rate: 09/05/23  Location: [facility]    Patient reported blood pressure updated in Epic. Blood pressure falls within [place] Community Measures guidelines.  Patient will follow up as previously advised.

## 2023-10-03 ENCOUNTER — LAB (OUTPATIENT)
Dept: CARDIOLOGY | Facility: CLINIC | Age: 73
End: 2023-10-03
Payer: COMMERCIAL

## 2023-10-03 DIAGNOSIS — E78.5 DYSLIPIDEMIA, GOAL LDL BELOW 70: ICD-10-CM

## 2023-10-03 LAB
CHOLEST SERPL-MCNC: 110 MG/DL
HDLC SERPL-MCNC: 47 MG/DL
LDLC SERPL CALC-MCNC: 50 MG/DL
NONHDLC SERPL-MCNC: 63 MG/DL
TRIGL SERPL-MCNC: 66 MG/DL

## 2023-10-03 PROCEDURE — 80061 LIPID PANEL: CPT

## 2023-10-03 PROCEDURE — 36415 COLL VENOUS BLD VENIPUNCTURE: CPT

## 2023-12-06 NOTE — Clinical Note
Conscious sedation is planned for this procedure.    Provider immediate assessment completed.  Pt returned call and states that his surgery is postponed until May. He will not be needing pre-op testing currently. Pt didn't have any further questions. [FreeTextEntry1] : Presented to patient's house for scheduled and confirmed home visit.  Dtr answered the door and informed me the patient  early this am.  Will update medical record.

## 2024-01-24 ENCOUNTER — TELEPHONE (OUTPATIENT)
Dept: CARDIOLOGY | Facility: CLINIC | Age: 74
End: 2024-01-24
Payer: COMMERCIAL

## 2024-01-24 NOTE — TELEPHONE ENCOUNTER
Per Dr. Saenz's 8-22-23 visit note:  Plan:  1.  Dual antiplatelet therapy with aspirin and Effient until January 2024 and then may stop the Effient.  2.  May decrease Lipitor to 40 mg daily and repeat fasting lipids in 2 to 3 months  3.  Continue on current doses of Toprol and losartan  4.  Follow-up in 1 year    Noted patient s/p PCI 1-10-23 - yrly follow-up pending 8/2024.  mg

## 2024-01-24 NOTE — TELEPHONE ENCOUNTER
Health Call Center    Phone Message    May a detailed message be left on voicemail: yes     Reason for Call: Medication Question or concern regarding medication   Prescription Clarification  Name of Medication:   prasugrel (EFFIENT) 10 MG TABS tablet    Prescribing Provider: Luciana Harrison    Pharmacy:    [facility] [place], [place] - [address] [address] [facility] - [place], [place] - [address]     What on the order needs clarification? [facility] see if a ref[facility] is needed on this medication as they can only see orders were needed for 1 year which is up. Please call back to discuss if medication is still needed. If it is they will need a refill.       Action Taken: Other: cardiology     Travel Screening: Not Applicable                                                                  Thank you!  [facility]

## 2024-01-24 NOTE — TELEPHONE ENCOUNTER
Please address pharmacy question of timing Effient can be discontinued and dose of ASA patient should continue to take.  mg

## 2024-01-26 NOTE — TELEPHONE ENCOUNTER
Msg rec'd 1-25-24 @ 1643:  Prema Saenz MD Gorshe, Maureen, MEMO  Aspirin 81 mg daily and may stop effient.    Phone call to patient - informed him of Dr. Saenz's response / recommendations - patient verbalized understanding that he can stop Effient once his present Rx is completed and no ref[facility] - patient offered additional questions that were addressed and will plan to follow-up 8/2024, sooner if needed.  mg

## 2024-03-03 ENCOUNTER — HEALTH MAINTENANCE LETTER (OUTPATIENT)
Age: 74
End: 2024-03-03

## 2024-04-10 ENCOUNTER — ALLIED HEALTH/NURSE VISIT (OUTPATIENT)
Dept: CARDIOLOGY | Facility: CLINIC | Age: 74
End: 2024-04-10
Payer: COMMERCIAL

## 2024-04-10 VITALS — SYSTOLIC BLOOD PRESSURE: 115 MMHG | DIASTOLIC BLOOD PRESSURE: 75 MMHG

## 2024-04-10 DIAGNOSIS — Z01.30 BP CHECK: Primary | ICD-10-CM

## 2024-04-10 PROCEDURE — 99207 PR NO CHARGE NURSE ONLY: CPT | Performed by: NURSE PRACTITIONER

## 2024-04-10 NOTE — PROGRESS NOTES
Jordan Fisher was evaluated at [facility] on April 10, 2024 at which time his blood pressure was:    BP Readings from Last 1 Encounters:   04/10/24 115/75     No data recorded      Reviewed lifestyle modifications for blood pressure control and reduction: including making healthy food choices, managing weight, getting regular exercise, smoking cessation, reducing alcohol consumption, monitoring blood pressure regularly.     Symptoms: None    BP Goal:< 140/90 mmHg    BP Assessment:  BP at goal    Potential Reasons for BP too high: NA - Not applicable    BP Follow-Up Plan: Recheck BP in 6 months at pharmacy    Recommendation to Provider: Patient reports to pharmacy for BP check. Patient is stable on BP medications. BP at goal, no further recommendations    Note completed by: Palak Chun RP

## 2024-10-22 ENCOUNTER — OFFICE VISIT (OUTPATIENT)
Dept: CARDIOLOGY | Facility: CLINIC | Age: 74
End: 2024-10-22
Payer: COMMERCIAL

## 2024-10-22 VITALS
SYSTOLIC BLOOD PRESSURE: 110 MMHG | HEART RATE: 76 BPM | HEIGHT: 67 IN | DIASTOLIC BLOOD PRESSURE: 74 MMHG | RESPIRATION RATE: 16 BRPM | BODY MASS INDEX: 28.09 KG/M2 | WEIGHT: 179 LBS

## 2024-10-22 DIAGNOSIS — I10 PRIMARY HYPERTENSION: ICD-10-CM

## 2024-10-22 DIAGNOSIS — I10 ESSENTIAL HYPERTENSION: ICD-10-CM

## 2024-10-22 DIAGNOSIS — I25.10 CORONARY ARTERY DISEASE INVOLVING NATIVE CORONARY ARTERY OF NATIVE HEART WITHOUT ANGINA PECTORIS: ICD-10-CM

## 2024-10-22 DIAGNOSIS — E78.5 DYSLIPIDEMIA, GOAL LDL BELOW 70: ICD-10-CM

## 2024-10-22 DIAGNOSIS — I35.1 MILD AORTIC VALVE REGURGITATION: ICD-10-CM

## 2024-10-22 DIAGNOSIS — I25.5 ISCHEMIC CARDIOMYOPATHY: ICD-10-CM

## 2024-10-22 DIAGNOSIS — I50.20 HFREF (HEART FAILURE WITH REDUCED EJECTION FRACTION) (H): Primary | ICD-10-CM

## 2024-10-22 PROBLEM — I07.1 MILD TRICUSPID VALVE REGURGITATION: Status: RESOLVED | Noted: 2021-03-18 | Resolved: 2024-10-22

## 2024-10-22 PROBLEM — I50.22 CHRONIC SYSTOLIC HEART FAILURE (H): Status: RESOLVED | Noted: 2023-01-31 | Resolved: 2024-10-22

## 2024-10-22 PROBLEM — I34.0 MILD MITRAL VALVE REGURGITATION: Status: RESOLVED | Noted: 2021-03-18 | Resolved: 2024-10-22

## 2024-10-22 PROBLEM — I25.83 CORONARY ARTERY DISEASE DUE TO LIPID RICH PLAQUE: Status: RESOLVED | Noted: 2023-05-01 | Resolved: 2024-10-22

## 2024-10-22 LAB
ANION GAP SERPL CALCULATED.3IONS-SCNC: 14 MMOL/L (ref 7–15)
BUN SERPL-MCNC: 33.3 MG/DL (ref 8–23)
CALCIUM SERPL-MCNC: 9.1 MG/DL (ref 8.8–10.4)
CHLORIDE SERPL-SCNC: 101 MMOL/L (ref 98–107)
CREAT SERPL-MCNC: 1.06 MG/DL (ref 0.67–1.17)
EGFRCR SERPLBLD CKD-EPI 2021: 73 ML/MIN/1.73M2
GLUCOSE SERPL-MCNC: 103 MG/DL (ref 70–99)
HCO3 SERPL-SCNC: 23 MMOL/L (ref 22–29)
POTASSIUM SERPL-SCNC: 4.2 MMOL/L (ref 3.4–5.3)
SODIUM SERPL-SCNC: 138 MMOL/L (ref 135–145)

## 2024-10-22 PROCEDURE — G2211 COMPLEX E/M VISIT ADD ON: HCPCS | Performed by: NURSE PRACTITIONER

## 2024-10-22 PROCEDURE — 99215 OFFICE O/P EST HI 40 MIN: CPT | Performed by: NURSE PRACTITIONER

## 2024-10-22 PROCEDURE — 80048 BASIC METABOLIC PNL TOTAL CA: CPT | Performed by: NURSE PRACTITIONER

## 2024-10-22 PROCEDURE — 36415 COLL VENOUS BLD VENIPUNCTURE: CPT | Performed by: NURSE PRACTITIONER

## 2024-10-22 RX ORDER — HYDROCHLOROTHIAZIDE 25 MG/1
1 TABLET ORAL DAILY
COMMUNITY
Start: 2024-10-03 | End: 2025-10-03

## 2024-10-22 RX ORDER — ATORVASTATIN CALCIUM 80 MG/1
80 TABLET, FILM COATED ORAL DAILY
COMMUNITY

## 2024-10-22 RX ORDER — SUCRALFATE 1 G/1
1 TABLET ORAL 4 TIMES DAILY PRN
COMMUNITY
Start: 2024-04-11

## 2024-10-22 RX ORDER — NITROGLYCERIN 0.4 MG/1
TABLET SUBLINGUAL
Qty: 30 TABLET | Refills: 0 | Status: SHIPPED | OUTPATIENT
Start: 2024-10-22

## 2024-10-22 NOTE — PROGRESS NOTES
Assessment/Recommendations   Assessment:    1.  Ischemic cardiomyopathy, heart failure with improved ejection fraction with LVEF of 60 to 65% per echo in May 2023-NYHA class I: Previous echo with LVEF of 40 to 45% per echo in January 2023.    Patient experienced some weight gain and shortness of breath on exertion in the past 6 months due to poor eating habit.    He underwent MPI stress test negative for inducible myocardial ischemia.  His NT proBNP was found within normal limit.    Current heart failure regimen includes  On beta-blocker therapy with metoprolol succinate 25 mg daily at bedtime  On ARB therapy with losartan 100 mg daily in a.m.  On diuretic therapy with furosemide 20 mg as needed.   Not utilized since early  this year.      Patient is well compensated on exam.  Dyspnea on exertion and lower extremity edema has resolved since on hydrochlorothiazide    2. Coronary artery disease with status post STEMI complicated by ventricular fibrillation with an cardiac arrest requiring requiring defibrillation with ROSC/dyslipidemia with LDL goal less than 70: Status post PCI/SARITHA to proximal to mid LAD on 1/6/2023.  Repeat coronary angiogram on 1/10/2023 showed patent stents.      Most recent LDL is at goal    MPI Nuclear stress test negative for inducible myocardial ischemic.  Stress test also showed findings consistent with prior inferior apical myocardial infarction.  He was unable to get appointment with Dr. Saenz and therefore saw cardiology at [facility].  Note reviewed.      No chest pain.    # Hypertension: BP stable since on hydrochlorothiazide- started couple weeks ago.  Denies adverse effects.    # Mild aortic stenosis per echo in May 2023: Stable.    Plan:  -BMP pending  -Nitro prescription renewed per patient's request.  Patient reported has not utilized since he saw Dr. Saenz last time.  Discussed about the serious drug interaction between nitroglycerin and sildenafil.  Patient verbalized  understanding.  - Instructed to avoid taking nitroglycerin 24 to 48 hours after taking sildenafil.  -Given patient is asymptomatic and euvolemic on exam, no further cardiac evaluation required at this time.  However, patient was highly encouraged to contact us if develop persistent weight gain with heart failure symptoms.  -Continue heart failure and heart healthy diet and regular exercise as tolerated    Follow-up with Dr. Saenz as scheduled 6 months. Pt prefers to follow up with me in as needed.     The longitudinal plan of care for heart failure with improved ejection fraction, ischemic cardiomyopathy, coronary disease, and hypertension was addressed during this visit.?Due to the added   complexity in care, I will continue to support Jordan Fisher in the subsequent management of this   condition(s) and with the ongoing continuity of care of this condition(s)       History of Present Illness/Subjective    Mr. Jordan Fisher is a 74 year old male with a past medical history of Ambrose's esophagus, carotid artery disease, hypertension, hyperlipidemia, overweight, and recent hospitalization with STEMI involving anterior wall, V. fib and cardiac arrest requiring defibrillation and ROSC, status post PCI to mid LAD,, dyslipidemia, ischemic cardiomyopathy, and heart failure with improved ejection fraction who is seen at [facility] for for continued heart failure follow-up.    Today, Jordan denies recurrent shortness of breath since he saw cardiology at [facility] and had stress test.  His leg swelling has resolved.  He does get mild swelling in his legs more at the end of the day.  He wears compression stocking.  He denies fatigue, lightheadedness, shortness of breath, dyspnea on exertion, orthopnea, PND, palpitations, chest pain, abdominal fullness/bloating, and lower extremity edema.      Patient reports following heart healthy low-sodium diet.  He remains active.    NM Mpi  with lexiscan from 9/2024- external report  Order: [id number]  Narrative  Summary    1. Exercise stress myocardial perfusion imaging performed.    2. The patient exercised for 8 minute(s) on the Saeed protocol and achieved 88% of the maximally predicted heart rate, corresponding to above-normal functional capacity for age.    3. The patient experienced dyspnea during the stress test.    4. Resting ECG with baseline ST-T abnormalities. Stress ECG negative for myocardial ischemia relative to baseline.    5. Normal left ventricular chamber size with subtle hypokinesis of the apical inferior segment and normal calculated ejection fraction (62%).    6. Myocardial perfusion imaging is abnormal with a small, moderate-intensity apical inferior perfusion defect on stress supine and prone imaging that improves mildly at rest. Findings are consistent with prior inferior apical myocardial infarction  and a small area of stress-induced paul-infarct ischemia (in either a distal wrap-around left anterior descending coronary artery distribution or posterior descending artery territory). Additional diaphragmatic attenuation is noted, resolving on  prone imaging.    Prior Study Comparison    No prior study for comparison.    ECHO from 5/2023-reviewed:  Interpretation Summary   1.Left ventricular size, wall motion and function are normal. The ejection  fraction is 60-65%.  2.There is mild septal hypokinesis.  3.Normal right ventricle size and systolic function.  4.Thickened aortic valve leaflets There is mild (1+) aortic  regurgitation.Decel 1/2 time is 525 msec.  5.The ascending aorta is Borderline dilated.  Compared to the prior study dated 1/8/2023, the LV has normalized and the WMA  has resolved.    Coronary Angiogram on 1/10/23: reviewed:  Conclusion  Widely patent stent in prox-to-mid LAD.  Mild non obstructive disease in RCA and LCx (unchanged from prior angiogram on 1/6/2023).  No new obstructive lesions noted.    Chest wall  pain secondary to CPR during recent cardiac arrest.      Plan   Follow bedrest per protocol   Continued medical management and lifestyle modifications for cardiovascular risk factor optimizations.   Follow up visit with Nurse Practitioner in 1-2 weeks.   Arterial sheath removed from radial artery with TR band placement.   Cardiac rehabilitation.   Return to the primary inpatient team for further evaluation and managmenet    1. DAPT x at least 12 months  2. High intensity statin     Cor Angio on 1/6/23Conclusion  Anterior STEMI.  Single vessel severe CAD.  99% thrombotic lesion in prox-to-mid LAD s/p successful PCI with SARITHA x 1 (Synergy 3.0 x 38 mm SARITHA, post dilated with 3.5 mm balloon); mild disease in RCA and LCx.  Successful closure of right CFA access with 6F Angioseal.        Plan   Follow bedrest per protocol   Continued medical management and lifestyle modifications for cardiovascular risk factor optimizations.   Arterial sheath removed from femoral artery with closure device.   Femoral angiogram identifies arterial sheath placement suitable for closure device.   Admit to inpatient    1. DAPT with ASA/Ticagrelor x 12 months, ASA monotherapy indefinitely thereafter.  2. High intensity statin  3. Echocardiogram  4. Beta blocker once off pressors and hemodynamically stable  5. Aggressive risk factor modification and medical management of coronary artery disease.  6. Bed rest x 3 hours with femoral access site monitoring as per protocol.     ECHO 1/8/23-Reviewed:   Interpretation Summary  Limited TTE to evaluate for LV function/wall motion.     Left ventricular function is decreased. The ejection fraction is 40-45%  (mildly reduced). There is a pattern of wall motion abnormalities that is  consistent with a LAD culprit infarction.  Global right ventricular function is normal. This study was compared with the  study from 01/07/2023. No significant changes noted.     Physical Examination Review of Systems   BP  "110/74 (BP Location: Left arm, Patient Position: Sitting, Cuff Size: Adult Regular)   Pulse 76   Resp 16   Ht 1.702 m (5' 7\")   Wt 81.2 kg (179 lb)   BMI 28.04 kg/m    Body mass index is 28.04 kg/m .  Wt Readings from Last 3 Encounters:   10/22/24 81.2 kg (179 lb)   08/22/23 78.9 kg (174 lb)   05/01/23 82.1 kg (181 lb)     General Appearance:   no distress, normal body habitus   ENT/Mouth: membranes moist, no oral lesions or bleeding gums.      EYES:  no scleral icterus, normal conjunctivae   Neck: no carotid bruits or thyromegaly   Chest/Lungs:   lungs are clear to auscultation, no rales or wheezing, equal chest wall expansion    Cardiovascular:    Heart rate regular; Normal first and second heart sounds with no murmurs, rubs, or gallops;  Jugular venous pressure flat, no edema bilaterally    Abdomen:  no organomegaly, masses, bruits, or tenderness; bowel sounds are present   Extremities   no cyanosis or clubbing;  CMS intact.   Skin: no xanthelasma, warm.    Neurologic: no tremors   Psychiatric: alert and oriented x3, calm                                                    Negative unless noted in HPI     Medical History  Surgical History Family History Social History   No past medical history on file. Past Surgical History:   Procedure Laterality Date    CHOLECYSTECTOMY      CV CORONARY ANGIOGRAM N/A 1/6/2023    Procedure: Coronary Angiogram;  Surgeon: Brandon Walker MD;  Location: [facility] CARDIAC CATH LAB    CV CORONARY ANGIOGRAM N/A 1/10/2023    Procedure: Coronary Angiogram;  Surgeon: Brandon Walker MD;  Location: [facility] CARDIAC CATH LAB    CV INTRAVASULAR ULTRASOUND N/A 1/6/2023    Procedure: Intravascular Ultrasound;  Surgeon: Brandon Walker MD;  Location: [facility] CARDIAC CATH LAB    CV PCI STENT DRUG ELUTING N/A 1/6/2023    Procedure: Percutaneous Coronary Intervention Stent;  Surgeon: Brandon Walker MD;  Location: [facility] CARDIAC " CATH LAB    No family history on file. Social History     Socioeconomic History    Marital status:      Spouse name: Not on file    Number of children: Not on file    Years of education: Not on file    Highest education level: Not on file   Occupational History    Not on file   Tobacco Use    Smoking status: Never    Smokeless tobacco: Never   Substance and Sexual Activity    Alcohol use: Yes    Drug use: No    Sexual activity: Not on file   Other Topics Concern    Not on file   Social History Narrative    Not on file     Social Determinants of Health     Financial Resource Strain: Low Risk  (8/13/2021)    Received from [facility]    Overall Financial Resource Strain (CARDIA)     Difficulty of Paying Living Expenses: Not very hard   Food Insecurity: No Food Insecurity (8/13/2021)    Received from [facility]    Hunger Vital Sign     Worried About Running Out of Food in the Last Year: Never true     Ran Out of Food in the Last Year: Never true   Transportation Needs: No Transportation Needs (8/13/2021)    Received from [facility]    PRAPARE - Transportation     Lack of Transportation (Medical): No     Lack of Transportation (Non-Medical): No   Physical Activity: Sufficiently Active (8/13/2021)    Received from [facility]    Exercise Vital Sign     Days of Exercise per Week: 5 days     Minutes of Exercise per Session: 70 min   Stress: Stress Concern Present (8/13/2021)    Received from [facility]    [place] [employer] - Occupational Stress Questionnaire     Feeling of Stress : To some extent   Social Connections: Moderately Integrated (8/13/2021)    Received from [facility]    Social Connection and Isolation Panel [NHANES]     Frequency of Communication with Friends and Family: More than three times a week     Frequency of Social Gatherings with Friends and Family: Twice a week     Attends Gnosticist Services: 1 to 4 times per year     Active Member of Clubs or Organizations: No      Attends Club or Organization Meetings: Never     Marital Status:    Interpersonal Safety: Not At Risk (8/13/2021)    Received from [facility]    Humiliation, Afraid, Rape, and Kick questionnaire     Fear of Current or Ex-Partner: No     Emotionally Abused: No     Physically Abused: No     Sexually Abused: No   Housing Stability: Low Risk  (8/13/2021)    Received from [facility]    Housing Stability Vital Sign     Unable to Pay for Housing in the Last Year: No     In the last 12 months, how many places have you lived?: 1     In the last 12 months, was there a time when you did not have a steady place to sleep or slept in a shelter (including now)?: No          Medications  Allergies   Current Outpatient Medications   Medication Sig Dispense Refill    aspirin 81 mg chewable tablet Take 81 mg by mouth daily      atorvastatin (LIPITOR) 80 MG tablet Take 80 mg by mouth daily.      famotidine (PEPCID) 20 MG tablet Take 20 mg by mouth every evening.      furosemide (LASIX) 40 MG tablet Take 0.5 tablets (20 mg) by mouth daily as needed (take 1 tablet as needed for weight gain >3 lbs with leg swelling, abdominal bloating or shortness of breath) 90 tablet 3    hydrochlorothiazide (HYDRODIURIL) 25 MG tablet Take 1 tablet by mouth daily.      losartan (COZAAR) 100 MG tablet Take 1 tablet by mouth daily      metoprolol succinate ER (TOPROL XL) 25 MG 24 hr tablet Take 1 tablet (25 mg) by mouth At Bedtime (Patient taking differently: Take 25 mg by mouth at bedtime.) 90 tablet 3    Multiple Vitamins-Minerals (PRESERVISION AREDS 2 PO) Take 1 capsule by mouth 2 times daily.      nitroGLYcerin (NITROSTAT) 0.4 MG sublingual tablet For chest pain place 1 tablet under the tongue every 5 minutes for 3 doses. If symptoms persist 5 minutes after 1st dose call 911. 30 tablet 0    Omeprazole 20 MG TBDD Take 20 mg by mouth every morning.      sildenafil (VIAGRA) 25 MG tablet Take 1 tablet (25 mg) by mouth daily as needed 10 tablet 0     sucralfate (CARAFATE) 1 GM tablet Take 1 g by mouth 4 times daily as needed (Heartburn).      tamsulosin (FLOMAX) 0.4 MG capsule Take 0.4 mg by mouth daily      Allergies   Allergen Reactions    Amoxicillin Unknown     Unsure, long time ago      Niacin Unknown    Shellfish Containing Products [Shellfish-Derived Products] Unknown    Lisinopril Cough         Lab Results    Chemistry/lipid CBC Cardiac Enzymes/BNP/TSH/INR   Lab Results   Component Value Date    CHOL 110 10/03/2023    HDL 47 10/03/2023    TRIG 66 10/03/2023    BUN 24.5 (H) 01/12/2023     01/12/2023    CO2 21 (L) 01/12/2023    Lab Results   Component Value Date    WBC 8.4 01/12/2023    HGB 12.2 (L) 01/12/2023    HCT 36.5 (L) 01/12/2023    MCV 93 01/12/2023     01/12/2023    Lab Results   Component Value Date    TROPONINI <0.01 07/16/2022    TSH 3.23 01/07/2023    INR 1.15 01/06/2023        40 minutes spent on the date of encounter doing chart review, review of test results, interpretation with above tests, patient visit, and documentation.        This note has been dictated using voice recognition software. Any grammatical, typographical, or context distortions are unintentional and inherent to the software

## 2024-10-22 NOTE — PATIENT INSTRUCTIONS
Jordan Fisher,    It was a pleasure to see you today at the [facility].     My recommendations after this visit include:    - No medications changes made today    - We will follow up with your lab result    - Avoid taking nitro within 24 to 48 hours after taking to avoid serious drug interaction    - Low sodium diet < 2000 mg/day, daily weight monitoring, and maintain adequate fluid intake at 50 to 60 ounces per day    -Please call if you experience persistent weight gain 2 to 3 pounds 2 days in a row or 5 pounds in a week with shortness of breath, abdominal bloating and leg swelling    - Follow up with Dr. Saenz in 6 months     - Please call Heart Failure Nurse Line at [phone], if you have any questions or concerns

## 2024-10-22 NOTE — LETTER
10/22/2024    Wen Mederos MD  [facility] [address]  [facility] [address]    RE: Jordan Fisher       Dear Colleague,     I had the pleasure of seeing Jordan Fisher in the [facility].          Assessment/Recommendations   Assessment:    1.  Ischemic cardiomyopathy, heart failure with improved ejection fraction with LVEF of 60 to 65% per echo in May 2023-NYHA class I: Previous echo with LVEF of 40 to 45% per echo in January 2023.    Patient experienced some weight gain and shortness of breath on exertion in the past 6 months due to poor eating habit.    He underwent MPI stress test negative for inducible myocardial ischemia.  His NT proBNP was found within normal limit.    Current heart failure regimen includes  On beta-blocker therapy with metoprolol succinate 25 mg daily at bedtime  On ARB therapy with losartan 100 mg daily in a.m.  On diuretic therapy with furosemide 20 mg as needed.   Not utilized since early  this year.      Patient is well compensated on exam.  Dyspnea on exertion and lower extremity edema has resolved since on hydrochlorothiazide    2. Coronary artery disease with status post STEMI complicated by ventricular fibrillation with an cardiac arrest requiring requiring defibrillation with ROSC/dyslipidemia with LDL goal less than 70: Status post PCI/SARITHA to proximal to mid LAD on 1/6/2023.  Repeat coronary angiogram on 1/10/2023 showed patent stents.      Most recent LDL is at goal    MPI Nuclear stress test negative for inducible myocardial ischemic.  Stress test also showed findings consistent with prior inferior apical myocardial infarction.  He was unable to get appointment with Dr. Saenz and therefore saw cardiology at [facility].  Note reviewed.      No chest pain.    # Hypertension: BP stable since on hydrochlorothiazide- started couple weeks ago.  Denies adverse effects.    # Mild aortic stenosis per echo in May 2023:  Stable.    Plan:  -BMP pending  -Nitro prescription renewed per patient's request.  Patient reported has not utilized since he saw Dr. Saenz last time.  Discussed about the serious drug interaction between nitroglycerin and sildenafil.  Patient verbalized understanding.  - Instructed to avoid taking nitroglycerin 24 to 48 hours after taking sildenafil.  -Given patient is asymptomatic and euvolemic on exam, no further cardiac evaluation required at this time.  However, patient was highly encouraged to contact us if develop persistent weight gain with heart failure symptoms.  -Continue heart failure and heart healthy diet and regular exercise as tolerated    Follow-up with Dr. Saenz as scheduled 6 months. Pt prefers to follow up with me in as needed.     The longitudinal plan of care for heart failure with improved ejection fraction, ischemic cardiomyopathy, coronary disease, and hypertension was addressed during this visit.?Due to the added   complexity in care, I will continue to support Jordan Fisher in the subsequent management of this   condition(s) and with the ongoing continuity of care of this condition(s)       History of Present Illness/Subjective    Mr. Jordan Fisher is a 74 year old male with a past medical history of Ambrose's esophagus, carotid artery disease, hypertension, hyperlipidemia, overweight, and recent hospitalization with STEMI involving anterior wall, V. fib and cardiac arrest requiring defibrillation and ROSC, status post PCI to mid LAD,, dyslipidemia, ischemic cardiomyopathy, and heart failure with improved ejection fraction who is seen at [facility] for for continued heart failure follow-up.    Today, Jordan denies recurrent shortness of breath since he saw cardiology at [facility] and had stress test.  His leg swelling has resolved.  He does get mild swelling in his legs more at the end of the day.  He wears compression stocking.  He denies  fatigue, lightheadedness, shortness of breath, dyspnea on exertion, orthopnea, PND, palpitations, chest pain, abdominal fullness/bloating, and lower extremity edema.      Patient reports following heart healthy low-sodium diet.  He remains active.    NM Mpi with lexiscan from 9/2024- external report  Order: [id number]  Narrative  Summary    1. Exercise stress myocardial perfusion imaging performed.    2. The patient exercised for 8 minute(s) on the Saeed protocol and achieved 88% of the maximally predicted heart rate, corresponding to above-normal functional capacity for age.    3. The patient experienced dyspnea during the stress test.    4. Resting ECG with baseline ST-T abnormalities. Stress ECG negative for myocardial ischemia relative to baseline.    5. Normal left ventricular chamber size with subtle hypokinesis of the apical inferior segment and normal calculated ejection fraction (62%).    6. Myocardial perfusion imaging is abnormal with a small, moderate-intensity apical inferior perfusion defect on stress supine and prone imaging that improves mildly at rest. Findings are consistent with prior inferior apical myocardial infarction  and a small area of stress-induced paul-infarct ischemia (in either a distal wrap-around left anterior descending coronary artery distribution or posterior descending artery territory). Additional diaphragmatic attenuation is noted, resolving on  prone imaging.    Prior Study Comparison    No prior study for comparison.    ECHO from 5/2023-reviewed:  Interpretation Summary   1.Left ventricular size, wall motion and function are normal. The ejection  fraction is 60-65%.  2.There is mild septal hypokinesis.  3.Normal right ventricle size and systolic function.  4.Thickened aortic valve leaflets There is mild (1+) aortic  regurgitation.Decel 1/2 time is 525 msec.  5.The ascending aorta is Borderline dilated.  Compared to the prior study dated 1/8/2023, the LV has normalized and the  WMA  has resolved.    Coronary Angiogram on 1/10/23: reviewed:  Conclusion  Widely patent stent in prox-to-mid LAD.  Mild non obstructive disease in RCA and LCx (unchanged from prior angiogram on 1/6/2023).  No new obstructive lesions noted.    Chest wall pain secondary to CPR during recent cardiac arrest.      Plan    Follow bedrest per protocol    Continued medical management and lifestyle modifications for cardiovascular risk factor optimizations.    Follow up visit with Nurse Practitioner in 1-2 weeks.    Arterial sheath removed from radial artery with TR band placement.    Cardiac rehabilitation.    Return to the primary inpatient team for further evaluation and managmenet    1. DAPT x at least 12 months  2. High intensity statin     Cor Angio on 1/6/23Conclusion  Anterior STEMI.  Single vessel severe CAD.  99% thrombotic lesion in prox-to-mid LAD s/p successful PCI with SARITHA x 1 (Synergy 3.0 x 38 mm SARITHA, post dilated with 3.5 mm balloon); mild disease in RCA and LCx.  Successful closure of right CFA access with 6F Angioseal.        Plan    Follow bedrest per protocol    Continued medical management and lifestyle modifications for cardiovascular risk factor optimizations.    Arterial sheath removed from femoral artery with closure device.    Femoral angiogram identifies arterial sheath placement suitable for closure device.    Admit to inpatient    1. DAPT with ASA/Ticagrelor x 12 months, ASA monotherapy indefinitely thereafter.  2. High intensity statin  3. Echocardiogram  4. Beta blocker once off pressors and hemodynamically stable  5. Aggressive risk factor modification and medical management of coronary artery disease.  6. Bed rest x 3 hours with femoral access site monitoring as per protocol.     ECHO 1/8/23-Reviewed:   Interpretation Summary  Limited TTE to evaluate for LV function/wall motion.     Left ventricular function is decreased. The ejection fraction is 40-45%  (mildly reduced). There is a pattern  "of wall motion abnormalities that is  consistent with a LAD culprit infarction.  Global right ventricular function is normal. This study was compared with the  study from 01/07/2023. No significant changes noted.     Physical Examination Review of Systems   /74 (BP Location: Left arm, Patient Position: Sitting, Cuff Size: Adult Regular)   Pulse 76   Resp 16   Ht 1.702 m (5' 7\")   Wt 81.2 kg (179 lb)   BMI 28.04 kg/m    Body mass index is 28.04 kg/m .  Wt Readings from Last 3 Encounters:   10/22/24 81.2 kg (179 lb)   08/22/23 78.9 kg (174 lb)   05/01/23 82.1 kg (181 lb)     General Appearance:   no distress, normal body habitus   ENT/Mouth: membranes moist, no oral lesions or bleeding gums.      EYES:  no scleral icterus, normal conjunctivae   Neck: no carotid bruits or thyromegaly   Chest/Lungs:   lungs are clear to auscultation, no rales or wheezing, equal chest wall expansion    Cardiovascular:    Heart rate regular; Normal first and second heart sounds with no murmurs, rubs, or gallops;  Jugular venous pressure flat, no edema bilaterally    Abdomen:  no organomegaly, masses, bruits, or tenderness; bowel sounds are present   Extremities   no cyanosis or clubbing;  CMS intact.   Skin: no xanthelasma, warm.    Neurologic: no tremors   Psychiatric: alert and oriented x3, calm                                                    Negative unless noted in HPI     Medical History  Surgical History Family History Social History   No past medical history on file. Past Surgical History:   Procedure Laterality Date     CHOLECYSTECTOMY       CV CORONARY ANGIOGRAM N/A 1/6/2023    Procedure: Coronary Angiogram;  Surgeon: Brandon Walker MD;  Location: [facility] CARDIAC CATH LAB     CV CORONARY ANGIOGRAM N/A 1/10/2023    Procedure: Coronary Angiogram;  Surgeon: Brandon Walker MD;  Location: [facility] CARDIAC CATH LAB     CV INTRAVASULAR ULTRASOUND N/A 1/6/2023    Procedure: Intravascular " Ultrasound;  Surgeon: Brandon Walker MD;  Location:  HEART CARDIAC CATH LAB     CV PCI STENT DRUG ELUTING N/A 1/6/2023    Procedure: Percutaneous Coronary Intervention Stent;  Surgeon: Brandon Walker MD;  Location:  HEART CARDIAC CATH LAB    No family history on file. Social History     Socioeconomic History     Marital status:      Spouse name: Not on file     Number of children: Not on file     Years of education: Not on file     Highest education level: Not on file   Occupational History     Not on file   Tobacco Use     Smoking status: Never     Smokeless tobacco: Never   Substance and Sexual Activity     Alcohol use: Yes     Drug use: No     Sexual activity: Not on file   Other Topics Concern     Not on file   Social History Narrative     Not on file     Social Determinants of Health     Financial Resource Strain: Low Risk  (8/13/2021)    Received from [facility]    Overall Financial Resource Strain (CARDIA)      Difficulty of Paying Living Expenses: Not very hard   Food Insecurity: No Food Insecurity (8/13/2021)    Received from [facility]    Hunger Vital Sign      Worried About Running Out of Food in the Last Year: Never true      Ran Out of Food in the Last Year: Never true   Transportation Needs: No Transportation Needs (8/13/2021)    Received from [facility]    PRAPARE - Transportation      Lack of Transportation (Medical): No      Lack of Transportation (Non-Medical): No   Physical Activity: Sufficiently Active (8/13/2021)    Received from [facility]    Exercise Vital Sign      Days of Exercise per Week: 5 days      Minutes of Exercise per Session: 70 min   Stress: Stress Concern Present (8/13/2021)    Received from [facility]    [employer] - Occupational Stress Questionnaire      Feeling of Stress : To some extent   Social Connections: Moderately Integrated (8/13/2021)    Received from [facility]    Social Connection and  Isolation Panel [NHANES]      Frequency of Communication with Friends and Family: More than three times a week      Frequency of Social Gatherings with Friends and Family: Twice a week      Attends [employer] Services: 1 to 4 times per year      Active Member of Clubs or Organizations: No      Attends Club or Organization Meetings: Never      Marital Status:    Interpersonal Safety: Not At Risk (8/13/2021)    Received from [facility]    Humiliation, Afraid, Rape, and Kick questionnaire      Fear of Current or Ex-Partner: No      Emotionally Abused: No      Physically Abused: No      Sexually Abused: No   Housing Stability: Low Risk  (8/13/2021)    Received from [facility]    Housing Stability Vital Sign      Unable to Pay for Housing in the Last Year: No      In the last 12 months, how many places have you lived?: 1      In the last 12 months, was there a time when you did not have a steady place to sleep or slept in a shelter (including now)?: No          Medications  Allergies   Current Outpatient Medications   Medication Sig Dispense Refill     aspirin 81 mg chewable tablet Take 81 mg by mouth daily       atorvastatin (LIPITOR) 80 MG tablet Take 80 mg by mouth daily.       famotidine (PEPCID) 20 MG tablet Take 20 mg by mouth every evening.       furosemide (LASIX) 40 MG tablet Take 0.5 tablets (20 mg) by mouth daily as needed (take 1 tablet as needed for weight gain >3 lbs with leg swelling, abdominal bloating or shortness of breath) 90 tablet 3     hydrochlorothiazide (HYDRODIURIL) 25 MG tablet Take 1 tablet by mouth daily.       losartan (COZAAR) 100 MG tablet Take 1 tablet by mouth daily       metoprolol succinate ER (TOPROL XL) 25 MG 24 hr tablet Take 1 tablet (25 mg) by mouth At Bedtime (Patient taking differently: Take 25 mg by mouth at bedtime.) 90 tablet 3     Multiple Vitamins-Minerals (PRESERVISION AREDS 2 PO) Take 1 capsule by mouth 2 times daily.       nitroGLYcerin (NITROSTAT) 0.4 MG  sublingual tablet For chest pain place 1 tablet under the tongue every 5 minutes for 3 doses. If symptoms persist 5 minutes after 1st dose call 911. 30 tablet 0     Omeprazole 20 MG TBDD Take 20 mg by mouth every morning.       sildenafil (VIAGRA) 25 MG tablet Take 1 tablet (25 mg) by mouth daily as needed 10 tablet 0     sucralfate (CARAFATE) 1 GM tablet Take 1 g by mouth 4 times daily as needed (Heartburn).       tamsulosin (FLOMAX) 0.4 MG capsule Take 0.4 mg by mouth daily      Allergies   Allergen Reactions     Amoxicillin Unknown     Unsure, long time ago       Niacin Unknown     Shellfish Containing Products [Shellfish-Derived Products] Unknown     Lisinopril Cough         Lab Results    Chemistry/lipid CBC Cardiac Enzymes/BNP/TSH/INR   Lab Results   Component Value Date    CHOL 110 10/03/2023    HDL 47 10/03/2023    TRIG 66 10/03/2023    BUN 24.5 (H) 01/12/2023     01/12/2023    CO2 21 (L) 01/12/2023    Lab Results   Component Value Date    WBC 8.4 01/12/2023    HGB 12.2 (L) 01/12/2023    HCT 36.5 (L) 01/12/2023    MCV 93 01/12/2023     01/12/2023    Lab Results   Component Value Date    TROPONINI <0.01 07/16/2022    TSH 3.23 01/07/2023    INR 1.15 01/06/2023        40 minutes spent on the date of encounter doing chart review, review of test results, interpretation with above tests, patient visit, and documentation.        This note has been dictated using voice recognition software. Any grammatical, typographical, or context distortions are unintentional and inherent to the software          Thank you for allowing me to participate in the care of your patient.      Sincerely,     VERONA Salgado [facility]  cc:   Prema Saenz MD  [address]  [address]  [place] [address]

## 2024-10-29 ENCOUNTER — ALLIED HEALTH/NURSE VISIT (OUTPATIENT)
Dept: CARDIOLOGY | Facility: CLINIC | Age: 74
End: 2024-10-29
Payer: COMMERCIAL

## 2024-10-29 VITALS — DIASTOLIC BLOOD PRESSURE: 70 MMHG | SYSTOLIC BLOOD PRESSURE: 125 MMHG

## 2024-10-29 DIAGNOSIS — Z01.30 BP CHECK: Primary | ICD-10-CM

## 2024-10-29 PROCEDURE — 99207 PR NO CHARGE NURSE ONLY: CPT | Performed by: NURSE PRACTITIONER

## 2024-10-29 NOTE — PROGRESS NOTES
Jordan Fisher was evaluated at [facility] on October 29, 2024 at which time his blood pressure was:    BP Readings from Last 1 Encounters:   10/29/24 125/70     No data recorded      Reviewed lifestyle modifications for blood pressure control and reduction: including making healthy food choices, managing weight, getting regular exercise, smoking cessation, reducing alcohol consumption, monitoring blood pressure regularly.     Symptoms: None    BP Goal:< 140/90 mmHg    BP Assessment:  BP at goal    Potential Reasons for BP too high: NA - Not applicable    BP Follow-Up Plan: Recheck BP in 6 months at pharmacy    Recommendation to Provider: BP at goal within pharmacy    Note completed by: Palak Chun RPH

## 2025-03-15 ENCOUNTER — HEALTH MAINTENANCE LETTER (OUTPATIENT)
Age: 75
End: 2025-03-15

## 2025-04-19 PROBLEM — I50.32 HEART FAILURE WITH IMPROVED EJECTION FRACTION (HFIMPEF) (H): Status: ACTIVE | Noted: 2025-04-19

## 2025-04-19 NOTE — PROGRESS NOTES
Assessment/Recommendations   Assessment:      # Ischemic cardiomyopathy, heart failure with improved ejection fraction with LVEF of 54% per ECHO in 11/2024, NYHA Class II:  Previous ECHO in 5/2023 LVEF of 60 to 65% per echo in May 2023   LVEF of 40 to 45% per echo in January 2023.    Current home weight is 185 lbs- a week ago without clothes on.  Clinic weight is up 10+ pounds since October 2025.  Possible combination of increased calorie intake and salt indiscretion in his diet.  Patient's mild hypervolemic on exam. He reports more tired, noted increased leg swelling in both legs with rt>lt.  Dyspnea with over exertion and mild lightheaded with quick position change.  Denies SOB, PND or orthopnea    He underwent MPI stress test negative for inducible myocardial ischemia.  His NT proBNP was found within normal limit.    Current heart failure regimen includes   - Beta-blocker therapy with metoprolol succinate 25 mg daily at bedtime    - ARB therapy with losartan 100 mg daily in a.m.   - Diuretic therapy with hydrochlorothiazide 25 mg every other day. Also on furosemide 20 mg PRN.   Used couple weeks 3 days in a row- seem helped with leg swelling    CMP stable.    # Coronary artery disease with status post STEMI complicated by ventricular fibrillation with an cardiac arrest requiring requiring defibrillation with ROSC/dyslipidemia with LDL goal less than 70: Status post PCI/SARITHA to proximal to mid LAD on 1/6/2023.  Repeat coronary angiogram on 1/10/2023 showed patent stents.    On as needed nitroglycerin and sildenafil.  Patient understands and ware the severe drug interaction between these 2 medications.    Most recent LDL in 8/2024 is at goal    MPI Nuclear stress test in September 2024 negative for inducible myocardial ischemic.  Stress test also showed findings consistent with prior inferior apical myocardial infarction.  He was unable to get appointment with Dr. Saenz and therefore saw cardiology at  [facility].  Note reviewed.    No chest pain.    # Hypertension: BP stable since on hydrochlorothiazide- started couple weeks ago.  Denies adverse effects.    # Mild to moderate aortic valve regurgitation per echo in 11/2024: Increased fatigue, dyspnea with overexertion, weight gain and lower extremity edema.    Plan:  - Given hypervolemic on exam, will resume furosemide 20 mg daily  - Given stable blood pressure, will continue on hydrochlorothiazide 25 mg every other day.  - Recheck BMP in a week  - Instructed to call with adverse effects  - Reinforced low salt diet <2000 mg per day, daily weight monitoring, and maintain adequate fluid intake with 50-60 ounces per day  - Schedule echocardiogram in 3 to 4 weeks or few days prior to follow-up with me to reassess HFpEF and aortic valve    Follow-up with me in 4 to 6 weeks.  Follow-up with Dr. Saenz in 3 to 4 months.     The longitudinal plan of care for heart failure with improved ejection fraction, ischemic cardiomyopathy, coronary disease, and hypertension was addressed during this visit.?Due to the added   complexity in care, I will continue to support Jordan Fisher in the subsequent management of this   condition(s) and with the ongoing continuity of care of this condition(s)       History of Present Illness/Subjective    Mr. Jordan Fisher is a 74 year old male with a past medical history of Ambrose's esophagus, carotid artery disease, hypertension, hyperlipidemia, overweight, and recent hospitalization with STEMI involving anterior wall, V. fib and cardiac arrest requiring defibrillation and ROSC, status post PCI to mid LAD, dyslipidemia, ischemic cardiomyopathy, and heart failure with improved ejection fraction who is seen at [facility] for for continued heart failure follow-up.    During last heart failure clinic visit, patient denied recurrent shortness of breath since he saw cardiology at [facility] and had a  stress test.  His leg swelling resolved after he started taking hydrochlorothiazide regularly.  He gets mild swelling in the legs more than of the day.  He was wearing compression stocking.  He noted overall persistent weight gain in the past 6 months.    Today, Jordan reports persistent weight gain with increased fatigue, lower extremity edema, and shortness of breath with overexertion.  He has been taking hydrochlorothiazide every other day instead of every day for the last couple months.  He adjusted the dose on his own.  He took furosemide for 3 days couple weeks ago but it was hesitant to take more because of the fear of kidney injury.    He denies lightheadedness, shortness of breath, orthopnea, PND, palpitations, chest pain, and abdominal fullness/bloating.  He has been experiencing some tenderness on the old scar of gallbladder surgery.  He does not report any fever or chills.    He walks about 1-1.5 hour on a regular basis.    He states he has been eating a lot of seafood at the restaurant in the past few months.    ECHO from 11/2024-reviewed:  Narrative  Performed by [facility]  Summary    1. Normal left ventricular chamber size; calculated 3-D volumetric left ventricular ejection fraction 54% with normal regional wall motion.    2. Normal right ventricular chamber size with normal right ventricular systolic function.    3. Mild-moderate AR.    4. No pericardial effusion.    5. Dilated proximal ascending aorta with diameter 4.0 cm.    6. Compared to the prior study from 1/6/22, the current study reveals mild increase in the size of the ascending aorta.     NM Mpi with lexiscan from 9/2024- external report  Order: [id number]  Narrative  Summary    1. Exercise stress myocardial perfusion imaging performed.    2. The patient exercised for 8 minute(s) on the Saeed protocol and achieved 88% of the maximally predicted heart rate, corresponding to above-normal functional capacity for age.    3. The patient  experienced dyspnea during the stress test.    4. Resting ECG with baseline ST-T abnormalities. Stress ECG negative for myocardial ischemia relative to baseline.    5. Normal left ventricular chamber size with subtle hypokinesis of the apical inferior segment and normal calculated ejection fraction (62%).    6. Myocardial perfusion imaging is abnormal with a small, moderate-intensity apical inferior perfusion defect on stress supine and prone imaging that improves mildly at rest. Findings are consistent with prior inferior apical myocardial infarction  and a small area of stress-induced paul-infarct ischemia (in either a distal wrap-around left anterior descending coronary artery distribution or posterior descending artery territory). Additional diaphragmatic attenuation is noted, resolving on  prone imaging.    Prior Study Comparison    No prior study for comparison.    ECHO from 5/2023-reviewed:  Interpretation Summary   1.Left ventricular size, wall motion and function are normal. The ejection  fraction is 60-65%.  2.There is mild septal hypokinesis.  3.Normal right ventricle size and systolic function.  4.Thickened aortic valve leaflets There is mild (1+) aortic  regurgitation.Decel 1/2 time is 525 msec.  5.The ascending aorta is Borderline dilated.  Compared to the prior study dated 1/8/2023, the LV has normalized and the WMA  has resolved.    Coronary Angiogram on 1/10/23: reviewed:  Conclusion  Widely patent stent in prox-to-mid LAD.  Mild non obstructive disease in RCA and LCx (unchanged from prior angiogram on 1/6/2023).  No new obstructive lesions noted.    Chest wall pain secondary to CPR during recent cardiac arrest.      Plan   Follow bedrest per protocol   Continued medical management and lifestyle modifications for cardiovascular risk factor optimizations.   Follow up visit with Nurse Practitioner in 1-2 weeks.   Arterial sheath removed from radial artery with TR band placement.   Cardiac  "rehabilitation.   Return to the primary inpatient team for further evaluation and managmenet    1. DAPT x at least 12 months  2. High intensity statin     Cor Angio on 1/6/23Conclusion  Anterior STEMI.  Single vessel severe CAD.  99% thrombotic lesion in prox-to-mid LAD s/p successful PCI with SARITHA x 1 (Synergy 3.0 x 38 mm SARITHA, post dilated with 3.5 mm balloon); mild disease in RCA and LCx.  Successful closure of right CFA access with 6F Angioseal.        Plan   Follow bedrest per protocol   Continued medical management and lifestyle modifications for cardiovascular risk factor optimizations.   Arterial sheath removed from femoral artery with closure device.   Femoral angiogram identifies arterial sheath placement suitable for closure device.   Admit to inpatient    1. DAPT with ASA/Ticagrelor x 12 months, ASA monotherapy indefinitely thereafter.  2. High intensity statin  3. Echocardiogram  4. Beta blocker once off pressors and hemodynamically stable  5. Aggressive risk factor modification and medical management of coronary artery disease.  6. Bed rest x 3 hours with femoral access site monitoring as per protocol.     ECHO 1/8/23-Reviewed:   Interpretation Summary  Limited TTE to evaluate for LV function/wall motion.     Left ventricular function is decreased. The ejection fraction is 40-45%  (mildly reduced). There is a pattern of wall motion abnormalities that is  consistent with a LAD culprit infarction.  Global right ventricular function is normal. This study was compared with the  study from 01/07/2023. No significant changes noted.     Physical Examination Review of Systems   /82 (BP Location: Right arm, Patient Position: Sitting, Cuff Size: Adult Regular)   Pulse 55   Resp 19   Ht 1.702 m (5' 7\")   Wt 86.9 kg (191 lb 9.6 oz)   BMI 30.01 kg/m    Body mass index is 30.01 kg/m .  Wt Readings from Last 3 Encounters:   04/21/25 86.9 kg (191 lb 9.6 oz)   10/22/24 81.2 kg (179 lb)   08/22/23 78.9 kg (174 " lb)     General Appearance:   no distress, normal body habitus   ENT/Mouth: membranes moist, no oral lesions or bleeding gums.      EYES:  no scleral icterus, normal conjunctivae   Neck: no carotid bruits or thyromegaly   Chest/Lungs:   lungs are clear to auscultation, no rales or wheezing, equal chest wall expansion    Cardiovascular:    Heart rate regular; Normal first and second heart sounds with no murmurs, rubs, or gallops;  Jugular venous pressure flat, 1-2 + non pitting edema bilaterally    Abdomen:  Large but soft; no organomegaly, masses, bruits, or tenderness; bowel sounds are present; noted mild tenderness on exam on right upper quadrant old scar of ? cholecystectomy   Extremities   no cyanosis or clubbing;  CMS intact   Skin: no xanthelasma, warm.    Neurologic: no tremors   Psychiatric: alert and oriented x3, calm                                                    Negative unless noted in HPI     Medical History  Surgical History Family History Social History   No past medical history on file. Past Surgical History:   Procedure Laterality Date    CHOLECYSTECTOMY      CV CORONARY ANGIOGRAM N/A 1/6/2023    Procedure: Coronary Angiogram;  Surgeon: Brandon Walker MD;  Location: [facility] CARDIAC CATH LAB    CV CORONARY ANGIOGRAM N/A 1/10/2023    Procedure: Coronary Angiogram;  Surgeon: Brandon Walker MD;  Location: [facility] CARDIAC CATH LAB    CV INTRAVASULAR ULTRASOUND N/A 1/6/2023    Procedure: Intravascular Ultrasound;  Surgeon: Brandon Walker MD;  Location: [facility] CARDIAC CATH LAB    CV PCI STENT DRUG ELUTING N/A 1/6/2023    Procedure: Percutaneous Coronary Intervention Stent;  Surgeon: Brandon Walker MD;  Location: [facility] CARDIAC CATH LAB    No family history on file. Social History     Socioeconomic History    Marital status:      Spouse name: Not on file    Number of children: Not on file    Years of education: Not on file     Highest education level: Not on file   Occupational History    Not on file   Tobacco Use    Smoking status: Never    Smokeless tobacco: Never   Substance and Sexual Activity    Alcohol use: Yes    Drug use: No    Sexual activity: Not on file   Other Topics Concern    Not on file   Social History Narrative    Not on file     Social Drivers of Health     Financial Resource Strain: Low Risk  (8/13/2021)    Received from [facility]    Overall Financial Resource Strain (CARDIA)     Difficulty of Paying Living Expenses: Not very hard   Food Insecurity: No Food Insecurity (8/13/2021)    Received from [facility]    Hunger Vital Sign     Worried About Running Out of Food in the Last Year: Never true     Ran Out of Food in the Last Year: Never true   Transportation Needs: No Transportation Needs (8/13/2021)    Received from [facility]    PRAPARE - Transportation     Lack of Transportation (Medical): No     Lack of Transportation (Non-Medical): No   Physical Activity: Sufficiently Active (8/13/2021)    Received from [facility]    Exercise Vital Sign     Days of Exercise per Week: 5 days     Minutes of Exercise per Session: 70 min   Stress: Stress Concern Present (8/13/2021)    Received from [facility]    [employer] - Occupational Stress Questionnaire     Feeling of Stress : To some extent   Social Connections: Moderately Integrated (8/13/2021)    Received from [facility]    Social Connection and Isolation Panel [NHANES]     Frequency of Communication with Friends and Family: More than three times a week     Frequency of Social Gatherings with Friends and Family: Twice a week     Attends Mandaeism Services: 1 to 4 times per year     Active Member of Clubs or Organizations: No     Attends Club or Organization Meetings: Never     Marital Status:    Interpersonal Safety: Not At Risk (8/13/2021)    Received from [facility]    Humiliation, Afraid, Rape, and Kick questionnaire     Fear of  Current or Ex-Partner: No     Emotionally Abused: No     Physically Abused: No     Sexually Abused: No   Housing Stability: Low Risk  (8/13/2021)    Received from [facility]    Housing Stability Vital Sign     Unable to Pay for Housing in the Last Year: No     In the last 12 months, how many places have you lived?: 1     In the last 12 months, was there a time when you did not have a steady place to sleep or slept in a shelter (including now)?: No          Medications  Allergies   Current Outpatient Medications   Medication Sig Dispense Refill    aspirin 81 mg chewable tablet Take 81 mg by mouth daily      atorvastatin (LIPITOR) 80 MG tablet Take 80 mg by mouth daily.      famotidine (PEPCID) 20 MG tablet Take 20 mg by mouth every evening.      furosemide (LASIX) 40 MG tablet Take 0.5 tablets (20 mg) by mouth daily as needed (take 1 tablet as needed for weight gain >3 lbs with leg swelling, abdominal bloating or shortness of breath) 90 tablet 3    hydrochlorothiazide (HYDRODIURIL) 25 MG tablet Take 1 tablet by mouth daily.      losartan (COZAAR) 100 MG tablet Take 1 tablet by mouth daily      metoprolol succinate ER (TOPROL XL) 25 MG 24 hr tablet Take 1 tablet (25 mg) by mouth At Bedtime (Patient taking differently: Take 25 mg by mouth at bedtime.) 90 tablet 3    Multiple Vitamins-Minerals (PRESERVISION AREDS 2 PO) Take 1 capsule by mouth 2 times daily.      nitroGLYcerin (NITROSTAT) 0.4 MG sublingual tablet For chest pain place 1 tablet under the tongue every 5 minutes for 3 doses. If symptoms persist 5 minutes after 1st dose call 911. 30 tablet 0    Omeprazole 20 MG TBDD Take 20 mg by mouth every morning.      sildenafil (VIAGRA) 25 MG tablet Take 1 tablet (25 mg) by mouth daily as needed 10 tablet 0    sucralfate (CARAFATE) 1 GM tablet Take 1 g by mouth 4 times daily as needed (Heartburn).      tamsulosin (FLOMAX) 0.4 MG capsule Take 0.4 mg by mouth daily      Allergies   Allergen Reactions    Amoxicillin  Unknown     Unsure, long time ago      Niacin Unknown    Shellfish Containing Products [Shellfish-Derived Products] Unknown    Lisinopril Cough         Lab Results    Chemistry/lipid CBC Cardiac Enzymes/BNP/TSH/INR   Lab Results   Component Value Date    CHOL 110 10/03/2023    HDL 47 10/03/2023    TRIG 66 10/03/2023    BUN 27.8 (H) 04/21/2025     04/21/2025    CO2 28 04/21/2025    Lab Results   Component Value Date    WBC 8.4 01/12/2023    HGB 12.2 (L) 01/12/2023    HCT 36.5 (L) 01/12/2023    MCV 93 01/12/2023     01/12/2023    Lab Results   Component Value Date    TROPONINI <0.01 07/16/2022    TSH 3.23 01/07/2023    INR 1.15 01/06/2023        38 minutes spent on the date of encounter doing chart review, review of test results, interpretation with above tests, patient visit, and documentation.        This note has been dictated using voice recognition software. Any grammatical, typographical, or context distortions are unintentional and inherent to the software

## 2025-04-21 ENCOUNTER — TELEPHONE (OUTPATIENT)
Dept: CARDIOLOGY | Facility: CLINIC | Age: 75
End: 2025-04-21

## 2025-04-21 ENCOUNTER — OFFICE VISIT (OUTPATIENT)
Dept: CARDIOLOGY | Facility: CLINIC | Age: 75
End: 2025-04-21
Payer: COMMERCIAL

## 2025-04-21 VITALS
HEIGHT: 67 IN | RESPIRATION RATE: 19 BRPM | WEIGHT: 191.6 LBS | HEART RATE: 55 BPM | DIASTOLIC BLOOD PRESSURE: 82 MMHG | BODY MASS INDEX: 30.07 KG/M2 | SYSTOLIC BLOOD PRESSURE: 132 MMHG

## 2025-04-21 DIAGNOSIS — I35.1 MILD AORTIC REGURGITATION: ICD-10-CM

## 2025-04-21 DIAGNOSIS — I25.10 CORONARY ARTERY DISEASE INVOLVING NATIVE CORONARY ARTERY OF NATIVE HEART WITHOUT ANGINA PECTORIS: ICD-10-CM

## 2025-04-21 DIAGNOSIS — I25.5 ISCHEMIC CARDIOMYOPATHY: ICD-10-CM

## 2025-04-21 DIAGNOSIS — E78.5 DYSLIPIDEMIA, GOAL LDL BELOW 70: ICD-10-CM

## 2025-04-21 DIAGNOSIS — I50.32 HEART FAILURE WITH IMPROVED EJECTION FRACTION (HFIMPEF) (H): Primary | ICD-10-CM

## 2025-04-21 DIAGNOSIS — I10 ESSENTIAL HYPERTENSION: ICD-10-CM

## 2025-04-21 LAB
ALBUMIN SERPL BCG-MCNC: 4 G/DL (ref 3.5–5.2)
ALP SERPL-CCNC: 76 U/L (ref 40–150)
ALT SERPL W P-5'-P-CCNC: 24 U/L (ref 0–70)
ANION GAP SERPL CALCULATED.3IONS-SCNC: 6 MMOL/L (ref 7–15)
AST SERPL W P-5'-P-CCNC: 21 U/L (ref 0–45)
BILIRUB SERPL-MCNC: 0.9 MG/DL
BUN SERPL-MCNC: 27.8 MG/DL (ref 8–23)
CALCIUM SERPL-MCNC: 8.6 MG/DL (ref 8.8–10.4)
CHLORIDE SERPL-SCNC: 108 MMOL/L (ref 98–107)
CREAT SERPL-MCNC: 1.08 MG/DL (ref 0.67–1.17)
EGFRCR SERPLBLD CKD-EPI 2021: 71 ML/MIN/1.73M2
GLUCOSE SERPL-MCNC: 100 MG/DL (ref 70–99)
HCO3 SERPL-SCNC: 28 MMOL/L (ref 22–29)
POTASSIUM SERPL-SCNC: 4.2 MMOL/L (ref 3.4–5.3)
PROT SERPL-MCNC: 7.1 G/DL (ref 6.4–8.3)
SODIUM SERPL-SCNC: 142 MMOL/L (ref 135–145)

## 2025-04-21 PROCEDURE — 80053 COMPREHEN METABOLIC PANEL: CPT | Performed by: NURSE PRACTITIONER

## 2025-04-21 PROCEDURE — G2211 COMPLEX E/M VISIT ADD ON: HCPCS | Performed by: NURSE PRACTITIONER

## 2025-04-21 PROCEDURE — 3079F DIAST BP 80-89 MM HG: CPT | Performed by: NURSE PRACTITIONER

## 2025-04-21 PROCEDURE — 99214 OFFICE O/P EST MOD 30 MIN: CPT | Performed by: NURSE PRACTITIONER

## 2025-04-21 PROCEDURE — 3075F SYST BP GE 130 - 139MM HG: CPT | Performed by: NURSE PRACTITIONER

## 2025-04-21 PROCEDURE — 36415 COLL VENOUS BLD VENIPUNCTURE: CPT | Performed by: NURSE PRACTITIONER

## 2025-04-21 NOTE — TELEPHONE ENCOUNTER
----- Message from Lauri Rico sent at 4/21/2025 11:06 AM CDT -----  Stable lab.  Resume furosemide 20 mg daily.  Continue on hydrochlorothiazide 12.5 mg every other day for now.  Call us if develop lightheaded, dizziness, excessive thirst or weakness.  Recheck BMP in 1 week.  Thank you!  CY

## 2025-04-21 NOTE — PATIENT INSTRUCTIONS
Jordan Fisher,    It was a pleasure to see you today at the [facility].     My recommendations after this visit include:    - No medications changes made today    - We will follow up with your lab result     - Schedule ECHO in 3-4 weeks or before follow up with Lauri     - Low sodium diet < 2000 mg/day, daily weight monitoring, and maintain adequate fluid intake at 50 to 60 ounces per day    -Please call if you experience persistent weight gain 2 to 3 pounds 2 days in a row or 5 pounds in a week with shortness of breath, abdominal bloating and leg swelling    - Follow up with Lauri Rico CNP in 4-6 weeks     - Follow up with Dr. Saenz in 3-4  months    - Please call Heart Failure Nurse Line at [phone], if you have any questions or concerns

## 2025-04-21 NOTE — LETTER
4/21/2025    Wen Mederos MD  [facility] [place] [address]  [facility] [address]    RE: Jordan Fisher       Dear Colleague,     I had the pleasure of seeing Jordan Fisher in the [facility].          Assessment/Recommendations   Assessment:      # Ischemic cardiomyopathy, heart failure with improved ejection fraction with LVEF of 54% per ECHO in 11/2024, NYHA Class II:  Previous ECHO in 5/2023 LVEF of 60 to 65% per echo in May 2023   LVEF of 40 to 45% per echo in January 2023.    Current home weight is 185 lbs- a week ago without clothes on.  Clinic weight is up 10+ pounds since October 2025.  Possible combination of increased calorie intake and salt indiscretion in his diet.  Patient's mild hypervolemic on exam. He reports more tired, noted increased leg swelling in both legs with rt>lt.  Dyspnea with over exertion and mild lightheaded with quick position change.  Denies SOB, PND or orthopnea    He underwent MPI stress test negative for inducible myocardial ischemia.  His NT proBNP was found within normal limit.    Current heart failure regimen includes   - Beta-blocker therapy with metoprolol succinate 25 mg daily at bedtime    - ARB therapy with losartan 100 mg daily in a.m.   - Diuretic therapy with hydrochlorothiazide 25 mg every other day. Also on furosemide 20 mg PRN.   Used couple weeks 3 days in a row- seem helped with leg swelling    CMP stable.    # Coronary artery disease with status post STEMI complicated by ventricular fibrillation with an cardiac arrest requiring requiring defibrillation with ROSC/dyslipidemia with LDL goal less than 70: Status post PCI/SARITHA to proximal to mid LAD on 1/6/2023.  Repeat coronary angiogram on 1/10/2023 showed patent stents.    On as needed nitroglycerin and sildenafil.  Patient understands and ware the severe drug interaction between these 2 medications.    Most recent LDL in 8/2024 is at goal    MPI Nuclear stress test in  September 2024 negative for inducible myocardial ischemic.  Stress test also showed findings consistent with prior inferior apical myocardial infarction.  He was unable to get appointment with Dr. Saenz and therefore saw cardiology at [facility].  Note reviewed.    No chest pain.    # Hypertension: BP stable since on hydrochlorothiazide- started couple weeks ago.  Denies adverse effects.    # Mild to moderate aortic valve regurgitation per echo in 11/2024: Increased fatigue, dyspnea with overexertion, weight gain and lower extremity edema.    Plan:  - Given hypervolemic on exam, will resume furosemide 20 mg daily  - Given stable blood pressure, will continue on hydrochlorothiazide 25 mg every other day.  - Recheck BMP in a week  - Instructed to call with adverse effects  - Reinforced low salt diet <2000 mg per day, daily weight monitoring, and maintain adequate fluid intake with 50-60 ounces per day  - Schedule echocardiogram in 3 to 4 weeks or few days prior to follow-up with me to reassess HFpEF and aortic valve    Follow-up with me in 4 to 6 weeks.  Follow-up with Dr. Saenz in 3 to 4 months.     The longitudinal plan of care for heart failure with improved ejection fraction, ischemic cardiomyopathy, coronary disease, and hypertension was addressed during this visit.?Due to the added   complexity in care, I will continue to support Jordan Fisher in the subsequent management of this   condition(s) and with the ongoing continuity of care of this condition(s)       History of Present Illness/Subjective    Mr. Jordan Fisher is a 74 year old male with a past medical history of Ambrose's esophagus, carotid artery disease, hypertension, hyperlipidemia, overweight, and recent hospitalization with STEMI involving anterior wall, V. fib and cardiac arrest requiring defibrillation and ROSC, status post PCI to mid LAD, dyslipidemia, ischemic cardiomyopathy, and heart failure with improved ejection fraction who is seen  at [facility] for for continued heart failure follow-up.    During last heart failure clinic visit, patient denied recurrent shortness of breath since he saw cardiology at [facility] and had a stress test.  His leg swelling resolved after he started taking hydrochlorothiazide regularly.  He gets mild swelling in the legs more than of the day.  He was wearing compression stocking.  He noted overall persistent weight gain in the past 6 months.    Today, Jordan reports persistent weight gain with increased fatigue, lower extremity edema, and shortness of breath with overexertion.  He has been taking hydrochlorothiazide every other day instead of every day for the last couple months.  He adjusted the dose on his own.  He took furosemide for 3 days couple weeks ago but it was hesitant to take more because of the fear of kidney injury.    He denies lightheadedness, shortness of breath, orthopnea, PND, palpitations, chest pain, and abdominal fullness/bloating.  He has been experiencing some tenderness on the old scar of gallbladder surgery.  He does not report any fever or chills.    He walks about 1-1.5 hour on a regular basis.    He states he has been eating a lot of seafood at the restaurant in the past few months.    ECHO from 11/2024-reviewed:  Narrative  Performed by [facility]  Summary    1. Normal left ventricular chamber size; calculated 3-D volumetric left ventricular ejection fraction 54% with normal regional wall motion.    2. Normal right ventricular chamber size with normal right ventricular systolic function.    3. Mild-moderate AR.    4. No pericardial effusion.    5. Dilated proximal ascending aorta with diameter 4.0 cm.    6. Compared to the prior study from 1/6/22, the current study reveals mild increase in the size of the ascending aorta.     NM Mpi with lexiscan from 9/2024- external report  Order: [id number]  Narrative  Summary    1. Exercise stress myocardial  perfusion imaging performed.    2. The patient exercised for 8 minute(s) on the Saeed protocol and achieved 88% of the maximally predicted heart rate, corresponding to above-normal functional capacity for age.    3. The patient experienced dyspnea during the stress test.    4. Resting ECG with baseline ST-T abnormalities. Stress ECG negative for myocardial ischemia relative to baseline.    5. Normal left ventricular chamber size with subtle hypokinesis of the apical inferior segment and normal calculated ejection fraction (62%).    6. Myocardial perfusion imaging is abnormal with a small, moderate-intensity apical inferior perfusion defect on stress supine and prone imaging that improves mildly at rest. Findings are consistent with prior inferior apical myocardial infarction  and a small area of stress-induced paul-infarct ischemia (in either a distal wrap-around left anterior descending coronary artery distribution or posterior descending artery territory). Additional diaphragmatic attenuation is noted, resolving on  prone imaging.    Prior Study Comparison    No prior study for comparison.    ECHO from 5/2023-reviewed:  Interpretation Summary   1.Left ventricular size, wall motion and function are normal. The ejection  fraction is 60-65%.  2.There is mild septal hypokinesis.  3.Normal right ventricle size and systolic function.  4.Thickened aortic valve leaflets There is mild (1+) aortic  regurgitation.Decel 1/2 time is 525 msec.  5.The ascending aorta is Borderline dilated.  Compared to the prior study dated 1/8/2023, the LV has normalized and the WMA  has resolved.    Coronary Angiogram on 1/10/23: reviewed:  Conclusion  Widely patent stent in prox-to-mid LAD.  Mild non obstructive disease in RCA and LCx (unchanged from prior angiogram on 1/6/2023).  No new obstructive lesions noted.    Chest wall pain secondary to CPR during recent cardiac arrest.      Plan    Follow bedrest per protocol    Continued medical  management and lifestyle modifications for cardiovascular risk factor optimizations.    Follow up visit with Nurse Practitioner in 1-2 weeks.    Arterial sheath removed from radial artery with TR band placement.    Cardiac rehabilitation.    Return to the primary inpatient team for further evaluation and managmenet    1. DAPT x at least 12 months  2. High intensity statin     Cor Angio on 1/6/23Conclusion  Anterior STEMI.  Single vessel severe CAD.  99% thrombotic lesion in prox-to-mid LAD s/p successful PCI with SARITHA x 1 (Synergy 3.0 x 38 mm SARITHA, post dilated with 3.5 mm balloon); mild disease in RCA and LCx.  Successful closure of right CFA access with 6F Angioseal.        Plan    Follow bedrest per protocol    Continued medical management and lifestyle modifications for cardiovascular risk factor optimizations.    Arterial sheath removed from femoral artery with closure device.    Femoral angiogram identifies arterial sheath placement suitable for closure device.    Admit to inpatient    1. DAPT with ASA/Ticagrelor x 12 months, ASA monotherapy indefinitely thereafter.  2. High intensity statin  3. Echocardiogram  4. Beta blocker once off pressors and hemodynamically stable  5. Aggressive risk factor modification and medical management of coronary artery disease.  6. Bed rest x 3 hours with femoral access site monitoring as per protocol.     ECHO 1/8/23-Reviewed:   Interpretation Summary  Limited TTE to evaluate for LV function/wall motion.     Left ventricular function is decreased. The ejection fraction is 40-45%  (mildly reduced). There is a pattern of wall motion abnormalities that is  consistent with a LAD culprit infarction.  Global right ventricular function is normal. This study was compared with the  study from 01/07/2023. No significant changes noted.     Physical Examination Review of Systems   /82 (BP Location: Right arm, Patient Position: Sitting, Cuff Size: Adult Regular)   Pulse 55   Resp 19   " Ht 1.702 m (5' 7\")   Wt 86.9 kg (191 lb 9.6 oz)   BMI 30.01 kg/m    Body mass index is 30.01 kg/m .  Wt Readings from Last 3 Encounters:   04/21/25 86.9 kg (191 lb 9.6 oz)   10/22/24 81.2 kg (179 lb)   08/22/23 78.9 kg (174 lb)     General Appearance:   no distress, normal body habitus   ENT/Mouth: membranes moist, no oral lesions or bleeding gums.      EYES:  no scleral icterus, normal conjunctivae   Neck: no carotid bruits or thyromegaly   Chest/Lungs:   lungs are clear to auscultation, no rales or wheezing, equal chest wall expansion    Cardiovascular:    Heart rate regular; Normal first and second heart sounds with no murmurs, rubs, or gallops;  Jugular venous pressure flat, 1-2 + non pitting edema bilaterally    Abdomen:  Large but soft; no organomegaly, masses, bruits, or tenderness; bowel sounds are present; noted mild tenderness on exam on right upper quadrant old scar of ? cholecystectomy   Extremities   no cyanosis or clubbing;  CMS intact   Skin: no xanthelasma, warm.    Neurologic: no tremors   Psychiatric: alert and oriented x3, calm                                                    Negative unless noted in HPI     Medical History  Surgical History Family History Social History   No past medical history on file. Past Surgical History:   Procedure Laterality Date     CHOLECYSTECTOMY       CV CORONARY ANGIOGRAM N/A 1/6/2023    Procedure: Coronary Angiogram;  Surgeon: Brandon Walker MD;  Location: [facility] CARDIAC CATH LAB     CV CORONARY ANGIOGRAM N/A 1/10/2023    Procedure: Coronary Angiogram;  Surgeon: Brandon Walker MD;  Location: [facility] CARDIAC CATH LAB     CV INTRAVASULAR ULTRASOUND N/A 1/6/2023    Procedure: Intravascular Ultrasound;  Surgeon: Brandon Walker MD;  Location: [facility] CARDIAC CATH LAB     CV PCI STENT DRUG ELUTING N/A 1/6/2023    Procedure: Percutaneous Coronary Intervention Stent;  Surgeon: Brandon Walker MD;  " Location:  HEART CARDIAC CATH LAB    No family history on file. Social History     Socioeconomic History     Marital status:      Spouse name: Not on file     Number of children: Not on file     Years of education: Not on file     Highest education level: Not on file   Occupational History     Not on file   Tobacco Use     Smoking status: Never     Smokeless tobacco: Never   Substance and Sexual Activity     Alcohol use: Yes     Drug use: No     Sexual activity: Not on file   Other Topics Concern     Not on file   Social History Narrative     Not on file     Social Drivers of Health     Financial Resource Strain: Low Risk  (8/13/2021)    [facility]    Overall Financial Resource Strain (CARDIA)      Difficulty of Paying Living Expenses: Not very hard   Food Insecurity: No Food Insecurity (8/13/2021)    Received from [facility]    Hunger Vital Sign      Worried About Running Out of Food in the Last Year: Never true      Ran Out of Food in the Last Year: Never true   Transportation Needs: No Transportation Needs (8/13/2021)    Received from [facility]    PRAPARE - Transportation      Lack of Transportation (Medical): No      Lack of Transportation (Non-Medical): No   Physical Activity: Sufficiently Active (8/13/2021)    Received from [facility]    Exercise Vital Sign      Days of Exercise per Week: 5 days      Minutes of Exercise per Session: 70 min   Stress: Stress Concern Present (8/13/2021)    Received from [facility]    [employer] [employer] - Occupational Stress Questionnaire      Feeling of Stress : To some extent   Social Connections: Moderately Integrated (8/13/2021)    Received from [facility]    Social Connection and Isolation Panel [NHANES]      Frequency of Communication with Friends and Family: More than three times a week      Frequency of Social Gatherings with Friends and Family: Twice a week      Attends Anabaptism Services: 1 to 4 times per year       Active Member of Clubs or Organizations: No      Attends Club or Organization Meetings: Never      Marital Status:    Interpersonal Safety: Not At Risk (8/13/2021)    Received from [facility]    Humiliation, Afraid, Rape, and Kick questionnaire      Fear of Current or Ex-Partner: No      Emotionally Abused: No      Physically Abused: No      Sexually Abused: No   Housing Stability: Low Risk  (8/13/2021)    Received from [facility]    Housing Stability Vital Sign      Unable to Pay for Housing in the Last Year: No      In the last 12 months, how many places have you lived?: 1      In the last 12 months, was there a time when you did not have a steady place to sleep or slept in a shelter (including now)?: No          Medications  Allergies   Current Outpatient Medications   Medication Sig Dispense Refill     aspirin 81 mg chewable tablet Take 81 mg by mouth daily       atorvastatin (LIPITOR) 80 MG tablet Take 80 mg by mouth daily.       famotidine (PEPCID) 20 MG tablet Take 20 mg by mouth every evening.       furosemide (LASIX) 40 MG tablet Take 0.5 tablets (20 mg) by mouth daily as needed (take 1 tablet as needed for weight gain >3 lbs with leg swelling, abdominal bloating or shortness of breath) 90 tablet 3     hydrochlorothiazide (HYDRODIURIL) 25 MG tablet Take 1 tablet by mouth daily.       losartan (COZAAR) 100 MG tablet Take 1 tablet by mouth daily       metoprolol succinate ER (TOPROL XL) 25 MG 24 hr tablet Take 1 tablet (25 mg) by mouth At Bedtime (Patient taking differently: Take 25 mg by mouth at bedtime.) 90 tablet 3     Multiple Vitamins-Minerals (PRESERVISION AREDS 2 PO) Take 1 capsule by mouth 2 times daily.       nitroGLYcerin (NITROSTAT) 0.4 MG sublingual tablet For chest pain place 1 tablet under the tongue every 5 minutes for 3 doses. If symptoms persist 5 minutes after 1st dose call 911. 30 tablet 0     Omeprazole 20 MG TBDD Take 20 mg by mouth every morning.       sildenafil (VIAGRA) 25 MG  tablet Take 1 tablet (25 mg) by mouth daily as needed 10 tablet 0     sucralfate (CARAFATE) 1 GM tablet Take 1 g by mouth 4 times daily as needed (Heartburn).       tamsulosin (FLOMAX) 0.4 MG capsule Take 0.4 mg by mouth daily      Allergies   Allergen Reactions     Amoxicillin Unknown     Unsure, long time ago       Niacin Unknown     Shellfish Containing Products [Shellfish-Derived Products] Unknown     Lisinopril Cough         Lab Results    Chemistry/lipid CBC Cardiac Enzymes/BNP/TSH/INR   Lab Results   Component Value Date    CHOL 110 10/03/2023    HDL 47 10/03/2023    TRIG 66 10/03/2023    BUN 27.8 (H) 04/21/2025     04/21/2025    CO2 28 04/21/2025    Lab Results   Component Value Date    WBC 8.4 01/12/2023    HGB 12.2 (L) 01/12/2023    HCT 36.5 (L) 01/12/2023    MCV 93 01/12/2023     01/12/2023    Lab Results   Component Value Date    TROPONINI <0.01 07/16/2022    TSH 3.23 01/07/2023    INR 1.15 01/06/2023        38 minutes spent on the date of encounter doing chart review, review of test results, interpretation with above tests, patient visit, and documentation.        This note has been dictated using voice recognition software. Any grammatical, typographical, or context distortions are unintentional and inherent to the software          Thank you for allowing me to participate in the care of your patient.      Sincerely,     VERONA Salgado CNP     [facility]  cc:   VERONA Salgado CNP  [address]  [place] [address]

## 2025-04-21 NOTE — TELEPHONE ENCOUNTER
Updated pt on results and recommendations to resume Furosemide 20 mg daily, continue taking hydrochlorothiazide 12.5 mg every other day for now and to repeat BMP in 1 week @ [facility] per pt request (orders placed), advised to call [facility] if new or worsening HF sx develop or lightheadedness, dizziness, excessive thirst, weakness or any other side effects, pt is agreeable to this plan.    Lizeth Bennett RN

## 2025-04-28 ENCOUNTER — LAB (OUTPATIENT)
Dept: LAB | Facility: HOSPITAL | Age: 75
End: 2025-04-28
Payer: COMMERCIAL

## 2025-04-28 ENCOUNTER — TELEPHONE (OUTPATIENT)
Dept: CARDIOLOGY | Facility: CLINIC | Age: 75
End: 2025-04-28

## 2025-04-28 DIAGNOSIS — I50.32 HEART FAILURE WITH IMPROVED EJECTION FRACTION (HFIMPEF) (H): ICD-10-CM

## 2025-04-28 DIAGNOSIS — I50.32 HEART FAILURE WITH IMPROVED EJECTION FRACTION (HFIMPEF) (H): Primary | ICD-10-CM

## 2025-04-28 LAB
ANION GAP SERPL CALCULATED.3IONS-SCNC: 4 MMOL/L (ref 7–15)
BUN SERPL-MCNC: 35.8 MG/DL (ref 8–23)
CALCIUM SERPL-MCNC: 8.8 MG/DL (ref 8.8–10.4)
CHLORIDE SERPL-SCNC: 109 MMOL/L (ref 98–107)
CREAT SERPL-MCNC: 1.06 MG/DL (ref 0.67–1.17)
EGFRCR SERPLBLD CKD-EPI 2021: 73 ML/MIN/1.73M2
GLUCOSE SERPL-MCNC: 107 MG/DL (ref 70–99)
HCO3 SERPL-SCNC: 31 MMOL/L (ref 22–29)
POTASSIUM SERPL-SCNC: 4.1 MMOL/L (ref 3.4–5.3)
SODIUM SERPL-SCNC: 144 MMOL/L (ref 135–145)

## 2025-04-28 PROCEDURE — 82310 ASSAY OF CALCIUM: CPT

## 2025-04-28 PROCEDURE — 36415 COLL VENOUS BLD VENIPUNCTURE: CPT

## 2025-04-28 NOTE — TELEPHONE ENCOUNTER
Patient is advised of stable labs, encouraged hydration of 50-60 oz per day, and to have bmp lab in one month. To call if any worsening of symptoms or wt gain occur.  Valerie VICK RN BSN, CHFN, PCCN-K

## 2025-04-28 NOTE — TELEPHONE ENCOUNTER
"----- Message from Lauri Rico sent at 4/28/2025 12:25 PM CDT -----  Continue current diuretic regimen.  Encourage hydration.  Call us if persistent wt gain with worsening HF symptoms.  Recheck BMP in 1 month.  Thank you Valerie!  CY  ----- Message -----  From: Valerie Shen RN  Sent: 4/28/2025  12:21 PM CDT  To: VERONA Salgado CNP    Patient returning call reporting his morning wt today was 186#, does not have b/p readings readily available. Advised labs stable, \"I feel good, no swelling noted and my breathing is better\". Valerie VICK RN BSN, CHFN, PCCN-K  "

## 2025-06-05 ENCOUNTER — HOSPITAL ENCOUNTER (OUTPATIENT)
Dept: CARDIOLOGY | Facility: HOSPITAL | Age: 75
End: 2025-06-05
Attending: NURSE PRACTITIONER
Payer: COMMERCIAL

## 2025-06-05 DIAGNOSIS — I50.32 HEART FAILURE WITH IMPROVED EJECTION FRACTION (HFIMPEF) (H): ICD-10-CM

## 2025-06-05 DIAGNOSIS — I25.5 ISCHEMIC CARDIOMYOPATHY: ICD-10-CM

## 2025-06-05 LAB — LVEF ECHO: NORMAL

## 2025-06-05 PROCEDURE — 255N000002 HC RX 255 OP 636: Performed by: NURSE PRACTITIONER

## 2025-06-05 RX ADMIN — PERFLUTREN 2.5 ML: 6.52 INJECTION, SUSPENSION INTRAVENOUS at 08:48

## 2025-06-06 ENCOUNTER — RESULTS FOLLOW-UP (OUTPATIENT)
Dept: CARDIOLOGY | Facility: CLINIC | Age: 75
End: 2025-06-06

## 2025-06-11 NOTE — PROGRESS NOTES
Assessment/Recommendations   Assessment:      #  Ischemic cardiomyopathy, Heart Failure with Improved/Preserved Ejection Fraction with LVEF of 55-60% per ECHO in 5/2025 -NYHA class II:     Current home weight 179 lbs.    Patient is well compensated on exam except continue to have some FELIPE, increase fatigue, decreased activity tolerance in the past 1 year.    Reports mild lightheaded and dizziness.    On low salt diet < 2g/day.  Tries to drink adequate fluid.    Current heart failure regimen includes   - beta-blocker therapy with metoprolol succinate 25 mg daily at bedtime   - ARB therapy with losartan 100 mg daily in a.m.   - Diuretic therapy with furosemide 20 mg daily   - Hydrochlorothiazide 25 mg daily    # Coronary artery disease with status post STEMI complicated by ventricular fibrillation with an cardiac arrest requiring requiring defibrillation with ROSC/dyslipidemia with LDL goal less than 70: Status post PCI/SARITHA to proximal to mid LAD on 1/6/2023.  Repeat coronary angiogram on 1/10/2023 showed patent stents.      MPI Nuclear stress test negative for inducible myocardial ischemic.  Stress test also showed findings consistent with prior inferior apical myocardial infarction.  He was unable to get appointment with Dr. Saenz and therefore saw cardiology at [facility].  Note reviewed.      Most recent LDL is at goal    # Hypertension: BP today is 110/70    # Valvular Heart Disease: mild to moderate aortic stenosis and mild to moderate mitral regurgitation.   With his ongoing symptoms, patient preferred to be evaluated by valve clinic.  Echo reviewed by Dr. Manning and recommended to repeat ECHO in 3-6 months.    Plan:  - Reduced hydrochlorothiazide from 25 mg to 12.5 mg daily  - Reduce Potassium Chloride from 10 mEq from twice a day to once a day  - Start spironolactone 12.5 mg daily  - Please call if you develop lightheaded, dizziness, or any new side effects  - Please get your lab work  (BMP-kidney function test) in 1 week after medication change    Follow-up with me in 4 weeks.  Follow up with Dr. Saenz in 2-3 months     The longitudinal plan of care for heart failure with improved ejection fraction, ischemic cardiomyopathy, coronary disease, and hypertension was addressed during this visit.?Due to the added   complexity in care, I will continue to support Jordan Fisher in the subsequent management of this   condition(s) and with the ongoing continuity of care of this condition(s)       History of Present Illness/Subjective    Mr. Jordan Fisher is a 74 year old male with a past medical history of Ambrose's esophagus, carotid artery disease, hypertension, hyperlipidemia, overweight, and recent hospitalization with STEMI involving anterior wall, V. fib and cardiac arrest requiring defibrillation and ROSC, status post PCI to mid LAD,, dyslipidemia, ischemic cardiomyopathy, and heart failure with improved ejection fraction who is seen at [facility] for for continued heart failure follow-up.    Today, Jordan reports that he continues to have some FELIPE, increase fatigue, decreased activity tolerance in the past 1 year. He also reports mild lightheaded and dizziness.He denies shortness of breath, orthopnea, PND, palpitations, chest pain, abdominal fullness/bloating, and lower extremity edema.      ECHO from 6/5/25-reviewed:  Interpretation Summary  The left ventricle is normal in size.  The visual ejection fraction is 55-60%.  Hypokinesis of the apical portion of the inferior wall  The right ventricle is not well visualized.  TAPSE is normal, which is consistent with normal right ventricular systolic  function.  The left atrium is moderate to severely dilated.  There is mild to moderate (1-2+) mitral regurgitation.  The right ventricular systolic pressure is approximated at 27mmHg plus the  right atrial pressure.  A bicuspid aortic valve cannot be excluded.  There  is mild to moderate (1-2+) aortic regurgitation.  Mild valvular aortic stenosis.  Ascending Aorta dilatation is present.  Ascending aorta measures 4.1 cm.  Sinus rhythm was noted.  Prior echo from 5/1/23 shows trileaflet AV.  MR and AI are slightly more prominent on current echo. WMA abnormality similar  on prior echo.    NM Mpi with lexiscan from 9/2024- external report  Order: [id number]  Narrative  Summary    1. Exercise stress myocardial perfusion imaging performed.    2. The patient exercised for 8 minute(s) on the Saeed protocol and achieved 88% of the maximally predicted heart rate, corresponding to above-normal functional capacity for age.    3. The patient experienced dyspnea during the stress test.    4. Resting ECG with baseline ST-T abnormalities. Stress ECG negative for myocardial ischemia relative to baseline.    5. Normal left ventricular chamber size with subtle hypokinesis of the apical inferior segment and normal calculated ejection fraction (62%).    6. Myocardial perfusion imaging is abnormal with a small, moderate-intensity apical inferior perfusion defect on stress supine and prone imaging that improves mildly at rest. Findings are consistent with prior inferior apical myocardial infarction  and a small area of stress-induced paul-infarct ischemia (in either a distal wrap-around left anterior descending coronary artery distribution or posterior descending artery territory). Additional diaphragmatic attenuation is noted, resolving on  prone imaging.    Prior Study Comparison    No prior study for comparison.    ECHO from 5/2023-reviewed:  Interpretation Summary   1.Left ventricular size, wall motion and function are normal. The ejection  fraction is 60-65%.  2.There is mild septal hypokinesis.  3.Normal right ventricle size and systolic function.  4.Thickened aortic valve leaflets There is mild (1+) aortic  regurgitation.Decel 1/2 time is 525 msec.  5.The ascending aorta is Borderline  dilated.  Compared to the prior study dated 1/8/2023, the LV has normalized and the WMA  has resolved.    Coronary Angiogram on 1/10/23: reviewed:  Conclusion  Widely patent stent in prox-to-mid LAD.  Mild non obstructive disease in RCA and LCx (unchanged from prior angiogram on 1/6/2023).  No new obstructive lesions noted.    Chest wall pain secondary to CPR during recent cardiac arrest.      Plan   Follow bedrest per protocol   Continued medical management and lifestyle modifications for cardiovascular risk factor optimizations.   Follow up visit with Nurse Practitioner in 1-2 weeks.   Arterial sheath removed from radial artery with TR band placement.   Cardiac rehabilitation.   Return to the primary inpatient team for further evaluation and managmenet    1. DAPT x at least 12 months  2. High intensity statin     Cor Angio on 1/6/23Conclusion  Anterior STEMI.  Single vessel severe CAD.  99% thrombotic lesion in prox-to-mid LAD s/p successful PCI with SARITHA x 1 (Synergy 3.0 x 38 mm SARITHA, post dilated with 3.5 mm balloon); mild disease in RCA and LCx.  Successful closure of right CFA access with 6F Angioseal.        Plan   Follow bedrest per protocol   Continued medical management and lifestyle modifications for cardiovascular risk factor optimizations.   Arterial sheath removed from femoral artery with closure device.   Femoral angiogram identifies arterial sheath placement suitable for closure device.   Admit to inpatient    1. DAPT with ASA/Ticagrelor x 12 months, ASA monotherapy indefinitely thereafter.  2. High intensity statin  3. Echocardiogram  4. Beta blocker once off pressors and hemodynamically stable  5. Aggressive risk factor modification and medical management of coronary artery disease.  6. Bed rest x 3 hours with femoral access site monitoring as per protocol.     ECHO 1/8/23-Reviewed:   Interpretation Summary  Limited TTE to evaluate for LV function/wall motion.     Left ventricular function is  decreased. The ejection fraction is 40-45%  (mildly reduced). There is a pattern of wall motion abnormalities that is  consistent with a LAD culprit infarction.  Global right ventricular function is normal. This study was compared with the  study from 01/07/2023. No significant changes noted.     Physical Examination Review of Systems   /70 (BP Location: Right arm, Patient Position: Sitting, Cuff Size: Adult Regular)   Pulse 60   Resp 16   Wt 83.5 kg (184 lb)   SpO2 96%   BMI 28.82 kg/m    Body mass index is 28.82 kg/m .  Wt Readings from Last 3 Encounters:   06/12/25 83.5 kg (184 lb)   04/21/25 86.9 kg (191 lb 9.6 oz)   10/22/24 81.2 kg (179 lb)     General Appearance:   no distress, normal body habitus   ENT/Mouth: membranes moist, no oral lesions or bleeding gums.      EYES:  no scleral icterus, normal conjunctivae   Neck: no carotid bruits or thyromegaly   Chest/Lungs:   lungs are clear to auscultation, no rales or wheezing, equal chest wall expansion    Cardiovascular:    Heart rate regular; Normal first and second heart sounds with no murmurs, rubs, or gallops;  Jugular venous pressure flat, no edema bilaterally    Abdomen:  no organomegaly, masses, bruits, or tenderness; bowel sounds are present   Extremities   no cyanosis or clubbing;  CMS intact.   Skin: no xanthelasma, warm.    Neurologic: no tremors   Psychiatric: alert and oriented x3, calm                                                    Negative unless noted in HPI     Medical History  Surgical History Family History Social History   No past medical history on file. Past Surgical History:   Procedure Laterality Date    CHOLECYSTECTOMY      CV CORONARY ANGIOGRAM N/A 1/6/2023    Procedure: Coronary Angiogram;  Surgeon: Brandon Walker MD;  Location: [facility] CARDIAC CATH LAB    CV CORONARY ANGIOGRAM N/A 1/10/2023    Procedure: Coronary Angiogram;  Surgeon: Brandon Walker MD;  Location: [facility] CARDIAC CATH LAB     CV INTRAVASULAR ULTRASOUND N/A 1/6/2023    Procedure: Intravascular Ultrasound;  Surgeon: Brandon Walker MD;  Location:  HEART CARDIAC CATH LAB    CV PCI STENT DRUG ELUTING N/A 1/6/2023    Procedure: Percutaneous Coronary Intervention Stent;  Surgeon: Brandon Walker MD;  Location:  HEART CARDIAC CATH LAB    No family history on file. Social History     Socioeconomic History    Marital status:      Spouse name: Not on file    Number of children: Not on file    Years of education: Not on file    Highest education level: Not on file   Occupational History    Not on file   Tobacco Use    Smoking status: Never    Smokeless tobacco: Never   Substance and Sexual Activity    Alcohol use: Yes    Drug use: No    Sexual activity: Not on file   Other Topics Concern    Not on file   Social History Narrative    Not on file     Social Drivers of Health     Financial Resource Strain: Low Risk  (8/13/2021)    Received from [facility]    Overall Financial Resource Strain (CARDIA)     Difficulty of Paying Living Expenses: Not very hard   Food Insecurity: No Food Insecurity (8/13/2021)    Received from [facility]    Hunger Vital Sign     Worried About Running Out of Food in the Last Year: Never true     Ran Out of Food in the Last Year: Never true   Transportation Needs: No Transportation Needs (8/13/2021)    Received from [facility]    PRAPARE - Transportation     Lack of Transportation (Medical): No     Lack of Transportation (Non-Medical): No   Physical Activity: Sufficiently Active (8/13/2021)    Received from [facility]    Exercise Vital Sign     Days of Exercise per Week: 5 days     Minutes of Exercise per Session: 70 min   Stress: Stress Concern Present (8/13/2021)    Received from [facility]    [employer] [place] [employer] - Occupational Stress Questionnaire     Feeling of Stress : To some extent   Social Connections: Moderately Integrated (8/13/2021)    Received  from [facility]    Social Connection and Isolation Panel [NHANES]     Frequency of Communication with Friends and Family: More than three times a week     Frequency of Social Gatherings with Friends and Family: Twice a week     Attends Orthodoxy Services: 1 to 4 times per year     Active Member of Clubs or Organizations: No     Attends Club or Organization Meetings: Never     Marital Status:    Interpersonal Safety: Not At Risk (8/13/2021)    Received from [facility]    Humiliation, Afraid, Rape, and Kick questionnaire     Fear of Current or Ex-Partner: No     Emotionally Abused: No     Physically Abused: No     Sexually Abused: No   Housing Stability: Low Risk  (8/13/2021)    Received from [facility]    Housing Stability Vital Sign     Unable to Pay for Housing in the Last Year: No     In the last 12 months, how many places have you lived?: 1     In the last 12 months, was there a time when you did not have a steady place to sleep or slept in a shelter (including now)?: No          Medications  Allergies   Current Outpatient Medications   Medication Sig Dispense Refill    aspirin 81 mg chewable tablet Take 81 mg by mouth daily      atorvastatin (LIPITOR) 80 MG tablet Take 80 mg by mouth daily.      famotidine (PEPCID) 20 MG tablet Take 20 mg by mouth every evening.      furosemide (LASIX) 20 MG tablet Take 1 tablet daily. May take extra 1 tablet as needed for weight gain >3lbs with shortness of breath, bloating, and leg swelling 130 tablet 1    hydrochlorothiazide (HYDRODIURIL) 25 MG tablet Take 12.5 mg by mouth daily.      losartan (COZAAR) 100 MG tablet Take 1 tablet by mouth daily      metoprolol succinate ER (TOPROL XL) 25 MG 24 hr tablet Take 1 tablet (25 mg) by mouth At Bedtime 90 tablet 3    Multiple Vitamins-Minerals (PRESERVISION AREDS PO) Take 1 capsule by mouth 2 times daily.      Omeprazole 20 MG TBDD Take 20 mg by mouth every morning.      potassium chloride ER (MICRO-K) 10 MEQ CR capsule  Take 10 mEq by mouth daily.      sildenafil (VIAGRA) 25 MG tablet Take 1 tablet (25 mg) by mouth daily as needed 10 tablet 0    spironolactone (ALDACTONE) 25 MG tablet Take 0.5 tablets (12.5 mg) by mouth daily. 15 tablet 1    sucralfate (CARAFATE) 1 GM tablet Take 1 g by mouth 4 times daily as needed (Heartburn).      tamsulosin (FLOMAX) 0.4 MG capsule Take 0.4 mg by mouth daily      nitroGLYcerin (NITROSTAT) 0.4 MG sublingual tablet For chest pain place 1 tablet under the tongue every 5 minutes for 3 doses. If symptoms persist 5 minutes after 1st dose call 911. 30 tablet 0    Allergies   Allergen Reactions    Amoxicillin Unknown     Unsure, long time ago      Niacin Unknown    Shellfish Containing Products [Shellfish-Derived Products] Unknown    Lisinopril Cough         Lab Results    Chemistry/lipid CBC Cardiac Enzymes/BNP/TSH/INR   Lab Results   Component Value Date    CHOL 110 10/03/2023    HDL 47 10/03/2023    TRIG 66 10/03/2023    BUN 35.8 (H) 04/28/2025     04/28/2025    CO2 31 (H) 04/28/2025    Lab Results   Component Value Date    WBC 8.4 01/12/2023    HGB 12.2 (L) 01/12/2023    HCT 36.5 (L) 01/12/2023    MCV 93 01/12/2023     01/12/2023    Lab Results   Component Value Date    TROPONINI <0.01 07/16/2022    TSH 3.23 01/07/2023    INR 1.15 01/06/2023        35 minutes spent on the date of encounter doing chart review, review of test results, interpretation with above tests, patient visit, and documentation.        This note has been dictated using voice recognition software. Any grammatical, typographical, or context distortions are unintentional and inherent to the software

## 2025-06-12 ENCOUNTER — TELEPHONE (OUTPATIENT)
Dept: CARDIOLOGY | Facility: CLINIC | Age: 75
End: 2025-06-12

## 2025-06-12 ENCOUNTER — OFFICE VISIT (OUTPATIENT)
Dept: CARDIOLOGY | Facility: CLINIC | Age: 75
End: 2025-06-12
Payer: COMMERCIAL

## 2025-06-12 VITALS
DIASTOLIC BLOOD PRESSURE: 70 MMHG | WEIGHT: 184 LBS | BODY MASS INDEX: 28.82 KG/M2 | RESPIRATION RATE: 16 BRPM | OXYGEN SATURATION: 96 % | HEART RATE: 60 BPM | SYSTOLIC BLOOD PRESSURE: 110 MMHG

## 2025-06-12 DIAGNOSIS — I25.10 CORONARY ARTERY DISEASE INVOLVING NATIVE CORONARY ARTERY OF NATIVE HEART WITHOUT ANGINA PECTORIS: ICD-10-CM

## 2025-06-12 DIAGNOSIS — I50.32 HEART FAILURE WITH IMPROVED EJECTION FRACTION (HFIMPEF) (H): Primary | ICD-10-CM

## 2025-06-12 DIAGNOSIS — I38 VALVULAR HEART DISEASE: ICD-10-CM

## 2025-06-12 DIAGNOSIS — I10 ESSENTIAL HYPERTENSION: ICD-10-CM

## 2025-06-12 DIAGNOSIS — I25.5 ISCHEMIC CARDIOMYOPATHY: ICD-10-CM

## 2025-06-12 PROBLEM — I35.1 MILD AORTIC VALVE REGURGITATION: Status: RESOLVED | Noted: 2021-03-18 | Resolved: 2025-06-12

## 2025-06-12 PROBLEM — I77.810 MILD ASCENDING AORTA DILATION: Status: RESOLVED | Noted: 2021-03-18 | Resolved: 2025-06-12

## 2025-06-12 RX ORDER — SPIRONOLACTONE 25 MG/1
12.5 TABLET ORAL DAILY
Qty: 15 TABLET | Refills: 1 | Status: SHIPPED | OUTPATIENT
Start: 2025-06-12

## 2025-06-12 RX ORDER — POTASSIUM CHLORIDE 750 MG/1
10 CAPSULE, EXTENDED RELEASE ORAL DAILY
COMMUNITY
Start: 2025-04-29

## 2025-06-12 RX ORDER — FUROSEMIDE 20 MG/1
TABLET ORAL
Qty: 130 TABLET | Refills: 1 | Status: SHIPPED | OUTPATIENT
Start: 2025-06-12

## 2025-06-12 NOTE — TELEPHONE ENCOUNTER
Msg sent to Lauri Rico, with recs from Dr. Manning below. Will monitor patient peripherally for worsening MR or AI on next echo.     Josselin Diane RN on 6/12/2025 at 2:41 PM

## 2025-06-12 NOTE — LETTER
6/12/2025    Wen Mederos MD  Formerly [facility] [place] [address]  [facility] [address]    RE: Jordan Fisher       Dear Colleague,     I had the pleasure of seeing Jordan Fisher in the [facility].          Assessment/Recommendations   Assessment:      #  Ischemic cardiomyopathy, Heart Failure with Improved/Preserved Ejection Fraction with LVEF of 55-60% per ECHO in 5/2025 -NYHA class II:     Current home weight 179 lbs.    Patient is well compensated on exam except continue to have some FELIPE, increase fatigue, decreased activity tolerance in the past 1 year.    Reports mild lightheaded and dizziness.    On low salt diet < 2g/day.  Tries to drink adequate fluid.    Current heart failure regimen includes   - beta-blocker therapy with metoprolol succinate 25 mg daily at bedtime   - ARB therapy with losartan 100 mg daily in a.m.   - Diuretic therapy with furosemide 20 mg daily   - Hydrochlorothiazide 25 mg daily    # Coronary artery disease with status post STEMI complicated by ventricular fibrillation with an cardiac arrest requiring requiring defibrillation with ROSC/dyslipidemia with LDL goal less than 70: Status post PCI/SARITHA to proximal to mid LAD on 1/6/2023.  Repeat coronary angiogram on 1/10/2023 showed patent stents.      MPI Nuclear stress test negative for inducible myocardial ischemic.  Stress test also showed findings consistent with prior inferior apical myocardial infarction.  He was unable to get appointment with Dr. Saenz and therefore saw cardiology at [facility].  Note reviewed.      Most recent LDL is at goal    # Hypertension: BP today is 110/70    # Valvular Heart Disease: mild to moderate aortic stenosis and mild to moderate mitral regurgitation.   With his ongoing symptoms, patient preferred to be evaluated by valve clinic.  Echo reviewed by Dr. Manning and recommended to repeat ECHO in 3-6 months.    Plan:  - Reduced hydrochlorothiazide from 25 mg to 12.5  mg daily  - Reduce Potassium Chloride from 10 mEq from twice a day to once a day  - Start spironolactone 12.5 mg daily  - Please call if you develop lightheaded, dizziness, or any new side effects  - Please get your lab work (BMP-kidney function test) in 1 week after medication change    Follow-up with me in 4 weeks.  Follow up with Dr. Saenz in 2-3 months     The longitudinal plan of care for heart failure with improved ejection fraction, ischemic cardiomyopathy, coronary disease, and hypertension was addressed during this visit.?Due to the added   complexity in care, I will continue to support Jordan Fisher in the subsequent management of this   condition(s) and with the ongoing continuity of care of this condition(s)       History of Present Illness/Subjective    Mr. Jordan Fisher is a 74 year old male with a past medical history of Ambrose's esophagus, carotid artery disease, hypertension, hyperlipidemia, overweight, and recent hospitalization with STEMI involving anterior wall, V. fib and cardiac arrest requiring defibrillation and ROSC, status post PCI to mid LAD,, dyslipidemia, ischemic cardiomyopathy, and heart failure with improved ejection fraction who is seen at [facility] for for continued heart failure follow-up.    Today, Jordan reports that he continues to have some FELIPE, increase fatigue, decreased activity tolerance in the past 1 year. He also reports mild lightheaded and dizziness.He denies shortness of breath, orthopnea, PND, palpitations, chest pain, abdominal fullness/bloating, and lower extremity edema.      ECHO from 6/5/25-reviewed:  Interpretation Summary  The left ventricle is normal in size.  The visual ejection fraction is 55-60%.  Hypokinesis of the apical portion of the inferior wall  The right ventricle is not well visualized.  TAPSE is normal, which is consistent with normal right ventricular systolic  function.  The left atrium is moderate to  severely dilated.  There is mild to moderate (1-2+) mitral regurgitation.  The right ventricular systolic pressure is approximated at 27mmHg plus the  right atrial pressure.  A bicuspid aortic valve cannot be excluded.  There is mild to moderate (1-2+) aortic regurgitation.  Mild valvular aortic stenosis.  Ascending Aorta dilatation is present.  Ascending aorta measures 4.1 cm.  Sinus rhythm was noted.  Prior echo from 5/1/23 shows trileaflet AV.  MR and AI are slightly more prominent on current echo. WMA abnormality similar  on prior echo.    NM Mpi with lexiscan from 9/2024- external report  Order: [id number]  Narrative  Summary    1. Exercise stress myocardial perfusion imaging performed.    2. The patient exercised for 8 minute(s) on the Saeed protocol and achieved 88% of the maximally predicted heart rate, corresponding to above-normal functional capacity for age.    3. The patient experienced dyspnea during the stress test.    4. Resting ECG with baseline ST-T abnormalities. Stress ECG negative for myocardial ischemia relative to baseline.    5. Normal left ventricular chamber size with subtle hypokinesis of the apical inferior segment and normal calculated ejection fraction (62%).    6. Myocardial perfusion imaging is abnormal with a small, moderate-intensity apical inferior perfusion defect on stress supine and prone imaging that improves mildly at rest. Findings are consistent with prior inferior apical myocardial infarction  and a small area of stress-induced paul-infarct ischemia (in either a distal wrap-around left anterior descending coronary artery distribution or posterior descending artery territory). Additional diaphragmatic attenuation is noted, resolving on  prone imaging.    Prior Study Comparison    No prior study for comparison.    ECHO from 5/2023-reviewed:  Interpretation Summary   1.Left ventricular size, wall motion and function are normal. The ejection  fraction is 60-65%.  2.There is mild  septal hypokinesis.  3.Normal right ventricle size and systolic function.  4.Thickened aortic valve leaflets There is mild (1+) aortic  regurgitation.Decel 1/2 time is 525 msec.  5.The ascending aorta is Borderline dilated.  Compared to the prior study dated 1/8/2023, the LV has normalized and the WMA  has resolved.    Coronary Angiogram on 1/10/23: reviewed:  Conclusion  Widely patent stent in prox-to-mid LAD.  Mild non obstructive disease in RCA and LCx (unchanged from prior angiogram on 1/6/2023).  No new obstructive lesions noted.    Chest wall pain secondary to CPR during recent cardiac arrest.      Plan    Follow bedrest per protocol    Continued medical management and lifestyle modifications for cardiovascular risk factor optimizations.    Follow up visit with Nurse Practitioner in 1-2 weeks.    Arterial sheath removed from radial artery with TR band placement.    Cardiac rehabilitation.    Return to the primary inpatient team for further evaluation and managmenet    1. DAPT x at least 12 months  2. High intensity statin     Cor Angio on 1/6/23Conclusion  Anterior STEMI.  Single vessel severe CAD.  99% thrombotic lesion in prox-to-mid LAD s/p successful PCI with SARITHA x 1 (Synergy 3.0 x 38 mm SARITHA, post dilated with 3.5 mm balloon); mild disease in RCA and LCx.  Successful closure of right CFA access with 6F Angioseal.        Plan    Follow bedrest per protocol    Continued medical management and lifestyle modifications for cardiovascular risk factor optimizations.    Arterial sheath removed from femoral artery with closure device.    Femoral angiogram identifies arterial sheath placement suitable for closure device.    Admit to inpatient    1. DAPT with ASA/Ticagrelor x 12 months, ASA monotherapy indefinitely thereafter.  2. High intensity statin  3. Echocardiogram  4. Beta blocker once off pressors and hemodynamically stable  5. Aggressive risk factor modification and medical management of coronary artery  disease.  6. Bed rest x 3 hours with femoral access site monitoring as per protocol.     ECHO 1/8/23-Reviewed:   Interpretation Summary  Limited TTE to evaluate for LV function/wall motion.     Left ventricular function is decreased. The ejection fraction is 40-45%  (mildly reduced). There is a pattern of wall motion abnormalities that is  consistent with a LAD culprit infarction.  Global right ventricular function is normal. This study was compared with the  study from 01/07/2023. No significant changes noted.     Physical Examination Review of Systems   /70 (BP Location: Right arm, Patient Position: Sitting, Cuff Size: Adult Regular)   Pulse 60   Resp 16   Wt 83.5 kg (184 lb)   SpO2 96%   BMI 28.82 kg/m    Body mass index is 28.82 kg/m .  Wt Readings from Last 3 Encounters:   06/12/25 83.5 kg (184 lb)   04/21/25 86.9 kg (191 lb 9.6 oz)   10/22/24 81.2 kg (179 lb)     General Appearance:   no distress, normal body habitus   ENT/Mouth: membranes moist, no oral lesions or bleeding gums.      EYES:  no scleral icterus, normal conjunctivae   Neck: no carotid bruits or thyromegaly   Chest/Lungs:   lungs are clear to auscultation, no rales or wheezing, equal chest wall expansion    Cardiovascular:    Heart rate regular; Normal first and second heart sounds with no murmurs, rubs, or gallops;  Jugular venous pressure flat, no edema bilaterally    Abdomen:  no organomegaly, masses, bruits, or tenderness; bowel sounds are present   Extremities   no cyanosis or clubbing;  CMS intact.   Skin: no xanthelasma, warm.    Neurologic: no tremors   Psychiatric: alert and oriented x3, calm                                                    Negative unless noted in HPI     Medical History  Surgical History Family History Social History   No past medical history on file. Past Surgical History:   Procedure Laterality Date     CHOLECYSTECTOMY       CV CORONARY ANGIOGRAM N/A 1/6/2023    Procedure: Coronary Angiogram;  Surgeon:  Brandon Walker MD;  Location: [facility] CARDIAC CATH LAB     CV CORONARY ANGIOGRAM N/A 1/10/2023    Procedure: Coronary Angiogram;  Surgeon: Brandon Walker MD;  Location: [facility] CARDIAC CATH LAB     CV INTRAVASULAR ULTRASOUND N/A 1/6/2023    Procedure: Intravascular Ultrasound;  Surgeon: Brandon Walker MD;  Location: [facility] CARDIAC CATH LAB     CV PCI STENT DRUG ELUTING N/A 1/6/2023    Procedure: Percutaneous Coronary Intervention Stent;  Surgeon: Brandon Walker MD;  Location: [facility] CARDIAC CATH LAB    No family history on file. Social History     Socioeconomic History     Marital status:      Spouse name: Not on file     Number of children: Not on file     Years of education: Not on file     Highest education level: Not on file   Occupational History     Not on file   Tobacco Use     Smoking status: Never     Smokeless tobacco: Never   Substance and Sexual Activity     Alcohol use: Yes     Drug use: No     Sexual activity: Not on file   Other Topics Concern     Not on file   Social History Narrative     Not on file     Social Drivers of Health     Financial Resource Strain: Low Risk  (8/13/2021)    Received from [facility]    Overall Financial Resource Strain (CARDIA)      Difficulty of Paying Living Expenses: Not very hard   Food Insecurity: No Food Insecurity (8/13/2021)    Received from [facility]    Hunger Vital Sign      Worried About Running Out of Food in the Last Year: Never true      Ran Out of Food in the Last Year: Never true   Transportation Needs: No Transportation Needs (8/13/2021)    Received from [facility]    PRAPARE - Transportation      Lack of Transportation (Medical): No      Lack of Transportation (Non-Medical): No   Physical Activity: Sufficiently Active (8/13/2021)    Received from [facility]    Exercise Vital Sign      Days of Exercise per Week: 5 days      Minutes of Exercise per Session: 70 min   Stress:  Stress Concern Present (8/13/2021)    Received from [facility]    [place] [employer] Occupational [employer] - Occupational Stress Questionnaire      Feeling of Stress : To some extent   Social Connections: Moderately Integrated (8/13/2021)    Received from [facility]    Social Connection and Isolation Panel [NHANES]      Frequency of Communication with Friends and Family: More than three times a week      Frequency of Social Gatherings with Friends and Family: Twice a week      Attends Druze Services: 1 to 4 times per year      Active Member of Clubs or Organizations: No      Attends Club or Organization Meetings: Never      Marital Status:    Interpersonal Safety: Not At Risk (8/13/2021)    Received from [facility]    Humiliation, Afraid, Rape, and Kick questionnaire      Fear of Current or Ex-Partner: No      Emotionally Abused: No      Physically Abused: No      Sexually Abused: No   Housing Stability: Low Risk  (8/13/2021)    Received from [facility]    Housing Stability Vital Sign      Unable to Pay for Housing in the Last Year: No      In the last 12 months, how many places have you lived?: 1      In the last 12 months, was there a time when you did not have a steady place to sleep or slept in a shelter (including now)?: No          Medications  Allergies   Current Outpatient Medications   Medication Sig Dispense Refill     aspirin 81 mg chewable tablet Take 81 mg by mouth daily       atorvastatin (LIPITOR) 80 MG tablet Take 80 mg by mouth daily.       famotidine (PEPCID) 20 MG tablet Take 20 mg by mouth every evening.       furosemide (LASIX) 20 MG tablet Take 1 tablet daily. May take extra 1 tablet as needed for weight gain >3lbs with shortness of breath, bloating, and leg swelling 130 tablet 1     hydrochlorothiazide (HYDRODIURIL) 25 MG tablet Take 12.5 mg by mouth daily.       losartan (COZAAR) 100 MG tablet Take 1 tablet by mouth daily       metoprolol succinate ER (TOPROL XL) 25 MG 24 hr  tablet Take 1 tablet (25 mg) by mouth At Bedtime 90 tablet 3     Multiple Vitamins-Minerals (PRESERVISION AREDS PO) Take 1 capsule by mouth 2 times daily.       Omeprazole 20 MG TBDD Take 20 mg by mouth every morning.       potassium chloride ER (MICRO-K) 10 MEQ CR capsule Take 10 mEq by mouth daily.       sildenafil (VIAGRA) 25 MG tablet Take 1 tablet (25 mg) by mouth daily as needed 10 tablet 0     spironolactone (ALDACTONE) 25 MG tablet Take 0.5 tablets (12.5 mg) by mouth daily. 15 tablet 1     sucralfate (CARAFATE) 1 GM tablet Take 1 g by mouth 4 times daily as needed (Heartburn).       tamsulosin (FLOMAX) 0.4 MG capsule Take 0.4 mg by mouth daily       nitroGLYcerin (NITROSTAT) 0.4 MG sublingual tablet For chest pain place 1 tablet under the tongue every 5 minutes for 3 doses. If symptoms persist 5 minutes after 1st dose call 911. 30 tablet 0    Allergies   Allergen Reactions     Amoxicillin Unknown     Unsure, long time ago       Niacin Unknown     Shellfish Containing Products [Shellfish-Derived Products] Unknown     Lisinopril Cough         Lab Results    Chemistry/lipid CBC Cardiac Enzymes/BNP/TSH/INR   Lab Results   Component Value Date    CHOL 110 10/03/2023    HDL 47 10/03/2023    TRIG 66 10/03/2023    BUN 35.8 (H) 04/28/2025     04/28/2025    CO2 31 (H) 04/28/2025    Lab Results   Component Value Date    WBC 8.4 01/12/2023    HGB 12.2 (L) 01/12/2023    HCT 36.5 (L) 01/12/2023    MCV 93 01/12/2023     01/12/2023    Lab Results   Component Value Date    TROPONINI <0.01 07/16/2022    TSH 3.23 01/07/2023    INR 1.15 01/06/2023        35 minutes spent on the date of encounter doing chart review, review of test results, interpretation with above tests, patient visit, and documentation.        This note has been dictated using voice recognition software. Any grammatical, typographical, or context distortions are unintentional and inherent to the software        Thank you for allowing me to  participate in the care of your patient.      Sincerely,     VERONA Salgado CNP     [facility]  cc:   VERONA Salgado CNP  [address]  [place] [address]

## 2025-06-12 NOTE — TELEPHONE ENCOUNTER
"  ===View-only below this line===  ----- Message -----  From: Melva Hurtado MA  Sent: 6/12/2025  10:21 AM CDT  To: [facility]  Subject: FYI: ORDER FOR STRUCTURAL/VALVE                  Hello-    Just and FYI: Lauri's pt has a referral with structural/valve. \"Follow up with valve clinic for valvular heart disease per pt's request\" per Lauri's order.    Thanks  Melva"

## 2025-06-12 NOTE — TELEPHONE ENCOUNTER
===View-only below this line===  ----- Message -----  From: Lauri Rico APRN CNP  Sent: 6/12/2025   2:14 PM CDT  To: MEMO Brown,  Thank you for reaching out!  His recent echo showed mild to moderate mitral regurgitation and mild to moderate aortic regurgitation.  This is slightly worse compared to the last echo from a year ago.  He reports increased fatigue, dyspnea exertion, and activity intolerance in the past year.  He asked me about the intervention for progressive valve disease and requested to be seen in valve clinic.  If Dr. Manning can take a look at his ECHO result and see if we can hold off the consult and have repeat in 3-6 months, we can certainly do that. Please let me know what you guys decide.    CY  ----- Message -----  From: Josselin Diane RN  Sent: 6/12/2025   1:14 PM CDT  To: VERONA Salgado CNP,  I got a message from an MA that you wanted to refer this patient to valve clinic. I looked at your note but saw it wasn't completed yet. Could you tell me what the consult is for?     Thanks,  AV

## 2025-06-12 NOTE — TELEPHONE ENCOUNTER
===View-only below this line===  ----- Message -----  From: Steven Manning MD  Sent: 6/12/2025   2:33 PM CDT  To: Josselin Diane RN; Marlon Rivera MD  Subject: RE: plz review echo                              I agree - not entirely clear what the culprit valvular lesion is. AI seems 1+, MR 1-2+.  Would repeat a TTE in 3-6 mos, if any worsening, can see him in [facility] then.    Thx!  Steven  ----- Message -----

## 2025-06-12 NOTE — PATIENT INSTRUCTIONS
Jordan Fisher,    It was a pleasure to see you today at the [facility].     My recommendations after this visit include:    - Reduced hydrochlorothiazide from 25 mg to 12.5 mg daily    - Reduce Potassium Chloride from 10 mEq from twice a day to once a day    - Start spironolactone 12.5 mg daily    - Please call if you develop lightheaded, dizziness, or any new side effects    - Please get your lab work (BMP-kidney function test) in 1 week after medication change    - Low sodium diet < 2000 mg/day, daily weight monitoring, and maintain adequate fluid intake at 50 to 60 ounces per day    -Please call if you experience persistent weight gain 2 to 3 pounds 2 days in a row or 5 pounds in a week with shortness of breath, abdominal bloating and leg swelling    - Follow up with Dr. Saenz in 2-3 months     - Follow up with valve clinic -referral placed.    - Please call Heart Failure Nurse Line at [phone], if you have any questions or concerns

## 2025-07-07 NOTE — PROGRESS NOTES
Medication Therapy Management (MTM) Encounter    ASSESSMENT:                            Medication Adherence/Access: No issues identified.      Heart Failure /HTN  Stable. Recent medication changes made by cardiology on 6/12/25. Reviewed changes with patient and educated patient about need for repeat labs 1 week after changes are made. Education patient on drug interaction between furosemide and sucralfate.      Hyperlipidemia /CAD  Stable       GERD    Stable.     Supplements   Stable    ED  Stable. Reiterated drug interaction between Viagra and nitroglycerin.       PLAN:                            Rcheck labs 1 week after making mediation changes as directed by cardiology at recent visit on 6/12/25.    Follow-up: 1/6/26 at 9 am via phone   Cardiology follow-up on 8/15/25    SUBJECTIVE/OBJECTIVE:                          Jordan Fisher is a 74 year old male seen for an initial visit. He was referred to me from Lauri Rico APRN CNP.      Reason for visit: Pt is concern about cardiac medication interaction, how to take.    Allergies/ADRs: Reviewed in chart  Past Medical History: Reviewed in chart  Tobacco: He reports that he has never smoked. He has never used smokeless tobacco.  Alcohol: 1-3 beverages / week    Medication Adherence/Access: None. [facility]    Heart Failure /HTN  Beta Blocker: Metoprolol ER 25 mg daily   ACEi/ARB/ARNI: Losartan 100 mg daily   MRA: Spironolactone 12.5 mg daily   Diuretic(s): Furosemide 20 mg daily. May take an extra tablet as needed weight gain > 3lbs with SOB, bloating, leg swelling  Hydrochlorothiazide 12.5 mg daily     Patient has not started taking spironolactone yet, he is [facility].   Patient report some dizziness/lightheaded when he get up fast, but only last for a second and resolves quickly.  Has some swelling in ankles and feet, but patient reports this is why cardiology made some recent medication  changes.  Patient self-monitors blood pressure.  Home BP monitoring 112/60 today. Earlier today was 124/60.     Patient aware of needing repeat labs about 1 week after medication changes.     BP Readings from Last 3 Encounters:   06/12/25 110/70   04/21/25 132/82   10/29/24 125/70          Hyperlipidemia /CAD  Atorvastatin 80 mg daily   Aspirin 81 mg daily  Nitroglycerin 0.4 mg SL as needed - never used before   Patient reports no significant myalgias or other side effects.       GERD    Omeprazole 20 mg daily as needed   Famotidine 20 mg daily as needed  Sucralfate 1 g qid as needed for heartburn - normally take about 1 per day   No concerns         Supplements   Multivitamin daily twice a day   Potassium chloride ER 10 mEq daily   Patients list of medications said to take potassium chloride 10 meq once a day, but patient's prescription bottle said twice a day. Per chart review, potassium was reduce to once a day at recent cardiology visit on 6/12/25.       ED:  Viagra 25 mg daily as needed   Very irregularly. Patient said he is well aware of the drug interaction between Viagra and the nitroglycerin SL tablets.          ----------------  I spent 25 minutes with this patient today. I offer these suggestions for consideration by Lauri Rico APRN CNP.     A summary of these recommendations was sent via [address]    Elizabeth Nash, PharmD  Medication Therapy Management Pharmacist   [facility]      Telemedicine Visit Details  The patient's medications can be safely assessed via a telemedicine encounter.  Type of service:  Video Conference via BeMo  Originating Location (pt. Location): Home    Distant Location (provider location):  Off-site  Start Time: 8:30 AM  End Time: 8:55 AM     Medication Therapy Recommendations  No medication therapy recommendations to display

## 2025-07-08 ENCOUNTER — VIRTUAL VISIT (OUTPATIENT)
Facility: CLINIC | Age: 75
End: 2025-07-08
Attending: NURSE PRACTITIONER
Payer: COMMERCIAL

## 2025-07-08 DIAGNOSIS — I10 ESSENTIAL HYPERTENSION: ICD-10-CM

## 2025-07-08 DIAGNOSIS — E78.5 DYSLIPIDEMIA, GOAL LDL BELOW 70: ICD-10-CM

## 2025-07-08 DIAGNOSIS — I50.32 HEART FAILURE WITH IMPROVED EJECTION FRACTION (HFIMPEF) (H): Primary | ICD-10-CM

## 2025-07-08 NOTE — PATIENT INSTRUCTIONS
"Recommendations from today's MTM visit:                                                      Rcheck labs 1 week after making mediation changes as directed by cardiology at recent visit on 6/12/25.    Follow-up: 1/6/26 at 9 am via phone   Cardiology follow-up on 8/15/25    It was great speaking with you today.  I value your experience and would be very thankful for your time in providing feedback in our clinic survey. In the next few days, you may receive an email or text message from [facility] with a link to a survey related to your  clinical pharmacist.\"     To schedule another MTM appointment, please call the clinic directly or you may call the MTM scheduling line at [phone].    My Clinical Pharmacist's contact information:                                                      Please feel free to contact me with any questions or concerns you have.      Elizabeth Nash, PharmD  Medication Therapy Management Pharmacist   [facility]     "

## 2025-07-08 NOTE — Clinical Note
Jackson Carreon,   Spoke to Jordan following your referral. He has not started spironolactone yet, as he is still waiting on this medication from the [facility]. He forgot he was supposed to reduce potassium chloride to once a day (per your visit 6/12/25), reminded him of medication changes that were recommended. He is aware he needs labs rechecked 1 week after medication changes.   Elizabeth Anand, PharmD Medication Therapy Management Pharmacist  [facility]

## 2025-08-12 ENCOUNTER — LAB (OUTPATIENT)
Dept: LAB | Facility: HOSPITAL | Age: 75
End: 2025-08-12
Payer: COMMERCIAL

## 2025-08-12 DIAGNOSIS — I50.32 HEART FAILURE WITH IMPROVED EJECTION FRACTION (HFIMPEF) (H): ICD-10-CM

## 2025-08-12 LAB
ANION GAP SERPL CALCULATED.3IONS-SCNC: 13 MMOL/L (ref 7–15)
BUN SERPL-MCNC: 22.6 MG/DL (ref 8–23)
CALCIUM SERPL-MCNC: 9.1 MG/DL (ref 8.8–10.4)
CHLORIDE SERPL-SCNC: 104 MMOL/L (ref 98–107)
CREAT SERPL-MCNC: 1.02 MG/DL (ref 0.67–1.17)
EGFRCR SERPLBLD CKD-EPI 2021: 76 ML/MIN/1.73M2
GLUCOSE SERPL-MCNC: 107 MG/DL (ref 70–99)
HCO3 SERPL-SCNC: 22 MMOL/L (ref 22–29)
POTASSIUM SERPL-SCNC: 3.9 MMOL/L (ref 3.4–5.3)
SODIUM SERPL-SCNC: 139 MMOL/L (ref 135–145)

## 2025-08-12 PROCEDURE — 80048 BASIC METABOLIC PNL TOTAL CA: CPT

## 2025-08-12 PROCEDURE — 36415 COLL VENOUS BLD VENIPUNCTURE: CPT

## 2025-08-26 ENCOUNTER — LAB (OUTPATIENT)
Dept: LAB | Facility: HOSPITAL | Age: 75
End: 2025-08-26
Payer: COMMERCIAL

## (undated) DEVICE — SLEEVE TR BAND RADIAL COMPRESSION DEVICE 24CM TRB24-REG

## (undated) DEVICE — CATH BALLOON NC EMERGE 3.50X12MM H7493926712350

## (undated) DEVICE — GUIDEWIRE VASC 0.014INX180CM RUNTHROUGH 25-1011

## (undated) DEVICE — CATH BALLOON EMERGE 2.5X12MM H7493918912250

## (undated) DEVICE — INTRO GLIDESHEATH SLENDER 6FR 10X45CM 60-1060

## (undated) DEVICE — GLIDEWIRE TERUMO .035X180CM 1.5,, J-TIP GR3525

## (undated) DEVICE — Device

## (undated) DEVICE — FASTENER CATH BALLOON CLAMPX2 STATLOCK 0684-00-493

## (undated) DEVICE — KIT HAND CONTROL ACIST 016795

## (undated) DEVICE — PACK HEART LEFT CUSTOM

## (undated) DEVICE — MANIFOLD KIT ANGIO AUTOMATED 014613

## (undated) DEVICE — INTRO SHEATH 6FRX25CM PINNACLE RSS606

## (undated) DEVICE — CATH GUIDING BLUE YELLOW PTFE XB4 6FRX100CM 67005600

## (undated) DEVICE — TUBING PRESSURE 30"

## (undated) DEVICE — CATH BALLOON NC EMERGE 3.00X20MM H7493926720300

## (undated) DEVICE — CATH US OD 5FR OD SEC 2.9FR EAGLE EYE PLATINUM 0.014 85900P

## (undated) DEVICE — VALVE HMSTS PHD SM BORE W/ SPR MTL INS TOOL TRQ DVC MAP802

## (undated) DEVICE — CATH ANGIO INFINITI 3DRC 6FRX100CM 534676T

## (undated) DEVICE — CATH GUIDING BLUE YELLOW PTFE XB3.5 6FRX100CM 67005400

## (undated) DEVICE — KIT DVC ANGIO IBASIXCOMPAK 13INX20ML 3WAY IN4430

## (undated) RX ORDER — DIPHENHYDRAMINE HYDROCHLORIDE 50 MG/ML
INJECTION INTRAMUSCULAR; INTRAVENOUS
Status: DISPENSED
Start: 2023-01-10

## (undated) RX ORDER — NITROGLYCERIN 5 MG/ML
VIAL (ML) INTRAVENOUS
Status: DISPENSED
Start: 2023-01-10

## (undated) RX ORDER — NOREPINEPHRINE BITARTRATE 0.06 MG/ML
INJECTION, SOLUTION INTRAVENOUS
Status: DISPENSED
Start: 2023-01-06

## (undated) RX ORDER — FENTANYL CITRATE 50 UG/ML
INJECTION, SOLUTION INTRAMUSCULAR; INTRAVENOUS
Status: DISPENSED
Start: 2023-01-10

## (undated) RX ORDER — HEPARIN SODIUM 1000 [USP'U]/ML
INJECTION, SOLUTION INTRAVENOUS; SUBCUTANEOUS
Status: DISPENSED
Start: 2023-01-10

## (undated) RX ORDER — NICARDIPINE HCL-0.9% SOD CHLOR 1 MG/10 ML
SYRINGE (ML) INTRAVENOUS
Status: DISPENSED
Start: 2023-01-10